# Patient Record
Sex: MALE | Race: WHITE | NOT HISPANIC OR LATINO | URBAN - METROPOLITAN AREA
[De-identification: names, ages, dates, MRNs, and addresses within clinical notes are randomized per-mention and may not be internally consistent; named-entity substitution may affect disease eponyms.]

---

## 2020-03-08 ENCOUNTER — EMERGENCY (EMERGENCY)
Facility: HOSPITAL | Age: 60
LOS: 1 days | Discharge: HOME | End: 2020-03-08
Attending: STUDENT IN AN ORGANIZED HEALTH CARE EDUCATION/TRAINING PROGRAM | Admitting: STUDENT IN AN ORGANIZED HEALTH CARE EDUCATION/TRAINING PROGRAM
Payer: COMMERCIAL

## 2020-03-08 VITALS
HEART RATE: 101 BPM | OXYGEN SATURATION: 95 % | RESPIRATION RATE: 16 BRPM | SYSTOLIC BLOOD PRESSURE: 117 MMHG | DIASTOLIC BLOOD PRESSURE: 72 MMHG | TEMPERATURE: 98 F

## 2020-03-08 PROCEDURE — 12011 RPR F/E/E/N/L/M 2.5 CM/<: CPT

## 2020-03-08 PROCEDURE — 99284 EMERGENCY DEPT VISIT MOD MDM: CPT | Mod: 25

## 2020-03-08 RX ORDER — TETANUS TOXOID, REDUCED DIPHTHERIA TOXOID AND ACELLULAR PERTUSSIS VACCINE, ADSORBED 5; 2.5; 8; 8; 2.5 [IU]/.5ML; [IU]/.5ML; UG/.5ML; UG/.5ML; UG/.5ML
0.5 SUSPENSION INTRAMUSCULAR ONCE
Refills: 0 | Status: COMPLETED | OUTPATIENT
Start: 2020-03-08 | End: 2020-03-08

## 2020-03-08 RX ADMIN — TETANUS TOXOID, REDUCED DIPHTHERIA TOXOID AND ACELLULAR PERTUSSIS VACCINE, ADSORBED 0.5 MILLILITER(S): 5; 2.5; 8; 8; 2.5 SUSPENSION INTRAMUSCULAR at 22:19

## 2020-03-08 NOTE — ED PROVIDER NOTE - NSFOLLOWUPCLINICS_GEN_ALL_ED_FT
Oral & Maxillofacial Surgery  Department of Dental Medicine  [address] [place] [address]  Phone: [phone]  Fax: [phone]  Follow Up Time:

## 2020-03-08 NOTE — ED PROVIDER NOTE - NS ED ROS FT
GENERAL: No fever or chills, EYES: no change in vision, HEENT: no trouble swallowing or speaking, CARDIAC: no chest pain, PULMONARY: no cough or SOB, GI: no abdominal pain, no nausea, no vomiting, no diarrhea or constipation, : No changes in urination, SKIN: no rashes, NEURO: no headache,  MSK: No joint pain ~Arash Lazaro M.D. Resident

## 2020-03-08 NOTE — ED ADULT NURSE NOTE - OBJECTIVE STATEMENT
Pt presents to room 18, aox2-3 with c/o intox. Pt states his last drink was 7-8pm today and "drank a lot of vodka, a whole bottle". Pt states he tripped and fell on face. Upon arrival, pt appears with small laceration on nose, was covered with band-aid,  No active bleeding at this time. Pt is calm and cooperative, no tremors noted, follows verbal commands. Pt denies any headache, dizziness, or N/V/D. Pt denies SI/HI at this time. Pt's belongings taken to room 21, valuables taken to security. Will continue to monitor. Pt presents to room 18, aox2-3 with c/o intox. Pt states his last drink was 7-8pm today and "drank a lot of vodka, a whole bottle". Pt states he tripped and fell on face. Pt states he drinks "every weekend" Upon arrival, pt appears with small laceration on nose, was covered with band-aid,  No active bleeding at this time. Pt is calm and cooperative, no tremors noted, follows verbal commands. Pt denies any headache, dizziness, or N/V/D. Pt denies SI/HI at this time. Pt's belongings taken to room 21, valuables taken to security. Will continue to monitor.

## 2020-03-08 NOTE — ED ADULT NURSE NOTE - NSIMPLEMENTINTERV_GEN_ALL_ED
Implemented All Fall Risk Interventions:  [place] to call system. Call bell, personal items and telephone within reach. Instruct patient to call for assistance. Room bathroom lighting operational. Non-slip footwear when patient is off stretcher. Physically safe environment: no spills, clutter or unnecessary equipment. Stretcher in lowest position, wheels locked, appropriate side rails in place. Provide visual cue, wrist band, yellow gown, etc. Monitor gait and stability. Monitor for mental status changes and reorient to person, place, and time. Review medications for side effects contributing to fall risk. Reinforce activity limits and safety measures with patient and family.

## 2020-03-08 NOTE — ED PROVIDER NOTE - CLINICAL SUMMARY MEDICAL DECISION MAKING FREE TEXT BOX
59yM with no significant pmh presents with facial laceration s/p intox and fall. Will evaluate with CT head/Cspine, Ct maxface, tetanus shot and reassess

## 2020-03-08 NOTE — ED ADULT TRIAGE NOTE - CHIEF COMPLAINT QUOTE
Pt. brought to [facility] ED by WALI for laceration on nose. Pt. tripped and fell in bathroom. Pt. drank a lot of vodka today. Thin vertical laceration noted on bridge of nose. Bleeding controlled. . NAD noted. Denies use of anti-coagulants. Does take asa.

## 2020-03-08 NOTE — ED ADULT NURSE NOTE - CHIEF COMPLAINT QUOTE
Pt. brought to [facility] ED by WALI for laceration on nose. Pt. tripped and fell in bathroom. Pt. drank a lot of vodka today. Thin vertical laceration noted on bridge of nose. Bleeding controlled. . NAD noted. Denies use of anti-coagulants. Does take asa.

## 2020-03-08 NOTE — ED PROVIDER NOTE - NSFOLLOWUPINSTRUCTIONS_ED_ALL_ED_FT
Please follow up with your primary care physician in 24-48 hours  A copy of your results have been provided to you  A referral to Oral and Maxillofacial surgery have been provided to you  Please come back if any of the following: Fever, chest pain, shortness of breath, headache, changes in vision, nausea, pain with eye movement or any major concern Please follow up with your primary care physician in 24-48 hours  Please come back to the Emergency Department or your PCP to remove the stitches in 7 days  A copy of your results have been provided to you  A referral to Oral and Maxillofacial surgery have been provided to you  Please come back if any of the following: Fever, chest pain, shortness of breath, headache, changes in vision, nausea, pain with eye movement or any major concern

## 2020-03-08 NOTE — ED PROVIDER NOTE - PATIENT PORTAL LINK FT
You can access the FollowMyHealth Patient Portal offered by [facility] by registering at the following website: [email or web address]. By joining [facility]’s FollowMyHealth portal, you will also be able to view your health information using other applications (apps) compatible with our system.

## 2020-03-08 NOTE — ED PROVIDER NOTE - OBJECTIVE STATEMENT
59yM with no significant pmh presents with facial laceration s/p intox and fall. States that he drank a bottle of vodka and went to the bathroom in which he tripped and fell face forwards. Admits to drinking a bottle every weekend but have never been in withdrawal. No fever, headache, changes in vision, chest pain, abd pain, n/v, urinary or bowel irregularities

## 2020-03-08 NOTE — ED PROVIDER NOTE - PHYSICAL EXAMINATION
Gen: AAOx3, non-toxic  Head: NCAT  HEENT: EOMI, oral mucosa moist, normal conjunctiva, -1.5cm laceration over nose bridge with mild edema  Lung: CTAB, no respiratory distress, no wheezes/rhonchi/rales B/L, speaking in full sentences  CV: RRR, no murmurs, rubs or gallops  Abd: soft, NTND, no guarding, no CVA tenderness  MSK: no visible deformities, Strength 5/5 UE/LE b/l  Neuro: No focal sensory or motor deficits  Skin: Warm, well perfused, no rash  Psych: normal affect.   ~Arash Lazaro M.D. Resident

## 2020-03-09 VITALS
SYSTOLIC BLOOD PRESSURE: 150 MMHG | RESPIRATION RATE: 16 BRPM | HEART RATE: 109 BPM | OXYGEN SATURATION: 100 % | DIASTOLIC BLOOD PRESSURE: 85 MMHG

## 2020-03-09 PROCEDURE — 70486 CT MAXILLOFACIAL W/O DYE: CPT | Mod: 26

## 2020-03-09 PROCEDURE — 72125 CT NECK SPINE W/O DYE: CPT | Mod: 26

## 2020-03-09 PROCEDURE — 70450 CT HEAD/BRAIN W/O DYE: CPT | Mod: 26

## 2020-03-09 NOTE — ED ADULT NURSE REASSESSMENT NOTE - NS ED NURSE REASSESS COMMENT FT1
Pt is aox4, appears to be comfortable, no tremors or sweated noted. Pt demonstrates steady gait, pt denies any symptoms at this time. Awaiting discharge. Will continue to monitor.

## 2024-11-02 ENCOUNTER — INPATIENT (INPATIENT)
Facility: HOSPITAL | Age: 64
LOS: 0 days | Discharge: LEFT BEFORE DISCHARGE | DRG: 445 | End: 2024-11-02
Attending: INTERNAL MEDICINE | Admitting: INTERNAL MEDICINE
Payer: COMMERCIAL

## 2024-11-02 VITALS
TEMPERATURE: 98 F | DIASTOLIC BLOOD PRESSURE: 76 MMHG | SYSTOLIC BLOOD PRESSURE: 157 MMHG | HEART RATE: 108 BPM | RESPIRATION RATE: 18 BRPM | OXYGEN SATURATION: 98 %

## 2024-11-02 VITALS
OXYGEN SATURATION: 98 % | TEMPERATURE: 99 F | WEIGHT: 220.02 LBS | RESPIRATION RATE: 19 BRPM | SYSTOLIC BLOOD PRESSURE: 152 MMHG | HEART RATE: 115 BPM | DIASTOLIC BLOOD PRESSURE: 85 MMHG

## 2024-11-02 DIAGNOSIS — R17 UNSPECIFIED JAUNDICE: ICD-10-CM

## 2024-11-02 LAB
ALBUMIN SERPL ELPH-MCNC: 2.3 G/DL — LOW (ref 3.5–5)
ALBUMIN SERPL ELPH-MCNC: 2.3 G/DL — LOW (ref 3.5–5)
ALP SERPL-CCNC: 216 U/L — HIGH (ref 40–120)
ALT FLD-CCNC: 40 U/L DA — SIGNIFICANT CHANGE UP (ref 10–60)
AMMONIA BLD-MCNC: 39 UMOL/L — HIGH (ref 11–32)
ANION GAP SERPL CALC-SCNC: 8 MMOL/L — SIGNIFICANT CHANGE UP (ref 5–17)
APPEARANCE UR: CLEAR — SIGNIFICANT CHANGE UP
APTT BLD: 33.6 SEC — SIGNIFICANT CHANGE UP (ref 24.5–35.6)
AST SERPL-CCNC: 175 U/L — HIGH (ref 10–40)
BACTERIA # UR AUTO: ABNORMAL /HPF
BASE EXCESS BLDV CALC-SCNC: 8.8 MMOL/L — SIGNIFICANT CHANGE UP
BASOPHILS # BLD AUTO: 0.03 K/UL — SIGNIFICANT CHANGE UP (ref 0–0.2)
BASOPHILS NFR BLD AUTO: 0.3 % — SIGNIFICANT CHANGE UP (ref 0–2)
BILIRUB DIRECT SERPL-MCNC: 12.4 MG/DL — HIGH (ref 0–0.3)
BILIRUB INDIRECT FLD-MCNC: 3.5 MG/DL — HIGH (ref 0.2–1)
BILIRUB SERPL-MCNC: 15.9 MG/DL — HIGH (ref 0.2–1.2)
BILIRUB SERPL-MCNC: 15.9 MG/DL — HIGH (ref 0.2–1.2)
BILIRUB UR-MCNC: ABNORMAL
BUN SERPL-MCNC: 19 MG/DL — HIGH (ref 7–18)
CA-I SERPL-SCNC: SIGNIFICANT CHANGE UP MMOL/L (ref 1.15–1.33)
CALCIUM SERPL-MCNC: 8.7 MG/DL — SIGNIFICANT CHANGE UP (ref 8.4–10.5)
CHLORIDE SERPL-SCNC: 90 MMOL/L — LOW (ref 96–108)
CO2 SERPL-SCNC: 28 MMOL/L — SIGNIFICANT CHANGE UP (ref 22–31)
COLOR SPEC: SIGNIFICANT CHANGE UP
COMMENT - URINE: SIGNIFICANT CHANGE UP
CREAT SERPL-MCNC: 1.7 MG/DL — HIGH (ref 0.5–1.3)
DIFF PNL FLD: NEGATIVE — SIGNIFICANT CHANGE UP
EGFR: 45 ML/MIN/1.73M2 — LOW
EGFR: 45 ML/MIN/1.73M2 — LOW
EOSINOPHIL # BLD AUTO: 0.05 K/UL — SIGNIFICANT CHANGE UP (ref 0–0.5)
EOSINOPHIL NFR BLD AUTO: 0.5 % — SIGNIFICANT CHANGE UP (ref 0–6)
EPI CELLS # UR: PRESENT
ETHANOL SERPL-MCNC: <3 MG/DL — SIGNIFICANT CHANGE UP (ref 0–10)
GAS PNL BLDV: 117 MMOL/L — CRITICAL LOW (ref 136–145)
GAS PNL BLDV: SIGNIFICANT CHANGE UP
GLUCOSE SERPL-MCNC: 166 MG/DL — HIGH (ref 70–99)
GLUCOSE UR QL: NEGATIVE MG/DL — SIGNIFICANT CHANGE UP
HCO3 BLDV-SCNC: 33 MMOL/L — HIGH (ref 22–29)
HCT VFR BLD CALC: 27.7 % — LOW (ref 39–50)
HGB BLD-MCNC: 10.1 G/DL — LOW (ref 13–17)
IMM GRANULOCYTES NFR BLD AUTO: 0.4 % — SIGNIFICANT CHANGE UP (ref 0–0.9)
INR BLD: 1.68 RATIO — HIGH (ref 0.85–1.16)
KETONES UR-MCNC: NEGATIVE MG/DL — SIGNIFICANT CHANGE UP
LACTATE BLDV-MCNC: 2.1 MMOL/L — HIGH (ref 0.5–2)
LACTATE SERPL-SCNC: 1.7 MMOL/L — SIGNIFICANT CHANGE UP (ref 0.7–2)
LDH SERPL L TO P-CCNC: 240 U/L — HIGH (ref 120–225)
LEUKOCYTE ESTERASE UR-ACNC: ABNORMAL
LIDOCAIN IGE QN: 151 U/L — HIGH (ref 13–75)
LYMPHOCYTES # BLD AUTO: 1.01 K/UL — SIGNIFICANT CHANGE UP (ref 1–3.3)
LYMPHOCYTES # BLD AUTO: 9.6 % — LOW (ref 13–44)
MAGNESIUM SERPL-MCNC: 1.8 MG/DL — SIGNIFICANT CHANGE UP (ref 1.6–2.6)
MCHC RBC-ENTMCNC: 33.9 PG — SIGNIFICANT CHANGE UP (ref 27–34)
MCHC RBC-ENTMCNC: 36.5 G/DL — HIGH (ref 32–36)
MCV RBC AUTO: 93 FL — SIGNIFICANT CHANGE UP (ref 80–100)
MONOCYTES # BLD AUTO: 0.69 K/UL — SIGNIFICANT CHANGE UP (ref 0–0.9)
MONOCYTES NFR BLD AUTO: 6.5 % — SIGNIFICANT CHANGE UP (ref 2–14)
NEUTROPHILS # BLD AUTO: 8.72 K/UL — HIGH (ref 1.8–7.4)
NEUTROPHILS NFR BLD AUTO: 82.7 % — HIGH (ref 43–77)
NITRITE UR-MCNC: POSITIVE
NRBC # BLD: 0 /100 WBCS — SIGNIFICANT CHANGE UP (ref 0–0)
NRBC BLD-RTO: 0 /100 WBCS — SIGNIFICANT CHANGE UP (ref 0–0)
PCO2 BLDV: 41 MMHG — LOW (ref 42–55)
PH BLDV: 7.51 — HIGH (ref 7.32–7.43)
PH UR: 7.5 — SIGNIFICANT CHANGE UP (ref 5–8)
PHOSPHATE SERPL-MCNC: 1.8 MG/DL — LOW (ref 2.5–4.5)
PLATELET # BLD AUTO: 215 K/UL — SIGNIFICANT CHANGE UP (ref 150–400)
PO2 BLDV: 27 MMHG — SIGNIFICANT CHANGE UP
POTASSIUM BLDV-SCNC: 4.7 MMOL/L — SIGNIFICANT CHANGE UP (ref 3.5–5.1)
POTASSIUM SERPL-MCNC: 4 MMOL/L — SIGNIFICANT CHANGE UP (ref 3.5–5.3)
POTASSIUM SERPL-SCNC: 4 MMOL/L — SIGNIFICANT CHANGE UP (ref 3.5–5.3)
POTASSIUM UR-SCNC: 51 MMOL/L — SIGNIFICANT CHANGE UP
PROT SERPL-MCNC: 7.7 G/DL — SIGNIFICANT CHANGE UP (ref 6–8.3)
PROT UR-MCNC: ABNORMAL MG/DL
PROTHROM AB SERPL-ACNC: 19.5 SEC — HIGH (ref 9.9–13.4)
RBC # BLD: 2.98 M/UL — LOW (ref 4.2–5.8)
RBC # FLD: 16 % — HIGH (ref 10.3–14.5)
RBC CASTS # UR COMP ASSIST: 2 /HPF — SIGNIFICANT CHANGE UP (ref 0–4)
SAO2 % BLDV: 44.1 % — SIGNIFICANT CHANGE UP
SODIUM SERPL-SCNC: 126 MMOL/L — LOW (ref 135–145)
SODIUM UR-SCNC: 37 MMOL/L — SIGNIFICANT CHANGE UP
SP GR SPEC: 1.01 — SIGNIFICANT CHANGE UP (ref 1–1.03)
UROBILINOGEN FLD QL: 1 MG/DL — SIGNIFICANT CHANGE UP (ref 0.2–1)
WBC # BLD: 10.54 K/UL — HIGH (ref 3.8–10.5)
WBC # FLD AUTO: 10.54 K/UL — HIGH (ref 3.8–10.5)
WBC UR QL: 5 /HPF — SIGNIFICANT CHANGE UP (ref 0–5)

## 2024-11-02 PROCEDURE — 83605 ASSAY OF LACTIC ACID: CPT

## 2024-11-02 PROCEDURE — 81001 URINALYSIS AUTO W/SCOPE: CPT

## 2024-11-02 PROCEDURE — 82803 BLOOD GASES ANY COMBINATION: CPT

## 2024-11-02 PROCEDURE — 76705 ECHO EXAM OF ABDOMEN: CPT | Mod: 26

## 2024-11-02 PROCEDURE — 99223 1ST HOSP IP/OBS HIGH 75: CPT

## 2024-11-02 PROCEDURE — 76705 ECHO EXAM OF ABDOMEN: CPT

## 2024-11-02 PROCEDURE — 82247 BILIRUBIN TOTAL: CPT

## 2024-11-02 PROCEDURE — 74177 CT ABD & PELVIS W/CONTRAST: CPT | Mod: MC

## 2024-11-02 PROCEDURE — 84300 ASSAY OF URINE SODIUM: CPT

## 2024-11-02 PROCEDURE — 84133 ASSAY OF URINE POTASSIUM: CPT

## 2024-11-02 PROCEDURE — 99285 EMERGENCY DEPT VISIT HI MDM: CPT

## 2024-11-02 PROCEDURE — 84295 ASSAY OF SERUM SODIUM: CPT

## 2024-11-02 PROCEDURE — 74177 CT ABD & PELVIS W/CONTRAST: CPT | Mod: 26,MC

## 2024-11-02 PROCEDURE — 84100 ASSAY OF PHOSPHORUS: CPT

## 2024-11-02 PROCEDURE — 96375 TX/PRO/DX INJ NEW DRUG ADDON: CPT

## 2024-11-02 PROCEDURE — 82248 BILIRUBIN DIRECT: CPT

## 2024-11-02 PROCEDURE — 83010 ASSAY OF HAPTOGLOBIN QUANT: CPT

## 2024-11-02 PROCEDURE — 82977 ASSAY OF GGT: CPT

## 2024-11-02 PROCEDURE — 82330 ASSAY OF CALCIUM: CPT

## 2024-11-02 PROCEDURE — 85025 COMPLETE CBC W/AUTO DIFF WBC: CPT

## 2024-11-02 PROCEDURE — 83615 LACTATE (LD) (LDH) ENZYME: CPT

## 2024-11-02 PROCEDURE — 96365 THER/PROPH/DIAG IV INF INIT: CPT

## 2024-11-02 PROCEDURE — 83690 ASSAY OF LIPASE: CPT

## 2024-11-02 PROCEDURE — 80053 COMPREHEN METABOLIC PANEL: CPT

## 2024-11-02 PROCEDURE — 82140 ASSAY OF AMMONIA: CPT

## 2024-11-02 PROCEDURE — 93005 ELECTROCARDIOGRAM TRACING: CPT

## 2024-11-02 PROCEDURE — 85730 THROMBOPLASTIN TIME PARTIAL: CPT

## 2024-11-02 PROCEDURE — 87040 BLOOD CULTURE FOR BACTERIA: CPT

## 2024-11-02 PROCEDURE — 36415 COLL VENOUS BLD VENIPUNCTURE: CPT

## 2024-11-02 PROCEDURE — 80074 ACUTE HEPATITIS PANEL: CPT

## 2024-11-02 PROCEDURE — 83735 ASSAY OF MAGNESIUM: CPT

## 2024-11-02 PROCEDURE — 84132 ASSAY OF SERUM POTASSIUM: CPT

## 2024-11-02 PROCEDURE — 82040 ASSAY OF SERUM ALBUMIN: CPT

## 2024-11-02 PROCEDURE — 85610 PROTHROMBIN TIME: CPT

## 2024-11-02 PROCEDURE — 80307 DRUG TEST PRSMV CHEM ANLYZR: CPT

## 2024-11-02 RX ORDER — INSULIN LISPRO 100 U/ML
INJECTION, SOLUTION INTRAVENOUS; SUBCUTANEOUS AT BEDTIME
Refills: 0 | Status: DISCONTINUED | OUTPATIENT
Start: 2024-11-02 | End: 2024-11-02

## 2024-11-02 RX ORDER — INSULIN LISPRO 100 U/ML
INJECTION, SOLUTION INTRAVENOUS; SUBCUTANEOUS
Refills: 0 | Status: DISCONTINUED | OUTPATIENT
Start: 2024-11-02 | End: 2024-11-02

## 2024-11-02 RX ORDER — ACETAMINOPHEN 500 MG/5ML
650 LIQUID (ML) ORAL EVERY 6 HOURS
Refills: 0 | Status: DISCONTINUED | OUTPATIENT
Start: 2024-11-02 | End: 2024-11-02

## 2024-11-02 RX ORDER — MAGNESIUM, ALUMINUM HYDROXIDE 200-200 MG
30 TABLET,CHEWABLE ORAL EVERY 4 HOURS
Refills: 0 | Status: DISCONTINUED | OUTPATIENT
Start: 2024-11-02 | End: 2024-11-02

## 2024-11-02 RX ORDER — PIPERACILLIN-TAZO-DEXTROSE,ISO 3.375G/5
3.38 IV SOLUTION, PIGGYBACK PREMIX FROZEN(ML) INTRAVENOUS ONCE
Refills: 0 | Status: COMPLETED | OUTPATIENT
Start: 2024-11-02 | End: 2024-11-02

## 2024-11-02 RX ORDER — ONDANSETRON HCL/PF 4 MG/2 ML
4 VIAL (ML) INJECTION EVERY 8 HOURS
Refills: 0 | Status: DISCONTINUED | OUTPATIENT
Start: 2024-11-02 | End: 2024-11-02

## 2024-11-02 RX ORDER — POTASSIUM PHOSPHATE, MONOBASIC POTASSIUM PHOSPHATE, DIBASIC INJECTION, 236; 224 MG/ML; MG/ML
30 SOLUTION, CONCENTRATE INTRAVENOUS ONCE
Refills: 0 | Status: COMPLETED | OUTPATIENT
Start: 2024-11-02 | End: 2024-11-02

## 2024-11-02 RX ORDER — MELATONIN 5 MG
3 TABLET ORAL AT BEDTIME
Refills: 0 | Status: DISCONTINUED | OUTPATIENT
Start: 2024-11-02 | End: 2024-11-02

## 2024-11-02 RX ORDER — SODIUM CHLORIDE 9 G/1000ML
1000 INJECTION, SOLUTION INTRAVENOUS ONCE
Refills: 0 | Status: COMPLETED | OUTPATIENT
Start: 2024-11-02 | End: 2024-11-02

## 2024-11-02 RX ADMIN — SODIUM CHLORIDE 1000 MILLILITER(S): 9 INJECTION, SOLUTION INTRAVENOUS at 12:46

## 2024-11-02 RX ADMIN — POTASSIUM PHOSPHATE, MONOBASIC POTASSIUM PHOSPHATE, DIBASIC INJECTION, 83.33 MILLIMOLE(S): 236; 224 SOLUTION, CONCENTRATE INTRAVENOUS at 15:11

## 2024-11-02 RX ADMIN — Medication 1000 MILLILITER(S): at 15:41

## 2024-11-02 RX ADMIN — Medication 200 GRAM(S): at 14:41

## 2024-11-02 RX ADMIN — Medication 1000 MILLILITER(S): at 14:41

## 2024-11-02 RX ADMIN — SODIUM CHLORIDE 1000 MILLILITER(S): 9 INJECTION, SOLUTION INTRAVENOUS at 13:46

## 2024-11-02 RX ADMIN — Medication 3.38 GRAM(S): at 15:11

## 2024-11-03 DIAGNOSIS — E78.5 HYPERLIPIDEMIA, UNSPECIFIED: ICD-10-CM

## 2024-11-03 DIAGNOSIS — E11.9 TYPE 2 DIABETES MELLITUS WITHOUT COMPLICATIONS: ICD-10-CM

## 2024-11-03 DIAGNOSIS — K74.60 UNSPECIFIED CIRRHOSIS OF LIVER: ICD-10-CM

## 2024-11-03 DIAGNOSIS — E87.1 HYPO-OSMOLALITY AND HYPONATREMIA: ICD-10-CM

## 2024-11-03 DIAGNOSIS — Z29.9 ENCOUNTER FOR PROPHYLACTIC MEASURES, UNSPECIFIED: ICD-10-CM

## 2024-11-03 DIAGNOSIS — I10 ESSENTIAL (PRIMARY) HYPERTENSION: ICD-10-CM

## 2024-11-03 DIAGNOSIS — R17 UNSPECIFIED JAUNDICE: ICD-10-CM

## 2024-11-03 LAB
GGT SERPL-CCNC: 757 U/L — HIGH (ref 9–50)
HAPTOGLOB SERPL-MCNC: 106 MG/DL — SIGNIFICANT CHANGE UP (ref 34–200)
HAV IGM SER-ACNC: SIGNIFICANT CHANGE UP
HBV CORE IGM SER-ACNC: SIGNIFICANT CHANGE UP
HBV SURFACE AG SER-ACNC: SIGNIFICANT CHANGE UP
HCV AB S/CO SERPL IA: 0.11 S/CO — SIGNIFICANT CHANGE UP (ref 0–0.99)
HCV AB SERPL-IMP: SIGNIFICANT CHANGE UP

## 2024-11-04 ENCOUNTER — INPATIENT (INPATIENT)
Facility: HOSPITAL | Age: 64
LOS: 13 days | Discharge: HOME | DRG: 433 | End: 2024-11-18
Attending: STUDENT IN AN ORGANIZED HEALTH CARE EDUCATION/TRAINING PROGRAM | Admitting: STUDENT IN AN ORGANIZED HEALTH CARE EDUCATION/TRAINING PROGRAM
Payer: COMMERCIAL

## 2024-11-04 VITALS
OXYGEN SATURATION: 97 % | HEART RATE: 115 BPM | RESPIRATION RATE: 18 BRPM | DIASTOLIC BLOOD PRESSURE: 69 MMHG | TEMPERATURE: 99 F | SYSTOLIC BLOOD PRESSURE: 131 MMHG

## 2024-11-04 DIAGNOSIS — Z29.9 ENCOUNTER FOR PROPHYLACTIC MEASURES, UNSPECIFIED: ICD-10-CM

## 2024-11-04 DIAGNOSIS — R17 UNSPECIFIED JAUNDICE: ICD-10-CM

## 2024-11-04 DIAGNOSIS — E87.1 HYPO-OSMOLALITY AND HYPONATREMIA: ICD-10-CM

## 2024-11-04 DIAGNOSIS — F10.90 ALCOHOL USE, UNSPECIFIED, UNCOMPLICATED: ICD-10-CM

## 2024-11-04 DIAGNOSIS — I10 ESSENTIAL (PRIMARY) HYPERTENSION: ICD-10-CM

## 2024-11-04 DIAGNOSIS — K74.60 UNSPECIFIED CIRRHOSIS OF LIVER: ICD-10-CM

## 2024-11-04 DIAGNOSIS — E78.5 HYPERLIPIDEMIA, UNSPECIFIED: ICD-10-CM

## 2024-11-04 DIAGNOSIS — N17.9 ACUTE KIDNEY FAILURE, UNSPECIFIED: ICD-10-CM

## 2024-11-04 DIAGNOSIS — K80.20 CALCULUS OF GALLBLADDER WITHOUT CHOLECYSTITIS WITHOUT OBSTRUCTION: ICD-10-CM

## 2024-11-04 DIAGNOSIS — E11.9 TYPE 2 DIABETES MELLITUS WITHOUT COMPLICATIONS: ICD-10-CM

## 2024-11-04 LAB
ALBUMIN SERPL ELPH-MCNC: 2.3 G/DL — LOW (ref 3.5–5)
ALP SERPL-CCNC: 203 U/L — HIGH (ref 40–120)
ALT FLD-CCNC: 45 U/L DA — SIGNIFICANT CHANGE UP (ref 10–60)
ANION GAP SERPL CALC-SCNC: 9 MMOL/L — SIGNIFICANT CHANGE UP (ref 5–17)
APPEARANCE UR: ABNORMAL
APPEARANCE UR: ABNORMAL
AST SERPL-CCNC: 136 U/L — HIGH (ref 10–40)
BACTERIA # UR AUTO: ABNORMAL /HPF
BACTERIA # UR AUTO: ABNORMAL /HPF
BASOPHILS # BLD AUTO: 0.05 K/UL — SIGNIFICANT CHANGE UP (ref 0–0.2)
BASOPHILS NFR BLD AUTO: 0.4 % — SIGNIFICANT CHANGE UP (ref 0–2)
BILIRUB DIRECT SERPL-MCNC: 14.1 MG/DL — HIGH (ref 0–0.3)
BILIRUB SERPL-MCNC: 18.7 MG/DL — HIGH (ref 0.2–1.2)
BILIRUB UR-MCNC: ABNORMAL
BILIRUB UR-MCNC: ABNORMAL
BUN SERPL-MCNC: 17 MG/DL — SIGNIFICANT CHANGE UP (ref 7–18)
CALCIUM SERPL-MCNC: 9 MG/DL — SIGNIFICANT CHANGE UP (ref 8.4–10.5)
CHLORIDE SERPL-SCNC: 97 MMOL/L — SIGNIFICANT CHANGE UP (ref 96–108)
CO2 SERPL-SCNC: 23 MMOL/L — SIGNIFICANT CHANGE UP (ref 22–31)
COLOR SPEC: SIGNIFICANT CHANGE UP
COLOR SPEC: SIGNIFICANT CHANGE UP
CREAT ?TM UR-MCNC: 220 MG/DL — SIGNIFICANT CHANGE UP
CREAT SERPL-MCNC: 1.39 MG/DL — HIGH (ref 0.5–1.3)
DIFF PNL FLD: NEGATIVE — SIGNIFICANT CHANGE UP
DIFF PNL FLD: NEGATIVE — SIGNIFICANT CHANGE UP
EGFR: 57 ML/MIN/1.73M2 — LOW
EOSINOPHIL # BLD AUTO: 0.12 K/UL — SIGNIFICANT CHANGE UP (ref 0–0.5)
EOSINOPHIL NFR BLD AUTO: 1 % — SIGNIFICANT CHANGE UP (ref 0–6)
EPI CELLS # UR: PRESENT
EPI CELLS # UR: PRESENT
GLUCOSE BLDC GLUCOMTR-MCNC: 128 MG/DL — HIGH (ref 70–99)
GLUCOSE SERPL-MCNC: 197 MG/DL — HIGH (ref 70–99)
GLUCOSE UR QL: NEGATIVE MG/DL — SIGNIFICANT CHANGE UP
GLUCOSE UR QL: NEGATIVE MG/DL — SIGNIFICANT CHANGE UP
HCT VFR BLD CALC: 28.4 % — LOW (ref 39–50)
HGB BLD-MCNC: 9.8 G/DL — LOW (ref 13–17)
IMM GRANULOCYTES NFR BLD AUTO: 0.7 % — SIGNIFICANT CHANGE UP (ref 0–0.9)
KETONES UR-MCNC: ABNORMAL MG/DL
KETONES UR-MCNC: NEGATIVE MG/DL — SIGNIFICANT CHANGE UP
LEUKOCYTE ESTERASE UR-ACNC: ABNORMAL
LEUKOCYTE ESTERASE UR-ACNC: ABNORMAL
LIDOCAIN IGE QN: 203 U/L — HIGH (ref 13–75)
LYMPHOCYTES # BLD AUTO: 1.22 K/UL — SIGNIFICANT CHANGE UP (ref 1–3.3)
LYMPHOCYTES # BLD AUTO: 10.2 % — LOW (ref 13–44)
MCHC RBC-ENTMCNC: 32.8 PG — SIGNIFICANT CHANGE UP (ref 27–34)
MCHC RBC-ENTMCNC: 34.5 G/DL — SIGNIFICANT CHANGE UP (ref 32–36)
MCV RBC AUTO: 95 FL — SIGNIFICANT CHANGE UP (ref 80–100)
MONOCYTES # BLD AUTO: 0.85 K/UL — SIGNIFICANT CHANGE UP (ref 0–0.9)
MONOCYTES NFR BLD AUTO: 7.1 % — SIGNIFICANT CHANGE UP (ref 2–14)
NEUTROPHILS # BLD AUTO: 9.65 K/UL — HIGH (ref 1.8–7.4)
NEUTROPHILS NFR BLD AUTO: 80.6 % — HIGH (ref 43–77)
NITRITE UR-MCNC: POSITIVE
NITRITE UR-MCNC: POSITIVE
NRBC # BLD: 0 /100 WBCS — SIGNIFICANT CHANGE UP (ref 0–0)
PH UR: 6 — SIGNIFICANT CHANGE UP (ref 5–8)
PH UR: 6 — SIGNIFICANT CHANGE UP (ref 5–8)
PLATELET # BLD AUTO: 217 K/UL — SIGNIFICANT CHANGE UP (ref 150–400)
POTASSIUM SERPL-MCNC: 3.7 MMOL/L — SIGNIFICANT CHANGE UP (ref 3.5–5.3)
POTASSIUM SERPL-SCNC: 3.7 MMOL/L — SIGNIFICANT CHANGE UP (ref 3.5–5.3)
POTASSIUM UR-SCNC: 52 MMOL/L — SIGNIFICANT CHANGE UP
PROT ?TM UR-MCNC: 52 MG/DL — HIGH (ref 0–12)
PROT SERPL-MCNC: 8.1 G/DL — SIGNIFICANT CHANGE UP (ref 6–8.3)
PROT UR-MCNC: 30 MG/DL
PROT UR-MCNC: ABNORMAL MG/DL
PROT/CREAT UR-RTO: 0.2 RATIO — SIGNIFICANT CHANGE UP (ref 0–0.2)
RBC # BLD: 2.99 M/UL — LOW (ref 4.2–5.8)
RBC # FLD: 17.1 % — HIGH (ref 10.3–14.5)
RBC CASTS # UR COMP ASSIST: 1 /HPF — SIGNIFICANT CHANGE UP (ref 0–4)
RBC CASTS # UR COMP ASSIST: 1 /HPF — SIGNIFICANT CHANGE UP (ref 0–4)
SODIUM SERPL-SCNC: 129 MMOL/L — LOW (ref 135–145)
SODIUM UR-SCNC: 40 MMOL/L — SIGNIFICANT CHANGE UP
SP GR SPEC: 1.02 — SIGNIFICANT CHANGE UP (ref 1–1.03)
SP GR SPEC: 1.02 — SIGNIFICANT CHANGE UP (ref 1–1.03)
TROPONIN I, HIGH SENSITIVITY RESULT: 41.6 NG/L — SIGNIFICANT CHANGE UP
UROBILINOGEN FLD QL: 1 MG/DL — SIGNIFICANT CHANGE UP (ref 0.2–1)
UROBILINOGEN FLD QL: 1 MG/DL — SIGNIFICANT CHANGE UP (ref 0.2–1)
WBC # BLD: 11.97 K/UL — HIGH (ref 3.8–10.5)
WBC # FLD AUTO: 11.97 K/UL — HIGH (ref 3.8–10.5)
WBC UR QL: 12 /HPF — HIGH (ref 0–5)
WBC UR QL: 13 /HPF — HIGH (ref 0–5)

## 2024-11-04 PROCEDURE — 76705 ECHO EXAM OF ABDOMEN: CPT | Mod: 26

## 2024-11-04 PROCEDURE — 93010 ELECTROCARDIOGRAM REPORT: CPT

## 2024-11-04 PROCEDURE — 99233 SBSQ HOSP IP/OBS HIGH 50: CPT | Mod: GC

## 2024-11-04 PROCEDURE — 99223 1ST HOSP IP/OBS HIGH 75: CPT | Mod: GC

## 2024-11-04 PROCEDURE — 71045 X-RAY EXAM CHEST 1 VIEW: CPT | Mod: 26

## 2024-11-04 PROCEDURE — 99285 EMERGENCY DEPT VISIT HI MDM: CPT

## 2024-11-04 RX ORDER — AMLODIPINE BESYLATE 10 MG/1
10 TABLET ORAL DAILY
Refills: 0 | Status: DISCONTINUED | OUTPATIENT
Start: 2024-11-04 | End: 2024-11-18

## 2024-11-04 RX ORDER — ENOXAPARIN SODIUM 30 MG/.3ML
40 INJECTION SUBCUTANEOUS EVERY 24 HOURS
Refills: 0 | Status: DISCONTINUED | OUTPATIENT
Start: 2024-11-04 | End: 2024-11-18

## 2024-11-04 RX ORDER — SODIUM CHLORIDE 9 MG/ML
250 INJECTION, SOLUTION INTRAMUSCULAR; INTRAVENOUS; SUBCUTANEOUS ONCE
Refills: 0 | Status: COMPLETED | OUTPATIENT
Start: 2024-11-04 | End: 2024-11-04

## 2024-11-04 RX ORDER — AMLODIPINE BESYLATE 10 MG/1
10 TABLET ORAL DAILY
Refills: 0 | Status: DISCONTINUED | OUTPATIENT
Start: 2024-11-04 | End: 2024-11-04

## 2024-11-04 RX ORDER — INFLUENZA VIRUS VACCINE 15; 15; 15; 15 UG/.5ML; UG/.5ML; UG/.5ML; UG/.5ML
0.5 SUSPENSION INTRAMUSCULAR ONCE
Refills: 0 | Status: DISCONTINUED | OUTPATIENT
Start: 2024-11-04 | End: 2024-11-18

## 2024-11-04 RX ADMIN — SODIUM CHLORIDE 250 MILLILITER(S): 9 INJECTION, SOLUTION INTRAMUSCULAR; INTRAVENOUS; SUBCUTANEOUS at 19:14

## 2024-11-04 RX ADMIN — ENOXAPARIN SODIUM 40 MILLIGRAM(S): 30 INJECTION SUBCUTANEOUS at 19:14

## 2024-11-04 NOTE — H&P ADULT - PROBLEM SELECTOR PLAN 4
Stable    on amlodipien and valsartan at home  c/w home meds - No signs of acute cholecystitis  - As above Drinks 1 pint vodka since 5/2024  Last drink on 10/29 Drinks 1 pint vodka since 5/2024  Last drink on 10/29  Social work consulted

## 2024-11-04 NOTE — H&P ADULT - PROBLEM SELECTOR PLAN 3
Likely secondary to cirrhosis  Na 129 on admission  -f/u urine studies  -monitor BMP No signs of bleeding currently  Plan as above No signs of bleeding currently  Plan as above  - Pt will need screening EGD

## 2024-11-04 NOTE — ED ADULT NURSE REASSESSMENT NOTE - NS ED NURSE REASSESS COMMENT FT1
Hand off :  received in stable condition NAD. Patient is A&OX4 speaking in full comprehensible sentences unlabored breathing. Pt denies any discomfort at this moment. Patent 20G IV access Lt hand  present, dressing is clean and dry. Continuum of care on going

## 2024-11-04 NOTE — H&P ADULT - ASSESSMENT
64 Y M H/O HTN, HLD, DM, alcohol use disorder, presents with new onset painless jaundice. CT and US showing new cirrhosis, varices, gallstones, and could not r/o cholecystitis. Admitted for further evaluation.

## 2024-11-04 NOTE — ED PROVIDER NOTE - CLINICAL SUMMARY MEDICAL DECISION MAKING FREE TEXT BOX
Patient left AMA for new jaundice due to possible Hepatocellular Carcinoma.    Patient returned with no new symptoms, persistent jaundice requesting for completion of his medical evaluation.    Patient to be admitted to medical service.

## 2024-11-04 NOTE — H&P ADULT - NSHPPHYSICALEXAM_GEN_ALL_CORE
GENERAL: NAD, well-groomed, well-developed  HEAD:  Atraumatic, Normocephalic  EYES: Scleral icterus. EOMI, PERRLA.  ENMT: No tonsillar erythema, exudates, or enlargement; Moist mucous membranes, Good dentition, No lesions  NECK: Supple, normal appearance, No JVD; Normal thyroid; Trachea midline  NERVOUS SYSTEM:  Alert & Oriented X3,  Motor Strength 5/5 B/L upper and lower extremities, sensation intact  CHEST/LUNG: Lungs clear to auscultation bilaterally, No rales, rhonchi, wheezing   HEART: Regular rate and rhythm; No murmurs, rubs, or gallops  ABDOMEN: Soft, Nontender, Bowel sounds present  EXTREMITIES:  1+ pitting edema BLE. 2+ Peripheral Pulses, No clubbing, cyanosis  LYMPH: No lymphadenopathy noted  SKIN: Jaundice over face, eyes, abdomen. No rashes or lesions;  Good capillary refill GENERAL: NAD, well-groomed, well-developed  HEAD:  Atraumatic, Normocephalic  EYES: Scleral icterus. EOMI, PERRLA.  ENMT: No tonsillar erythema, exudates, or enlargement; Moist mucous membranes, Good dentition, No lesions  NECK: Supple, normal appearance, No JVD; Normal thyroid; Trachea midline  NERVOUS SYSTEM:  Alert & Oriented X3,  Motor Strength 5/5 B/L upper and lower extremities, sensation intact  CHEST/LUNG: Lungs clear to auscultation bilaterally, No rales, rhonchi, wheezing   HEART: Regular rate and rhythm; No murmurs, rubs, or gallops  ABDOMEN: Mild ascites. Soft, Nontender, Bowel sounds present  EXTREMITIES:  1+ pitting edema BLE. 2+ Peripheral Pulses, No clubbing, cyanosis  LYMPH: No lymphadenopathy noted  SKIN: Jaundice over face, eyes, abdomen. No rashes or lesions;  Good capillary refill

## 2024-11-04 NOTE — H&P ADULT - HISTORY OF PRESENT ILLNESS
64 Y M H/O HTN, HLD, DM, alcohol use disorder, presents for painless jaundice x 3 days. Also reports 1 episode of vomiting (white, NBNB) one week ago, as well as darker urine for 1 week. Pt has history of heavy drinking for past 6 months, 1 pint vodka per day, last drink on 10/29. Denies abdominal pain, nausea, fever, chills, SOB, wheezing, chest pain, palpitations, body aches, dysuria, or any other complaints. Pt was recently admitted here on 11/2, left hospital AMA, and then returned here after recommendation from a doctor's phone call.    ED labs:  Vitals: , /69, SpO2 97% on RA, 99 F  Labs: WBC 11, Hgb 9.8. PT 19, INR 1.68. Na 129, Cr 1.3. T Bili 18, D Bili 14, , , ALT 45, lipase 203  Recent imaging:  CTAP: cirrhosis, hepatomegaly, portal HTN, varices, gall stones, cholecystic wall edema, pericholecystic fluid  Abd US: hepatomegaly, gall stones, distended gall bladder, negative sonographic Traylor, mild prominent CBD    Pt admitted for painless jaundice cirrhosis, alcohol use disorder. 64 Y M H/O HTN, HLD, DM, alcohol use disorder, presents for painless jaundice x 3 days. Also reports 1 episode of vomiting (white, NBNB) one week ago, as well as darker urine for 1 week. Pt has history of heavy drinking for past 6 months, 1 pint vodka per day, last drink on 10/29. Denies abdominal pain, nausea, fever, chills, SOB, wheezing, chest pain, palpitations, body aches, dysuria, or any other complaints. Pt was recently admitted here on 11/2, left hospital AMA, and then returned here after recommendation from a doctor's phone call.    In ED:  - Vitals: , /69, SpO2 97% on RA, 99 F  - Labs: WBC 11, Hgb 9.8. PT 19, INR 1.68. Na 129, Cr 1.3. T Bili 18, D Bili 14, , , ALT 45, lipase 203  - Recent imaging:  CTAP: cirrhosis, hepatomegaly, portal HTN, varices, gall stones, cholecystic wall edema, pericholecystic fluid  Abd US: hepatomegaly, gall stones, distended gall bladder, negative sonographic Traylor, mild prominent CBD    Pt admitted for painless jaundice cirrhosis, alcohol use disorder. 64 Y M H/O HTN, HLD, DM, alcohol use disorder, presents for painless jaundice x 3 days. Also reports 1 episode of vomiting (white, NBNB) one week ago, as well as darker urine for 1 week. Pt has history of heavy drinking for past 6 months, 1 pint vodka per day, last drink on 10/29. Denies itching, abdominal pain, nausea, fever, chills, SOB, wheezing, chest pain, palpitations, body aches, dysuria, or any other complaints. Pt was recently admitted here on 11/2, left hospital AMA, and then returned here after recommendation from a doctor's phone call.    In ED:  - Vitals: , /69, SpO2 97% on RA, 99 F  - Labs: WBC 11, Hgb 9.8. PT 19, INR 1.68. Na 129, Cr 1.3. T Bili 18, D Bili 14, , , ALT 45, lipase 203  - Recent imaging:  CTAP: cirrhosis, hepatomegaly, portal HTN, varices, gall stones, cholecystic wall edema, pericholecystic fluid  Abd US: hepatomegaly, gall stones, distended gall bladder, negative sonographic Traylor, mild prominent CBD    Pt admitted for painless jaundice cirrhosis, alcohol use disorder.

## 2024-11-04 NOTE — H&P ADULT - PROBLEM SELECTOR PLAN 9
Home meds: amlodipine 10mg QD, valsartan 320 mg QD Previously took lipitor  stopped 6m ago as per his PCP rx.

## 2024-11-04 NOTE — H&P ADULT - PROBLEM SELECTOR PLAN 7
DVT prop: lovenox Previously took lipitor  stopped 6m ago as per his PCP rx. Home med: Mounjaro  Started ISS  F/u A1c Cr 1.39 on admssion  TODD  versus CKD  Follow up urine studies

## 2024-11-04 NOTE — ED PROVIDER NOTE - OBJECTIVE STATEMENT
64-year-old male with new onset jaundice presents to the ED reporting he left AMA while admitted and returned requesting his evaluation be completed.    General: NAD. Obese.  HEENT: EOMI. +Scleral icterus.  PERRLA. NCAT. No JVD.  Heart/Lungs: +Tacyhcardic 100bpm, CTAB.  Chest/Back: No asymmetry. No CVAT.  Abd: Soft, NT,ND.  Neuro: No focal deficits.  Skin: +Diffuse jaundice  Psych: Calm. 64-year-old male PMHX Obesity, ETOH Dependence, with new onset jaundice presents to the ED reporting he left AMA while admitted and returned requesting his evaluation be completed.    General: NAD. Obese.  HEENT: EOMI. +Scleral icterus.  PERRLA. NCAT. No JVD.  Heart/Lungs: +Tacyhcardic 100bpm, CTAB.  Chest/Back: No asymmetry. No CVAT.  Abd: Soft, NT,ND.  Neuro: No focal deficits.  Skin: +Diffuse jaundice  Psych: Calm.

## 2024-11-04 NOTE — PATIENT PROFILE ADULT - TOBACCO USE
DNR/DNI  - hospice plan in place.  - monitor for symptoms of pain/dyspnea etc call x [phone] for palliative care team as needed DNR/DNI  - hospice plan in place.  - monitor for symptoms of pain/dyspnea etc call x [phone] for palliative care team as needed. DNR/DNI  - hospice plan in place Never smoker

## 2024-11-04 NOTE — H&P ADULT - PROBLEM SELECTOR PLAN 6
on Mounjaro  started ISS Home med: Mounjaro  Started ISS  F/u A1c Most likely secondary to cirrhosis  Na 129 on admission  - F/u urine studies  - F/u CMP Na 129 on admission  Pedal edema chronic, secondary to amlodipine  - F/u urine studies  - F/u CMP

## 2024-11-04 NOTE — H&P ADULT - PROBLEM SELECTOR PROBLEM 8
PT DAILY TREATMENT NOTE 10-18    Patient Name: Natalio Mars  Date:2019  : 1952  [x]  Patient  Verified  Payor: BLUE CROSS / Plan: [employer] [facility] / Product Type: PPO /    In time:8:59  Out time:9:35  Total Treatment Time (min): 36  Visit #: 2 of 8    Medicare/BCBS Only   Total Timed Codes (min):  36 1:1 Treatment Time:  36       Treatment Area: Right shoulder pain [M25.511]  S/P shoulder surgery [Z98.890]    SUBJECTIVE  Pain Level (0-10 scale): 2  Any medication changes, allergies to medications, adverse drug reactions, diagnosis change, or new procedure performed?: [x] No    [] Yes (see summary sheet for update)  Subjective functional status/changes:   [] No changes reported  Pt reports having a hard time sleeping because of shoulder    OBJECTIVE    26 min Therapeutic Exercise:  [] See flow sheet :   Rationale: increase ROM and increase strength to improve the patients ability to perform ADLs    10 min Manual Therapy:  scap mobs, PROM to right shoulder, DTM to medial border of scap   Rationale: decrease pain, increase ROM and increase tissue extensibility to improve functional mobility            With   [] TE   [] TA   [] neuro   [] other: Patient Education: [x] Review HEP    [] Progressed/Changed HEP based on:   [] positioning   [] body mechanics   [] transfers   [] heat/ice application    [] other:      Other Objective/Functional Measures:   First f/u after eval     Pain Level (0-10 scale) post treatment: 1    ASSESSMENT/Changes in Function: Initiated treatment program per flow sheet. Reviewed HEP for understanding and compliance. Pt demo's near full PROM flex and abd, limited with ER and IR.  Progressing well per protocol    Patient will continue to benefit from skilled PT services to modify and progress therapeutic interventions, address functional mobility deficits, address ROM deficits, address strength deficits, analyze and address soft tissue restrictions, analyze and cue movement patterns, analyze and modify body mechanics/ergonomics and assess and modify postural abnormalities to attain remaining goals. []  See Plan of Care  []  See progress note/recertification  []  See Discharge Summary         Progress towards goals / Updated goals:  Short Term Goals: To be accomplished in 1 weeks:               1. Patient will be ind and compliant with HEP 1-2x/day to increase ease with ADLs. 2. Patient will improve right shoulder PROM to [facility] to progress to next phase of rehabilitation. Long Term Goals: To be accomplished in 4 weeks:               1. Patient will improve FOTO to at least 64 to demonstrate functional improvement. 2. Patient will improve right shoulder AAROM flex to 120deg, Once cleared by MD to progress to [facility], to increase ease with overhead activities. 3. Patient will improve right shoulder AROM flex and scaption to 115deg, once cleared by MD to progress to AROM, to increase with self care.        PLAN  []  Upgrade activities as tolerated     [x]  Continue plan of care  []  Update interventions per flow sheet       []  Discharge due to:_  []  Other:_      Marcelo Child PTA 4/5/2019  8:50 AM    Future Appointments   Date Time Provider Cooper Dhillon   4/5/2019  9:00 AM [address] PT [facility] [address]   4/8/2019  1:30 PM Delmar Robins, PT [facility] [address]   4/10/2019  9:30 AM Jani Heman, PT [facility] [address]   4/15/2019  2:30 PM Jani Heman, PT [facility] [address]   4/17/2019  8:30 AM Jani Heman, PT [facility] [address]   4/22/2019  2:00 PM Jani Heman, PT [facility] [address]   4/24/2019  8:40 AM Maddy Weiss DO [facility] SCHED   4/24/2019  2:00 PM Jani Heman, PT [facility] [address]   4/29/2019  8:30 AM Jani Heman, PT [facility] [address]   5/1/2019  8:30 AM Jani Heman, PT [facility] [address]   5/13/2019 10:00 AM Carito Streeter MD [place]   5/31/2019  9:00 AM Betty Ferrera MD [facility] HTN (hypertension) HLD (hyperlipidemia) Diabetes mellitus

## 2024-11-04 NOTE — H&P ADULT - PROBLEM SELECTOR PLAN 10
Home meds: amlodipine 10mg QD, valsartan 320 mg QD  Start amlodipine 10 QD  Hold valsartan given TODD

## 2024-11-04 NOTE — H&P ADULT - PROBLEM SELECTOR PLAN 1
p/w painless jaundice for 3 days  TB 18;   RUQ US: No intrahepatic bile duct dilatation visible; the CBD is not visualized due to the limitations of this exam; Cholelithiasis without any convincing evidence for cholecystitis this time  CTAP: Cholelithiasis with mild gallbladder wall edema and surrounding pericholecystic fluid/ascites in the setting of cirrhosis. Cholecystitis cannot be excluded.    -GI consult in am - Painless jaundice x 3 days, with darker urine  - Drinks 1 pint vodka per day, last drink on 10/29  - RUQ US: No intrahepatic bile duct dilatation visible; the CBD is not visualized due to the limitations of this exam; Cholelithiasis without any convincing evidence for cholecystitis this time  - CTAP: Cholelithiasis with mild gallbladder wall edema and surrounding pericholecystic fluid/ascites in the setting of cirrhosis. Cholecystitis cannot be excluded  - T Bili 18, D Bili 14, , , ALT 45    -GI consult in am - Painless jaundice x 3 days, with darker urine  - Drinks 1 pint vodka per day, last drink on 10/29  - RUQ US: No intrahepatic bile duct dilatation visible; the CBD is not visualized due to the limitations of this exam; Cholelithiasis without any convincing evidence for cholecystitis this time  - CTAP: Cholelithiasis with mild gallbladder wall edema and surrounding pericholecystic fluid/ascites in the setting of cirrhosis. Cholecystitis cannot be excluded  - T Bili 18, D Bili 14, , , ALT 45    -GI consult in am  -Hep consult in am  -F/u cirrhosis w/u labs  -F/u coags Differential includes gall stone obstruction, alcohol hepatitis  - Maddrey score 57.8  - Painless jaundice x 3 days, with darker urine  - Drinks 1 pint vodka per day, last drink on 10/29  - RUQ US: No intrahepatic bile duct dilatation visible; the CBD is not visualized due to the limitations of this exam; Cholelithiasis without any convincing evidence for cholecystitis this time  - CTAP: Cholelithiasis with mild gallbladder wall edema and surrounding pericholecystic fluid/ascites in the setting of cirrhosis. Cholecystitis cannot be excluded  - T Bili 18, D Bili 14, , , ALT 45    -GI consulted  - Hep Dr. Hamm consulted   - Needs MRCP  -F/u AMA, smooth muscle Ab, hep panel, AFP  -F/u coags  -For possible alcohol hepatitis, holding off steroids as infection has not been ruled out (F/u UA, CXR, cultures)

## 2024-11-04 NOTE — H&P ADULT - NSHPREVIEWOFSYSTEMS_GEN_ALL_CORE
CONSTITUTIONAL: No fever, weight loss, or fatigue  EYES: (+) scleral icetrus. No eye pain, visual disturbances, or discharge  ENT:  No difficulty hearing, tinnitus, vertigo; No sinus or throat pain  NECK: No pain or stiffness  RESPIRATORY: No cough, wheezing, chills or hemoptysis; No Shortness of Breath  CARDIOVASCULAR: No chest pain, palpitations, passing out, dizziness, or leg swelling  GASTROINTESTINAL: (+) jaundice. No abdominal or epigastric pain. No nausea, vomiting or hematemesis; No diarrhea or constipation. No melena or hematochezia.  GENITOURINARY: (+) darker urine. No dysuria, frequency, hematuria, or incontinence  NEUROLOGICAL: No headaches, memory loss, loss of strength, numbness, or tremors  SKIN: (+) jaundice. No itching, burning, rashes, or lesions   LYMPH Nodes: No enlarged glands  ENDOCRINE: No heat or cold intolerance; No hair loss  MUSCULOSKELETAL: No joint pain or swelling; No muscle, back, No extremity pain  PSYCHIATRIC: No depression, anxiety, mood swings, or difficulty sleeping  HEME/LYMPH: No easy bruising, or bleeding gums  ALLERGY AND IMMUNOLOGIC: No hives or eczema CONSTITUTIONAL: No fever, weight loss, or fatigue  EYES: (+) scleral icterus. No eye pain, visual disturbances, or discharge  ENT:  No difficulty hearing, tinnitus, vertigo; No sinus or throat pain  NECK: No pain or stiffness  RESPIRATORY: No cough, wheezing, chills or hemoptysis; No Shortness of Breath  CARDIOVASCULAR: No chest pain, palpitations, passing out, dizziness, or leg swelling  GASTROINTESTINAL: (+) jaundice. No abdominal or epigastric pain. No nausea, vomiting or hematemesis; No diarrhea or constipation. No melena or hematochezia.  GENITOURINARY: (+) darker urine. No dysuria, frequency, hematuria, or incontinence  NEUROLOGICAL: No headaches, memory loss, loss of strength, numbness, or tremors  SKIN: (+) jaundice. No itching, burning, rashes, or lesions   LYMPH Nodes: No enlarged glands  ENDOCRINE: No heat or cold intolerance; No hair loss  MUSCULOSKELETAL: No joint pain or swelling; No muscle, back, No extremity pain  PSYCHIATRIC: No depression, anxiety, mood swings, or difficulty sleeping  HEME/LYMPH: No easy bruising, or bleeding gums  ALLERGY AND IMMUNOLOGIC: No hives or eczema

## 2024-11-04 NOTE — H&P ADULT - PROBLEM SELECTOR PLAN 8
Home meds: amlodipine 10mg QD, valsartan 320 mg QD Previously took lipitor  stopped 6m ago as per his PCP rx. Home med: Mounjaro  Started ISS  F/u A1c

## 2024-11-04 NOTE — ED ADULT NURSE NOTE - NSFALLUNIVINTERV_ED_ALL_ED
Bed/Stretcher in lowest position, wheels locked, appropriate side rails in place/Call bell, personal items and telephone in reach/Instruct patient to call for assistance before getting out of bed/chair/stretcher/Non-slip footwear applied when patient is off stretcher/[place] to call system/Physically safe environment - no spills, clutter or unnecessary equipment/Purposeful proactive rounding/Room/bathroom lighting operational, light cord in reach

## 2024-11-04 NOTE — ED ADULT TRIAGE NOTE - CHIEF COMPLAINT QUOTE
c/o jaundice to face x 3 days. Patient reports he left AMA x couple of days ago because he had an emergency but came back now to be admitted. Denies any complaints. Tachycardic in Triage.

## 2024-11-04 NOTE — H&P ADULT - PROBLEM SELECTOR PLAN 2
Likely secondary to alcohol  - CT showing cirrhotic liver; Markedly heterogeneous attenuation of the liver parenchyma.   Liver lesions cannot be excluded. Sequelae of portal hypertension including upper abdominal gastroesophageal and anterior abdominal varices. Trace to small ascites.  - Also with alcohol use disorder  - Acute hep panel negative    -Hepatology consult in am Most likely secondary to alcohol use  - As above

## 2024-11-04 NOTE — PATIENT PROFILE ADULT - FALL HARM RISK - RISK INTERVENTIONS
Assistance OOB with selected safe patient handling equipment/Assistance with ambulation/Communicate Fall Risk and Risk Factors to all staff, patient, and family/Monitor for mental status changes/Monitor gait and stability/Reinforce activity limits and safety measures with patient and family/Reorient to person, place and time as needed/Review medications for side effects contributing to fall risk/Sit up slowly, dangle for a short time, stand at bedside before walking/Use of alarms - bed, chair and/or voice tab/Visual Cue: Yellow wristband/Bed in lowest position, wheels locked, appropriate side rails in place/Call bell, personal items and telephone in reach/Instruct patient to call for assistance before getting out of bed or chair/Non-slip footwear when patient is out of bed/[place] to call system/Physically safe environment - no spills, clutter or unnecessary equipment/Purposeful Proactive Rounding/Room/bathroom lighting operational, light cord in reach

## 2024-11-04 NOTE — H&P ADULT - PROBLEM SELECTOR PLAN 5
previously lipitor  stopped 6m ago as per his PCP rx Most likely secondary to cirrhosis  Na 129 on admission  - F/u urine studies  - F/u CMP - No signs of acute cholecystitis  - As above

## 2024-11-04 NOTE — H&P ADULT - ATTENDING COMMENTS
Mr. Ramirez is a 65 y/o male from home w/ PMH of HTN and ETOH use presnts to the hospital w/ 3 days of jaundice. He denies any abdominal pain, fever, chills, weight loss,  ongoing nausea or vomiting. He had vomiting x1,  2 weeks ago but nothing ongoing. Patient reports that he retired in May'24 and since then he has been drinking 1 pint of vodka every day- his last drink was on Oct 31st. Denies any prior hx of ETOH withdrawal syndrome. Patient denies any family hx of prior hx of liver disorder. Denies any recreational drug use or any OTC herbal meds use. Pt was started on Mounjaro 6 months ago.  Of note, patient came to [facility] ED on Nov 2nd, he was admitted for hyperbilirubinemia but signed out AMA due to an Emergency.    In ED, patient's VS were stable except mild tachycardia 109.    Labs reviewed- cbc, bmp, LFTs, D bili, lipase, PT/INR    PE as above  jaundiced sclera and skin  no abd tenderness, or distention  b/l pedal edema- chronic attributes  2/2 amlodipine use    A/P:  #Hyperbilirubinemia- suspect obstructive 2/2  impacted GB neck stone vs alcoholic hepatitis  #Liver Cirrhosis  #Hyponatremia  #AAA  #HTN  #DVT ppx    Plan:  -Patient p/w painless jaundice. Initial CT imaging from Nov 2nd showed- "Cirrhotic liver morphology. Sequelae of portal hypertension including upper abdominal gastroesophageal and anterior abdominal varices. Trace to small ascites. Cholelithiasis with mild gallbladder wall edema and surrounding pericholecystic fluid/ascites in the setting of cirrhosis. Cholecystitis cannot be excluded."  US abdomen from today, " Hepatomegaly. Suspect a combination of hepatocellular disease and fatty liver. Cholelithiasis and distended gallbladder. Possibility of a stone impacted  in the neck of the gallbladder. No sonographic finding to suggest acute cholecystitis.  -T Bili elevated, Direct T bili high. High Gamma Gt. Not suggestive of hemolysis  -Would get MRCP w/ IV contrast to evaluate better. If noted w/ obstruction, would need ERCP. GI consulted- spoke w/ GI NP Mr. Justyn Mirza  -Follow hepatitis panel, rafi, anti-smooth muscle Ab, antimitochondrial ab, AFP , leptospira Ab  -Check blood cx, UA, chest x-ray to evaluate for any infectious etiology  -Given ETOH use, there is also a possibility of alcoholic hepatitis. MDF- 57.8- suggestive of poor prognosis. Will hold off steroids pending above w/u. Hepatologist Dr. Gillis consulted  -No concern of halfway given baseline mental status  -Given cirrhosis, patient would also need EGD for EV screening.  -Sodium low, no s/s of volume overload per se. Patient w/ pedal edema- chronic. s/p 2L IVF Bolus on 2nd Nov in ED- sodium improved from 126-129. Send urine lytes. Give small 250cc IVF bolus and repeat bmp.    -BP stable, resume amlodipine  for now. Hold valsartan given latrell. baseline Scr unknown  -C/w Lovenox SC

## 2024-11-05 DIAGNOSIS — Z75.8 OTHER PROBLEMS RELATED TO MEDICAL FACILITIES AND OTHER HEALTH CARE: ICD-10-CM

## 2024-11-05 DIAGNOSIS — N39.0 URINARY TRACT INFECTION, SITE NOT SPECIFIED: ICD-10-CM

## 2024-11-05 LAB
A1C WITH ESTIMATED AVERAGE GLUCOSE RESULT: 5.1 % — SIGNIFICANT CHANGE UP (ref 4–5.6)
AFP-TM SERPL-MCNC: 2.5 NG/ML — SIGNIFICANT CHANGE UP
ALBUMIN SERPL ELPH-MCNC: 2.2 G/DL — LOW (ref 3.5–5)
ALP SERPL-CCNC: 183 U/L — HIGH (ref 40–120)
ALT FLD-CCNC: 42 U/L DA — SIGNIFICANT CHANGE UP (ref 10–60)
ANION GAP SERPL CALC-SCNC: 8 MMOL/L — SIGNIFICANT CHANGE UP (ref 5–17)
APTT BLD: 36 SEC — HIGH (ref 24.5–35.6)
AST SERPL-CCNC: 131 U/L — HIGH (ref 10–40)
BILIRUB SERPL-MCNC: 17.2 MG/DL — HIGH (ref 0.2–1.2)
BUN SERPL-MCNC: 15 MG/DL — SIGNIFICANT CHANGE UP (ref 7–18)
CALCIUM SERPL-MCNC: 8.9 MG/DL — SIGNIFICANT CHANGE UP (ref 8.4–10.5)
CHLORIDE SERPL-SCNC: 96 MMOL/L — SIGNIFICANT CHANGE UP (ref 96–108)
CO2 SERPL-SCNC: 26 MMOL/L — SIGNIFICANT CHANGE UP (ref 22–31)
CREAT SERPL-MCNC: 1.28 MG/DL — SIGNIFICANT CHANGE UP (ref 0.5–1.3)
EGFR: 63 ML/MIN/1.73M2 — SIGNIFICANT CHANGE UP
ESTIMATED AVERAGE GLUCOSE: 100 MG/DL — SIGNIFICANT CHANGE UP (ref 68–114)
GLUCOSE BLDC GLUCOMTR-MCNC: 135 MG/DL — HIGH (ref 70–99)
GLUCOSE BLDC GLUCOMTR-MCNC: 150 MG/DL — HIGH (ref 70–99)
GLUCOSE BLDC GLUCOMTR-MCNC: 165 MG/DL — HIGH (ref 70–99)
GLUCOSE BLDC GLUCOMTR-MCNC: 199 MG/DL — HIGH (ref 70–99)
GLUCOSE SERPL-MCNC: 153 MG/DL — HIGH (ref 70–99)
HAV IGM SER-ACNC: SIGNIFICANT CHANGE UP
HBV CORE IGM SER-ACNC: SIGNIFICANT CHANGE UP
HBV SURFACE AG SER-ACNC: SIGNIFICANT CHANGE UP
HCT VFR BLD CALC: 27 % — LOW (ref 39–50)
HCV AB S/CO SERPL IA: 0.09 S/CO — SIGNIFICANT CHANGE UP (ref 0–0.99)
HCV AB SERPL-IMP: SIGNIFICANT CHANGE UP
HGB BLD-MCNC: 9.3 G/DL — LOW (ref 13–17)
INR BLD: 1.66 RATIO — HIGH (ref 0.85–1.16)
MAGNESIUM SERPL-MCNC: 1.8 MG/DL — SIGNIFICANT CHANGE UP (ref 1.6–2.6)
MCHC RBC-ENTMCNC: 33 PG — SIGNIFICANT CHANGE UP (ref 27–34)
MCHC RBC-ENTMCNC: 34.4 G/DL — SIGNIFICANT CHANGE UP (ref 32–36)
MCV RBC AUTO: 95.7 FL — SIGNIFICANT CHANGE UP (ref 80–100)
NRBC # BLD: 0 /100 WBCS — SIGNIFICANT CHANGE UP (ref 0–0)
OSMOLALITY UR: 501 MOSM/KG — SIGNIFICANT CHANGE UP (ref 50–1200)
PHOSPHATE SERPL-MCNC: 2.8 MG/DL — SIGNIFICANT CHANGE UP (ref 2.5–4.5)
PLATELET # BLD AUTO: 230 K/UL — SIGNIFICANT CHANGE UP (ref 150–400)
POTASSIUM SERPL-MCNC: 4.1 MMOL/L — SIGNIFICANT CHANGE UP (ref 3.5–5.3)
POTASSIUM SERPL-SCNC: 4.1 MMOL/L — SIGNIFICANT CHANGE UP (ref 3.5–5.3)
PROT SERPL-MCNC: 7.7 G/DL — SIGNIFICANT CHANGE UP (ref 6–8.3)
PROTHROM AB SERPL-ACNC: 19.2 SEC — HIGH (ref 9.9–13.4)
RBC # BLD: 2.82 M/UL — LOW (ref 4.2–5.8)
RBC # FLD: 17.1 % — HIGH (ref 10.3–14.5)
SODIUM SERPL-SCNC: 130 MMOL/L — LOW (ref 135–145)
UUN UR-MCNC: 710 MG/DL — SIGNIFICANT CHANGE UP
WBC # BLD: 9.61 K/UL — SIGNIFICANT CHANGE UP (ref 3.8–10.5)
WBC # FLD AUTO: 9.61 K/UL — SIGNIFICANT CHANGE UP (ref 3.8–10.5)

## 2024-11-05 PROCEDURE — 99233 SBSQ HOSP IP/OBS HIGH 50: CPT

## 2024-11-05 PROCEDURE — 99223 1ST HOSP IP/OBS HIGH 75: CPT

## 2024-11-05 PROCEDURE — 74183 MRI ABD W/O CNTR FLWD CNTR: CPT | Mod: 26

## 2024-11-05 RX ORDER — CEFTRIAXONE SODIUM 1 G
1000 VIAL (EA) INJECTION EVERY 24 HOURS
Refills: 0 | Status: DISCONTINUED | OUTPATIENT
Start: 2024-11-05 | End: 2024-11-06

## 2024-11-05 RX ORDER — TIRZEPATIDE 2.5 MG/.5ML
7.5 INJECTION, SOLUTION SUBCUTANEOUS
Refills: 0 | DISCHARGE

## 2024-11-05 RX ORDER — AMLODIPINE BESYLATE 10 MG/1
1 TABLET ORAL
Refills: 0 | DISCHARGE

## 2024-11-05 RX ADMIN — Medication 1: at 16:47

## 2024-11-05 RX ADMIN — Medication 100 MILLIGRAM(S): at 14:28

## 2024-11-05 RX ADMIN — Medication 1: at 07:53

## 2024-11-05 RX ADMIN — ENOXAPARIN SODIUM 40 MILLIGRAM(S): 30 INJECTION SUBCUTANEOUS at 18:18

## 2024-11-05 RX ADMIN — AMLODIPINE BESYLATE 10 MILLIGRAM(S): 10 TABLET ORAL at 05:17

## 2024-11-05 NOTE — CONSULT NOTE ADULT - SUBJECTIVE AND OBJECTIVE BOX
[  ] STAT REQUEST              [ X ] ROUTINE REQUEST    Patient is a 64 year old male with jaundice. GI consulted to evaluate.        HPI:  64 year old male with past medical history significant for HTN, DM, ETOH abuse, Hyperlipidemia, presented to the emergency room with 3 days history of jaundice associated with non bloody vomiting and dark color urinr. Patient reported drinking ETOH heavily over past 6 months (1 pint vodka daily). patient denies abdominal pain, hematemesis, hematochezia, melena, fever, chills, chest pain, SOB, cough, hematuria, dysuria or diarrhea.       PAIN MANAGEMENT:  Pain Scale:                0 /10  Pain Location:         PAST MEDICAL HISTORY    HTN (hypertension)    Hyperlipidemia     Diabetes mellitus    ETOH abuse        PAST SURGICAL HISTORY    No significant surgical history reported        Allergies    No Known Allergies    Intolerances  None         MEDICATIONS  (STANDING):  amLODIPine   Tablet 10 milliGRAM(s) Oral daily  cefTRIAXone   IVPB 1000 milliGRAM(s) IV Intermittent every 24 hours  enoxaparin Injectable 40 milliGRAM(s) SubCutaneous every 24 hours  influenza   Vaccine 0.5 milliLiter(s) IntraMuscular once  insulin lispro (ADMELOG) corrective regimen sliding scale   SubCutaneous three times a day before meals  insulin lispro (ADMELOG) corrective regimen sliding scale   SubCutaneous at bedtime        SOCIAL HISTORY  Advanced Directives:       [ X ] Full Code       [  ] DNR  Marital Status:         [  ] M      [ X ] S      [  ] D       [  ] W  Children:       [ X ] Yes      [  ] No  Occupation:        [  ] Employed       [ X ] Unemployed       [  ] Retired  Diet:       [ X ] Regular       [  ] PEG feeding          [  ] NG tube feeding  Drug Use:           [ X ] Patient denied          [  ] Yes  Alcohol:           [  ] No             [  ] Yes (socially)         [ X ] Yes (chronic)  Tobacco:           [  ] Yes           [ X ] No      FAMILY HISTORY  [ X ] Heart Disease            [ X ] Diabetes             [ X ] HTN             [  ] Colon Cancer             [  ] Stomach Cancer              [  ] Pancreatic Cancer      VITAL SIGNS  Vital Signs Last 24 Hrs  T(C): 37.1 (11-05-24 @ 05:21), Max: 37.3 (11-04-24 @ 09:48)  T(F): 98.7 (11-05-24 @ 05:21), Max: 99.1 (11-04-24 @ 09:48)  HR: 100 (11-05-24 @ 05:21) (100 - 115)  BP: 134/77 (11-05-24 @ 05:21) (123/76 - 158/88)   RR: 17 (11-05-24 @ 05:21) (17 - 18)  SpO2: 96% (11-05-24 @ 05:21) (96% - 98%)   Daily Height in cm: 182.88 (04 Nov 2024 20:38)             CBC Full  -  ( 05 Nov 2024 06:50 )  WBC Count : 9.61 K/uL  RBC Count : 2.82 M/uL  Hemoglobin : 9.3 g/dL  Hematocrit : 27.0 %  Platelet Count - Automated : 230 K/uL  Mean Cell Volume : 95.7 fl  Mean Cell Hemoglobin : 33.0 pg  Mean Cell Hemoglobin Concentration : 34.4 g/dL  Auto Neutrophil # : x  Auto Lymphocyte # : x  Auto Monocyte # : x  Auto Eosinophil # : x  Auto Basophil # : x  Auto Neutrophil % : x  Auto Lymphocyte % : x  Auto Monocyte % : x  Auto Eosinophil % : x  Auto Basophil % : x      11-05    130[L]  |  96  |  15  ----------------------------<  153[H]  4.1   |  26  |  x     Ca    8.9      05 Nov 2024 06:50  Phos  2.8     11-05  Mg     1.8     11-05    TPro  x   /  Alb  2.2[L]  /  TBili  x   /  DBili  x   /  AST  x   /  ALT  x   /  AlkPhos  x   11-05      Lipase: 203 U/L (11-04 @ 13:00)     PT/INR - ( 05 Nov 2024 06:50 )   PT: 19.2 sec;   INR: 1.66 ratio       PTT - ( 05 Nov 2024 06:50 )  PTT:36.0 sec      Urinalysis (11.04.24 @ 21:04)   pH Urine: 6.0  Glucose Qualitative, Urine: Negative mg/dL  Blood, Urine: Negative  Color: Dark Yellow  Urine Appearance: Cloudy  Bilirubin: Large  Ketone - Urine: Negative mg/dL  Specific Gravity: 1.021  Protein, Urine: Trace mg/dL  Urobilinogen: 1.0 mg/dL  Nitrite: Positive  Leukocyte Esterase Concentration: Small    ECG (11.02.24 @ 12:04)     Ventricular Rate 104 BPM    Atrial Rate 104 BPM    P-R Interval 154 ms    QRS Duration 90 ms    Q-T Interval 352 ms    QTC Calculation(Bazett) 462 ms    P Axis 31 degrees    R Axis -14 degrees    T Axis 19 degrees    Diagnosis Line Sinus tachycardia  Possible Left atrial enlargement  Left ventricular hypertrophy  Abnormal ECG         RADIOLOGY/IMAGING                ACC: [id number] EXAM:  CT ABDOMEN AND PELVIS IC   ORDERED BY: FREDDIE MOCK     PROCEDURE DATE:  11/02/2024          INTERPRETATION:  CLINICAL INFORMATION: Painless jaundice, concern for   cirrhosis    COMPARISON: None.    CONTRAST/COMPLICATIONS:  IV Contrast: Omnipaque 350  90 cc administered   10 cc discarded  Oral Contrast: NONE  Complications: None reported at time of study completion    PROCEDURE:  CT of the Abdomen and Pelvis was performed.  Sagittal and coronal reformats were performed.    FINDINGS:    LOWER CHEST: No visualized pleural effusion. Bibasilar atelectasis.   Multivessel coronary artery calcifications, aortic root/valve   calcification, and mitral annular calcification.    LIVER: Cirrhotic liver morphology. Enlarged liver, 24 cm in craniocaudal   dimension. Markedly heterogeneous attenuation of the liver parenchyma.   Liver lesions cannot be excluded. Narrowed appearance of the intrahepatic   IVC.  BILE DUCTS: No distention  GALLBLADDER: Cholelithiasis with mild gallbladder wall edema and   surrounding pericholecystic fluid/ascites in the setting of cirrhosis.   Cholecystitis cannot be excluded.  SPLEEN: Enlarged, 15 cm in anterior to posterior dimension  PANCREAS: No acute peripancreatic inflammation  ADRENALS: Unremarkable  KIDNEYS/URETERS: No hydronephrosis or obstructing ureteral calculus    BLADDER: Minimally distended.  REPRODUCTIVE ORGANS: Prostate is enlarged    BOWEL: Upper abdominal varices and paraesophageal/perigastric, and   anterior abdominal locations. Stomach is underdistended. No small bowel   distention. Appendix is not obstructed. Right-sided colonic thickening   may be due to portal gastropathy versus colitis. Colon is overall   nondistended with minimal stool burden.  PERITONEUM/RETROPERITONEUM: Trace ascites. Generalized mild mesenteric   haziness. Small volume nodes not enlarged by CT size criteria. The small  VESSELS: 3.2 x 3.0 cm abdominal aortic aneurysm. Right common iliac   artery is ectatic measuring 1.6 cm. Aortoiliac andvisceral artery   calcified plaque.  LYMPH NODES: No enlargement has by CT size criteria.  ABDOMINAL WALL: Small fat-containing umbilical hernia. Soft tissue edema.  BONES: Degenerative changes of the bones.    IMPRESSION:    Cirrhotic liver morphology. Enlarged liver, 24 cm in craniocaudal   dimension. Markedly heterogeneous attenuation of the liver parenchyma.   Liver lesions cannot be excluded. Recommend contrast enhanced abdominal   MRI for further evaluation. Correlate with AFP level. Sequelae of portal   hypertension including upper abdominal gastroesophageal and anterior   abdominal varices. Trace to small ascites.    Cholelithiasis with mild gallbladder wall edema and surrounding   pericholecystic fluid/ascites in the setting of cirrhosis. Cholecystitis   cannot be excluded.    3.2 x 3.0 cm abdominal aortic aneurysm. Right common iliac artery is   ectatic measuring 1.6 cm. Follow-up CT abdomen and pelvis is recommended   6 months for reevaluation. Multivessel coronary artery calcifications,   aortic root/valve calcification, and mitral annular calcification.          ACC: [id number] EXAM:  US ABDOMEN RT UPR QUADRANT   ORDERED BY:  MAX LAZARUS     PROCEDURE DATE:  11/04/2024          INTERPRETATION:  CLINICAL INFORMATION: Jaundice.    COMPARISON: 11/02/2024.    TECHNIQUE: Sonography of the right upper quadrant.    FINDINGS:  Liver: Increased echogenicity. Nodular contour. These findings suggest a   combination of hepatocellular disease and steatosis. The liver is   enlarged measuring approximately 21 cm. No focal hepatic mass lesion was   identified.  Bile ducts: Mildly prominent. Common bile duct measures 8 mm.  Gallbladder: Cholelithiasis. Distended gallbladder. Possibility of a 1.8   cm sized stone impacted in the neck of the gallbladder. Trace nonspecific   pericholecystic fluid. Negative Traylor's sign.  Pancreas: Poorly visualized.  Right kidney: 10.6 cm. No hydronephrosis.  Ascites: None.  IVC: Visualized portions are within normal limits.    IMPRESSION:  Hepatomegaly. Suspect a combination of hepatocellular disease and fatty   liver.  Cholelithiasis and distended gallbladder. Possibility of a stone impacted   in the neck of the gallbladder. No sonographic finding to suggest acute   cholecystitis considering negative Traylor's sign.  Mildly prominent CBD. Consider correlation with MRCP.

## 2024-11-05 NOTE — CONSULT NOTE ADULT - ASSESSMENT
1. Painless jaundice  2. Transaminitis  3. Alcoholic cirrhosis  4. Portal HTN  5. Paraesophageal/perigastric varices  6. Cholelithiasis with gallbladder wall edema and pericholecystic fluid  7. R/o cholecystitis  8. Ascites  9. ETOH abuse  10. Anemia  11. No evidence of acute GI bleeding    Suggestions:    1. Follow up LFT's  2. MRCP / MRI  3. Surgical evaluation  4. B-blocker therapy  5. Lactulose daily  6. Protonix 40mg daily  7. DT's precaution  8. Hepatology evaluation  9. Monitor H/H  10. Transfuse PRBC as needed  11. EGD  12. Check AFP  13. Diagnostic and therapeutic paracentesis  14. Thiamine / Folate / MVI  15. DVT prophylaxis

## 2024-11-05 NOTE — PROGRESS NOTE ADULT - PROBLEM SELECTOR PLAN 1
Differential includes gall stone obstruction, alcohol hepatitis  - Maddrey score 57.8  - Painless jaundice x 3 days, with darker urine  - Drinks 1 pint vodka per day, last drink on 10/29  - RUQ US: No intrahepatic bile duct dilatation visible; the CBD is not visualized due to the limitations of this exam; Cholelithiasis without any convincing evidence for cholecystitis this time  - CTAP: Cholelithiasis with mild gallbladder wall edema and surrounding pericholecystic fluid/ascites in the setting of cirrhosis. Cholecystitis cannot be excluded  - T Bili 18, D Bili 14, , , ALT 45    -GI consulted  - Hep Dr. Hamm consulted   -F/U MRCP, will need ERCP if +  -F/u AMA, smooth muscle Ab, hep panel, AFP  -F/u coags  -For possible alcohol hepatitis, holding off steroids as infection has not been ruled out (F/u Blood & UCXR, cultures)

## 2024-11-05 NOTE — PROGRESS NOTE ADULT - PROBLEM SELECTOR PLAN 5
Drinks 1 pint vodka since 5/2024  Last drink on 10/29  No s/s etoh withdrawal  Monitor for [facility]  Social work consulted

## 2024-11-05 NOTE — PROGRESS NOTE ADULT - SUBJECTIVE AND OBJECTIVE BOX
NP Note discussed with  primary attending    Patient is a 64y old  Male who presents with a chief complaint of painless jaundice (05 Nov 2024 11:07)      INTERVAL HPI/OVERNIGHT EVENTS: no new complaints    MEDICATIONS  (STANDING):  amLODIPine   Tablet 10 milliGRAM(s) Oral daily  cefTRIAXone   IVPB 1000 milliGRAM(s) IV Intermittent every 24 hours  enoxaparin Injectable 40 milliGRAM(s) SubCutaneous every 24 hours  influenza   Vaccine 0.5 milliLiter(s) IntraMuscular once  insulin lispro (ADMELOG) corrective regimen sliding scale   SubCutaneous at bedtime  insulin lispro (ADMELOG) corrective regimen sliding scale   SubCutaneous three times a day before meals    MEDICATIONS  (PRN):      __________________________________________________  REVIEW OF SYSTEMS:    CONSTITUTIONAL: No fever,   EYES: no acute visual disturbances  NECK: No pain or stiffness  RESPIRATORY: No cough; No shortness of breath  CARDIOVASCULAR: No chest pain, no palpitations  GASTROINTESTINAL: No pain. No nausea or vomiting; No diarrhea   NEUROLOGICAL: No headache or numbness, no tremors  MUSCULOSKELETAL: No joint pain, no muscle pain  GENITOURINARY: no dysuria, no frequency, no hesitancy  PSYCHIATRY: no depression , no anxiety  ALL OTHER  ROS negative        Vital Signs Last 24 Hrs  T(C): 37.1 (05 Nov 2024 05:21), Max: 37.1 (05 Nov 2024 05:21)  T(F): 98.7 (05 Nov 2024 05:21), Max: 98.7 (05 Nov 2024 05:21)  HR: 100 (05 Nov 2024 05:21) (100 - 105)  BP: 134/77 (05 Nov 2024 05:21) (123/76 - 158/88)  BP(mean): --  RR: 17 (05 Nov 2024 05:21) (17 - 18)  SpO2: 96% (05 Nov 2024 05:21) (96% - 98%)    Parameters below as of 05 Nov 2024 05:21  Patient On (Oxygen Delivery Method): room air        ________________________________________________  PHYSICAL EXAM:  GENERAL: NAD  HEENT: Normocephalic;  conjunctivae and sclerae clear; moist mucous membranes;   NECK : supple  CHEST/LUNG: Clear to ausculitation bilaterally with good air entry   HEART: S1 S2  regular; no murmurs, gallops or rubs  ABDOMEN: Soft, Nontender, Nondistended; Bowel sounds present  EXTREMITIES: no cyanosis; no edema; no calf tenderness  SKIN: + jaundice, warm and dry; no rash  NERVOUS SYSTEM:  Awake and alert; Oriented  to place, person and time ; no new deficits    _________________________________________________  LABS:                        9.3    9.61  )-----------( 230      ( 05 Nov 2024 06:50 )             27.0     11-05    130[L]  |  96  |  15  ----------------------------<  153[H]  4.1   |  26  |  1.28    Ca    8.9      05 Nov 2024 06:50  Phos  2.8     11-05  Mg     1.8     11-05    TPro  7.7  /  Alb  2.2[L]  /  TBili  17.2[H]  /  DBili  x   /  AST  131[H]  /  ALT  42  /  AlkPhos  183[H]  11-05    PT/INR - ( 05 Nov 2024 06:50 )   PT: 19.2 sec;   INR: 1.66 ratio         PTT - ( 05 Nov 2024 06:50 )  PTT:36.0 sec  Urinalysis Basic - ( 05 Nov 2024 06:50 )    Color: x / Appearance: x / SG: x / pH: x  Gluc: 153 mg/dL / Ketone: x  / Bili: x / Urobili: x   Blood: x / Protein: x / Nitrite: x   Leuk Esterase: x / RBC: x / WBC x   Sq Epi: x / Non Sq Epi: x / Bacteria: x      CAPILLARY BLOOD GLUCOSE      POCT Blood Glucose.: 135 mg/dL (05 Nov 2024 11:45)  POCT Blood Glucose.: 199 mg/dL (05 Nov 2024 07:40)  POCT Blood Glucose.: 128 mg/dL (04 Nov 2024 21:17)        RADIOLOGY & ADDITIONAL TESTS:  < from: US Abdomen Upper Quadrant Right (11.04.24 @ 11:34) >  IMPRESSION:  Hepatomegaly. Suspect a combination of hepatocellular disease and fatty   liver.  Cholelithiasis and distended gallbladder. Possibility of a stone impacted   in the neck of the gallbladder. No sonographic finding to suggest acute   cholecystitis considering negative Traylor's sign.  Mildly prominent CBD. Consider correlation with MRCP.        < end of copied text >  < from: CT Abdomen and Pelvis w/ IV Cont (11.02.24 @ 16:43) >  Cirrhotic liver morphology. Enlarged liver, 24 cm in craniocaudal   dimension. Markedly heterogeneous attenuation of the liver parenchyma.   Liver lesions cannot be excluded. Recommend contrast enhanced abdominal   MRI for further evaluation. Correlate with AFP level. Sequelae of portal   hypertension including upper abdominal gastroesophageal and anterior   abdominal varices. Trace to small ascites.    Cholelithiasis with mild gallbladder wall edema and surrounding   pericholecystic fluid/ascites in the setting of cirrhosis. Cholecystitis   cannot be excluded.    3.2 x 3.0 cm abdominal aortic aneurysm. Right common iliac artery is   ectatic measuring 1.6 cm. Follow-up CT abdomen and pelvis is recommended   6 months for reevaluation. Multivessel coronary artery calcifications,   aortic root/valve calcification, and mitral annular calcification.      < end of copied text >    Imaging Personally Reviewed:  YES    Consultant(s) Notes Reviewed:   YES    Care Discussed with Consultants :     Plan of care was discussed with patient and /or primary care giver; all questions and concerns were addressed and care was aligned with patient's wishes.

## 2024-11-05 NOTE — PROGRESS NOTE ADULT - ASSESSMENT
A/P:    Labs reviewed:     Imaging reviewed:     I independently reviewed:     History obtained from:    Discussed with (consultant), plan is...    Monitoring drug for toxicity for     Continue IV opioid?    Total Time Spent____ minutes  This includes chart review, patient assessment, discussion and collaboration with interdisciplinary team members, excluding ACP.     A/P:  Mr. Ramirez is a 63 y/o man from home w/ PMH of HTN and ETOH use presents to the hospital w/ 3 days of jaundice.  He had vomiting x1,  2 weeks ago but nothing ongoing. Patient reports that he retired in May'24 and since then he has been drinking 1 pint of vodka every day- his last drink was on Oct 31st. Pt was started on Mounjaro 6 months ago.  Of note, patient came to [facility] ED on Nov 2nd, he was admitted for hyperbilirubinemia but signed out AMA due to an Emergency.    Labs reviewed:                         9.3    9.61  )-----------( 230      ( 05 Nov 2024 06:50 )             27.0   11-05    130[L]  |  96  |  15  ----------------------------<  153[H]  4.1   |  26  |  1.28    Ca    8.9      05 Nov 2024 06:50  Phos  2.8     11-05  Mg     1.8     11-05    TPro  7.7  /  Alb  2.2[L]  /  TBili  17.2[H]  /  DBili  x   /  AST  131[H]  /  ALT  42  /  AlkPhos  183[H]  11-05    Imaging reviewed:   CT:   Cirrhotic liver morphology. Enlarged liver, 24 cm in craniocaudal   dimension. Markedly heterogeneous attenuation of the liver parenchyma.   Liver lesions cannot be excluded. Recommend contrast enhanced abdominal   MRI for further evaluation. Correlate with AFP level. Sequelae of portal   hypertension including upper abdominal gastroesophageal and anterior   abdominal varices. Trace to small ascites.    Cholelithiasis with mild gallbladder wall edema and surrounding   pericholecystic fluid/ascites in the setting of cirrhosis. Cholecystitis   cannot be excluded.    3.2 x 3.0 cm abdominal aortic aneurysm. Right common iliac artery is   ectatic measuring 1.6 cm. Follow-up CT abdomen and pelvis is recommended   6 months for reevaluation. Multivessel coronary artery calcifications,   aortic root/valve calcification, and mitral annular calcification.    Enlarged prostate.  I independently reviewed:     Hepatomegaly. Suspect a combination of hepatocellular disease and fatty   liver.  Cholelithiasis and distended gallbladder. Possibility of a stone impacted   in the neck of the gallbladder. No sonographic finding to suggest acute   cholecystitis considering negative Traylor's sign.  Mildly prominent CBD. Consider correlation with MRCP.  History obtained from:    #Hyperbilirubinemia- suspect obstructive 2/2  impacted GB neck stone vs alcoholic hepatitis  #Liver Cirrhosis  #Hyponatremia  #AAA  #HTN  #DVT ppx    Plan:  - Suspect a combination of hepatocellular disease and fatty liver. Cholelithiasis and distended gallbladder. Possibility of a stone impacted  in the neck of the gallbladder. No sonographic finding to suggest acute cholecystitis.  -T Bili elevated, Direct T bili high. High Gamma Gt. Not suggestive of hemolysis  -Would get MRCP w/ IV contrast to evaluate better. If noted w/ obstruction, would need ERCP. Discussed with GI consulted- spoke w/ GI NP Mr. Justyn Mirza, pending MRCP  -Follow hepatitis panel, rafi, anti-smooth muscle Ab, antimitochondrial ab, AFP , leptospira Ab  -f/u blood cx, UA  -continue IV ceftriaxone  -Given ETOH use, there is also a possibility of alcoholic hepatitis. MDF- 57.8- suggestive of poor prognosis. Will hold off steroids pending above w/u. Hepatologist Dr. Gillis consulted  -No concern of KEREN given baseline mental status  -Given cirrhosis, patient would also need EGD for EV screening.  -Sodium low, no s/s of volume overload per se. Patient w/ pedal edema- chronic. s/p 2L IVF Bolus on 2nd Nov in ED- sodium improved from 126-129. Monitor Na closely  -BP stable, resume amlodipine  for now. Hold valsartan given latrell. baseline Scr unknown  -C/w Lovenox SC .       Total Time Spent 50 minutes  This includes chart review, patient assessment, discussion and collaboration with interdisciplinary team members, excluding ACP.

## 2024-11-05 NOTE — CONSULT NOTE ADULT - ASSESSMENT
64 Y M H/O HTN, HLD, DM, alcohol use disorder, presents with new onset painless jaundice. CT and US showing new cirrhosis, varices, gallstones, and could not r/o cholecystitis. 65 yo overweight (BMI 29) Male originally from 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 He noted the jaundice for short time, but has been noticing darkening of his urine since 8/2024 gradually.   CT a/p w/ iv 11/2/24 showed enlarged liver (24 cm) w/ cirrhotic morphology, markedly heterogenous liver parenchyma, signs of portal hypertension (varices) cholelithiasis w/ mild GB wall thickening (reported that cholecystitis could not be excluded), and aortic aneurysm.   Subsequent MR/MRCP w/wo 11/5/24 showed enlarged, cirrhotic liver w/ portal hypertension (splenomegaly, varices, trace ascites), cholelithiasis and adenomyomatosis of GB, but no biliary obstruction.       Jaundice  Hepatomegaly  Cirrhosis   Portal hypertension  Suspected alcoholic hepatitis (AH) superimposed to cirrhosis (MetALD)  Trace ascites  TODD - resolved    - MRCP did not show obstruction  - No portal  or hepatic vein thrombosis reported  - Acute viral hepatitis panel neg  - CXR neg, Ascites is trace, unlikely to be sufficient for Dx paracentesis  - Noted pos UA, on ceftriaxone, to complete treatment  - F/u rest of viral and AI workup, including HBcAb, HBsAb, HBcAb IgM, HCV RNA, Hep E IgM/PCR, EBV/CMV/HSV/VZV PCRs, CYNTHIA, SMA, LKM, AMA, Ig panel, ceruloplasmin, iron studies/ferritin, A1AT, leptospirosis serology  - Monitor MELD labs  - Once UTI adequately treated, prednisolone +/- NAC to be considered    AUD  - Folic, thiamine  - CIWA per primary team  - SW involvement for rehab      Thank you for consult  Hepatology will follow  Was d/w Dr. Boyce

## 2024-11-05 NOTE — CONSULT NOTE ADULT - SUBJECTIVE AND OBJECTIVE BOX
PGY-1 Progress Note discussed with attending    PAGER #: [phone]] TILL 5:00 PM  PLEASE CONTACT ON CALL TEAM:  - On Call Team (Please refer to Vickie) FROM 5:00 PM - 8:30PM  - Nightfloat Team FROM 8:30 -7:30 AM    HISTORY OF PRESENT ILLNESS:  64 Y M H/O HTN, HLD, DM, alcohol use disorder, presents for painless jaundice x 3 days. Also reports 1 episode of vomiting (white, NBNB) one week ago, as well as darker urine for 1 week. Pt has history of heavy drinking for past 6 months, 1 pint vodka per day, last drink on 10/29. Denies itching, abdominal pain, nausea, fever, chills.    In the ED, PT 19, INR 1.68. Na 129, Cr 1.3. T Bili 18, D Bili 14, , , ALT 45, lipase 203.    CTAP: cirrhosis, hepatomegaly, portal HTN, varices, gall stones, cholecystic wall edema, pericholecystic fluid  Abd US: hepatomegaly, gall stones, distended gall bladder, negative sonographic Traylor, mild prominent CBD      REVIEW OF SYSTEMS:  CONSTITUTIONAL: No fever, chills,  or fatigue  RESPIRATORY: No cough, wheezing, No shortness of breath  CARDIOVASCULAR: No chest pain, palpitations,  leg swelling  GASTROINTESTINAL: No abdominal pain. No nausea, vomiting, or hematemesis; No diarrhea or constipation. No bloody or black stool  GENITOURINARY: No dysuria or hematuria, urinary frequency  NEUROLOGICAL: No headaches, dizziness  SKIN: No itching, burning, rashes, or lesions     Vital Signs Last 24 Hrs  T(C): 37.1 (05 Nov 2024 05:21), Max: 37.1 (05 Nov 2024 05:21)  T(F): 98.7 (05 Nov 2024 05:21), Max: 98.7 (05 Nov 2024 05:21)  HR: 100 (05 Nov 2024 05:21) (100 - 105)  BP: 134/77 (05 Nov 2024 05:21) (123/76 - 158/88)  BP(mean): --  RR: 17 (05 Nov 2024 05:21) (17 - 18)  SpO2: 96% (05 Nov 2024 05:21) (96% - 98%)    Parameters below as of 05 Nov 2024 05:21  Patient On (Oxygen Delivery Method): room air        PHYSICAL EXAMINATION:  GENERAL: NAD,   HEAD:  Atraumatic, Normocephalic  EYES:  PERRLA,  conjunctiva and sclera clear  NECK: Supple,    CHEST WALL: Nontender  LUNG: Clear to auscultation. No rales, rhonchi, wheezing, or rubs  HEART: Regular rate and rhythm; No murmurs,  ABDOMEN: Soft, Nontender, Nondistended; Bowel sounds present, No Rovsing's sign   NERVOUS SYSTEM:  Alert & Oriented X3,    EXTREMITIES:  2+ Peripheral Pulses, No clubbing, cyanosis, or edema  CALF: No Calf tenderness   SKIN: warm dry                          9.3    9.61  )-----------( 230      ( 05 Nov 2024 06:50 )             27.0     11-05    130[L]  |  96  |  15  ----------------------------<  153[H]  4.1   |  26  |  1.28    Ca    8.9      05 Nov 2024 06:50  Phos  2.8     11-05  Mg     1.8     11-05    TPro  7.7  /  Alb  2.2[L]  /  TBili  17.2[H]  /  DBili  x   /  AST  131[H]  /  ALT  42  /  AlkPhos  183[H]  11-05    LIVER FUNCTIONS - ( 05 Nov 2024 06:50 )  Alb: 2.2 g/dL / Pro: 7.7 g/dL / ALK PHOS: 183 U/L / ALT: 42 U/L DA / AST: 131 U/L / GGT: x               PT/INR - ( 05 Nov 2024 06:50 )   PT: 19.2 sec;   INR: 1.66 ratio         PTT - ( 05 Nov 2024 06:50 )  PTT:36.0 sec    CAPILLARY BLOOD GLUCOSE      RADIOLOGY & ADDITIONAL TESTS:    On admission:    US Abdomen:  Hepatomegaly (hepatocellular disease vs fatty liver)  Cholelithiasis and distended gallbladder. Possibility of a stone impacted   in the neck of the gallbladder.  Mildly prominent CBD. Consider correlation with MRCP.    CTAP:  Cirrhotic liver morphology. Enlarged liver, 24 cm in craniocaudal   dimension. Markedly heterogeneous attenuation of the liver parenchyma.   Liver lesions cannot be excluded. Recommend contrast enhanced abdominal   MRI for further evaluation. Correlate with AFP level. Sequelae of portal   hypertension including upper abdominal gastroesophageal and anterior   abdominal varices. Trace to small ascites.    Cholelithiasis with mild gallbladder wall edema and surrounding   pericholecystic fluid/ascites in the setting of cirrhosis. Cholecystitis   cannot be excluded.    3.2 x 3.0 cm abdominal aortic aneurysm. Right common iliac artery is   ectatic measuring 1.6 cm. Follow-up CT abdomen and pelvis is recommended   6 months for reevaluation. Multivessel coronary artery calcifications,   aortic root/valve calcification, and mitral annular calcification.

## 2024-11-05 NOTE — PROGRESS NOTE ADULT - SUBJECTIVE AND OBJECTIVE BOX
CC: jaundice  S:  denies any pain  O:  Vital Signs Last 24 Hrs  T(C): 37.1 (11-05-24 @ 05:21), Max: 37.1 (11-05-24 @ 05:21)  T(F): 98.7 (11-05-24 @ 05:21), Max: 98.7 (11-05-24 @ 05:21)  HR: 100 (11-05-24 @ 05:21) (100 - 105)  BP: 134/77 (11-05-24 @ 05:21) (123/76 - 158/88)  RR: 17 (11-05-24 @ 05:21) (17 - 18)  SpO2: 96% (11-05-24 @ 05:21) (96% - 98%)  PE:  Gen: Oriented, alert, No acute distress Eyes: conjunctivae and lid normal, scleral icterus ENT: nose and throat exam normal CVS: S1, S2, RRR;  no murmur, no rubs, no gallops Pulm: Good air exchange, Breath sounds equal bilaterally, no rales rhonchi,  no wheezes Chest: nontender, no chest deformity, chest movement symmetrical Gl: abdomen soft, nontender, nondistended, bowel sounds normoactive, no masses palpated Musk: no msk pain, no joint swelling Skin: no skin lesions, skin turgor normal, warm and well perfused Neuro: Awake, alert,Psych: normal affect, insight       CC: jaundice  S:  denies any pain  O:  Vital Signs Last 24 Hrs  T(C): 37.1 (11-05-24 @ 05:21), Max: 37.1 (11-05-24 @ 05:21)  T(F): 98.7 (11-05-24 @ 05:21), Max: 98.7 (11-05-24 @ 05:21)  HR: 100 (11-05-24 @ 05:21) (100 - 105)  BP: 134/77 (11-05-24 @ 05:21) (123/76 - 158/88)  RR: 17 (11-05-24 @ 05:21) (17 - 18)  SpO2: 96% (11-05-24 @ 05:21) (96% - 98%)  PE:  Gen: Oriented, alert, No acute distress Eyes: conjunctivae and lid normal, scleral icterus ENT: nose and throat exam normal CVS: S1, S2, RRR;  no murmur, no rubs, no gallops Pulm: Good air exchange, Breath sounds equal bilaterally, no rales rhonchi,  no wheezes Chest: nontender, no chest deformity, chest movement symmetrical Gl: abdomen soft, nontender, nondistended, bowel sounds normoactive, no masses palpated Musk: no msk pain, no joint swelling Skin: jaundice,  no skin lesions, skin turgor normal, warm and well perfused Neuro: Awake, alert,Psych: normal affect, insight

## 2024-11-05 NOTE — CONSULT NOTE ADULT - NEGATIVE GASTROINTESTINAL SYMPTOMS
no nausea/no diarrhea/no constipation/no abdominal pain/no melena/no hematochezia/no steatorrhea/no hiccoughs

## 2024-11-06 PROBLEM — Z78.9 OTHER SPECIFIED HEALTH STATUS: Chronic | Status: ACTIVE | Noted: 2020-03-08

## 2024-11-06 LAB
ALBUMIN SERPL ELPH-MCNC: 2.1 G/DL — LOW (ref 3.5–5)
ALP SERPL-CCNC: 163 U/L — HIGH (ref 40–120)
ALT FLD-CCNC: 45 U/L DA — SIGNIFICANT CHANGE UP (ref 10–60)
ANION GAP SERPL CALC-SCNC: 8 MMOL/L — SIGNIFICANT CHANGE UP (ref 5–17)
AST SERPL-CCNC: 134 U/L — HIGH (ref 10–40)
BILIRUB SERPL-MCNC: 17 MG/DL — HIGH (ref 0.2–1.2)
BUN SERPL-MCNC: 15 MG/DL — SIGNIFICANT CHANGE UP (ref 7–18)
CALCIUM SERPL-MCNC: 8.8 MG/DL — SIGNIFICANT CHANGE UP (ref 8.4–10.5)
CHLORIDE SERPL-SCNC: 101 MMOL/L — SIGNIFICANT CHANGE UP (ref 96–108)
CO2 SERPL-SCNC: 23 MMOL/L — SIGNIFICANT CHANGE UP (ref 22–31)
CREAT SERPL-MCNC: 1.22 MG/DL — SIGNIFICANT CHANGE UP (ref 0.5–1.3)
CULTURE RESULTS: SIGNIFICANT CHANGE UP
EGFR: 67 ML/MIN/1.73M2 — SIGNIFICANT CHANGE UP
GLUCOSE BLDC GLUCOMTR-MCNC: 137 MG/DL — HIGH (ref 70–99)
GLUCOSE BLDC GLUCOMTR-MCNC: 176 MG/DL — HIGH (ref 70–99)
GLUCOSE BLDC GLUCOMTR-MCNC: 198 MG/DL — HIGH (ref 70–99)
GLUCOSE BLDC GLUCOMTR-MCNC: 218 MG/DL — HIGH (ref 70–99)
GLUCOSE SERPL-MCNC: 148 MG/DL — HIGH (ref 70–99)
HCT VFR BLD CALC: 26.6 % — LOW (ref 39–50)
HGB BLD-MCNC: 9.5 G/DL — LOW (ref 13–17)
INR BLD: 1.87 RATIO — HIGH (ref 0.85–1.16)
LIDOCAIN IGE QN: 231 U/L — HIGH (ref 13–75)
MCHC RBC-ENTMCNC: 33.8 PG — SIGNIFICANT CHANGE UP (ref 27–34)
MCHC RBC-ENTMCNC: 35.7 G/DL — SIGNIFICANT CHANGE UP (ref 32–36)
MCV RBC AUTO: 94.7 FL — SIGNIFICANT CHANGE UP (ref 80–100)
MELD SCORE WITH DIALYSIS: 37 POINTS — SIGNIFICANT CHANGE UP
MELD SCORE WITHOUT DIALYSIS: 28 POINTS — SIGNIFICANT CHANGE UP
MITOCHONDRIA AB SER-ACNC: SIGNIFICANT CHANGE UP
NRBC # BLD: 0 /100 WBCS — SIGNIFICANT CHANGE UP (ref 0–0)
PLATELET # BLD AUTO: 251 K/UL — SIGNIFICANT CHANGE UP (ref 150–400)
POTASSIUM SERPL-MCNC: 4.3 MMOL/L — SIGNIFICANT CHANGE UP (ref 3.5–5.3)
POTASSIUM SERPL-SCNC: 4.3 MMOL/L — SIGNIFICANT CHANGE UP (ref 3.5–5.3)
PROT SERPL-MCNC: 7.6 G/DL — SIGNIFICANT CHANGE UP (ref 6–8.3)
PROTHROM AB SERPL-ACNC: 21.8 SEC — HIGH (ref 9.9–13.4)
RBC # BLD: 2.81 M/UL — LOW (ref 4.2–5.8)
RBC # FLD: 17.2 % — HIGH (ref 10.3–14.5)
SMOOTH MUSCLE AB SER-ACNC: SIGNIFICANT CHANGE UP
SODIUM SERPL-SCNC: 132 MMOL/L — LOW (ref 135–145)
SPECIMEN SOURCE: SIGNIFICANT CHANGE UP
WBC # BLD: 9.99 K/UL — SIGNIFICANT CHANGE UP (ref 3.8–10.5)
WBC # FLD AUTO: 9.99 K/UL — SIGNIFICANT CHANGE UP (ref 3.8–10.5)

## 2024-11-06 PROCEDURE — 99233 SBSQ HOSP IP/OBS HIGH 50: CPT

## 2024-11-06 RX ORDER — GLUCOSAMINE SULFATE DIPOT CHLR 500 MG
1 CAPSULE ORAL DAILY
Refills: 0 | Status: DISCONTINUED | OUTPATIENT
Start: 2024-11-06 | End: 2024-11-18

## 2024-11-06 RX ORDER — PANTOPRAZOLE SODIUM 40 MG/1
40 TABLET, DELAYED RELEASE ORAL
Refills: 0 | Status: DISCONTINUED | OUTPATIENT
Start: 2024-11-06 | End: 2024-11-18

## 2024-11-06 RX ORDER — MAGNESIUM, ALUMINUM HYDROXIDE 200-225/5
30 SUSPENSION, ORAL (FINAL DOSE FORM) ORAL EVERY 6 HOURS
Refills: 0 | Status: DISCONTINUED | OUTPATIENT
Start: 2024-11-06 | End: 2024-11-18

## 2024-11-06 RX ORDER — CYANOCOBALAMIN/FOLIC AC/VIT B6 1-2.2-25MG
1 TABLET ORAL DAILY
Refills: 0 | Status: DISCONTINUED | OUTPATIENT
Start: 2024-11-06 | End: 2024-11-18

## 2024-11-06 RX ORDER — LANOLIN ALCOHOL/MO/W.PET/CERES
100 CREAM (GRAM) TOPICAL DAILY
Refills: 0 | Status: DISCONTINUED | OUTPATIENT
Start: 2024-11-06 | End: 2024-11-18

## 2024-11-06 RX ADMIN — Medication 100 MILLIGRAM(S): at 13:44

## 2024-11-06 RX ADMIN — AMLODIPINE BESYLATE 10 MILLIGRAM(S): 10 TABLET ORAL at 05:26

## 2024-11-06 RX ADMIN — Medication 1 MILLIGRAM(S): at 13:15

## 2024-11-06 RX ADMIN — Medication 1 TABLET(S): at 13:44

## 2024-11-06 RX ADMIN — Medication 1: at 08:20

## 2024-11-06 RX ADMIN — Medication 30 MILLILITER(S): at 22:25

## 2024-11-06 RX ADMIN — Medication 2: at 11:40

## 2024-11-06 RX ADMIN — ENOXAPARIN SODIUM 40 MILLIGRAM(S): 30 INJECTION SUBCUTANEOUS at 18:26

## 2024-11-06 NOTE — PROGRESS NOTE ADULT - SUBJECTIVE AND OBJECTIVE BOX
CC: jaundice  S:  denies N/V, abd pain  O:  Vital Signs Last 24 Hrs  T(C): 37.4 (11-06-24 @ 05:15), Max: 37.4 (11-05-24 @ 13:35)  T(F): 99.4 (11-06-24 @ 05:15), Max: 99.4 (11-06-24 @ 05:15)  HR: 101 (11-06-24 @ 05:15) (98 - 101)  BP: 128/75 (11-06-24 @ 05:15) (117/77 - 128/75)  BP(mean): 88 (11-05-24 @ 20:28) (88 - 88)  RR: 17 (11-06-24 @ 05:15) (17 - 18)  SpO2: 97% (11-06-24 @ 05:15) (96% - 97%)      PE:  Gen: Oriented, alert, No acute distress Eyes: conjunctivae and lid normal, scleral icterus ENT: nose and throat exam normal CVS: S1, S2, RRR;  no murmur, no rubs, no gallops Pulm: Good air exchange, Breath sounds equal bilaterally, no rales rhonchi,  no wheezes Chest: nontender, no chest deformity, chest movement symmetrical Gl: abdomen soft, nontender, nondistended, bowel sounds normoactive, no masses palpated Musk: no msk pain, no joint swelling Skin: jaundice,  no skin lesions, skin turgor normal, warm and well perfused Neuro: Awake, alert,Psych: normal affect, insight

## 2024-11-06 NOTE — DISCHARGE NOTE PROVIDER - HOSPITAL COURSE
64 Y M H/O HTN, HLD, DM, alcohol use disorder, presents with new onset painless jaundice. CT and US showing new cirrhosis, varices, gallstones, and could not r/o cholecystitis. Admitted for further evaluation.  RUQ US: No intrahepatic bile duct dilatation visible; the CBD is not visualized due to the limitations of this exam; Cholelithiasis without any convincing evidence for cholecystitis this time. CTAP: Cholelithiasis with mild gallbladder wall edema and surrounding pericholecystic fluid/ascites in the setting of cirrhosis. Cholecystitis cannot be excluded  S/P MRCP showing no obstruction. Hepatology & GI consulted. GI recommending EGD ????    Patient was also noted with asymptomatic UTI. Started on Rocephin. Ucx neg, hence abx d/antonio.     ?????????????????????? INCOMPLETE    Please note that this a brief summary of hospital course please refer to daily progress notes and consult notes for full course and events. Patient seen and examined at bedside, discussed with medical attending. Patient medically cleared for discharge to.   64 Y M H/O HTN, HLD, DM, alcohol use disorder, presents with new onset painless jaundice. CT and US showing new cirrhosis, varices, gallstones, and could not r/o cholecystitis. Admitted for further evaluation.  RUQ US: No intrahepatic bile duct dilatation visible; the CBD is not visualized due to the limitations of this exam; Cholelithiasis without any convincing evidence for cholecystitis this time.   Hepatology & GI following.  CTAP: Cholelithiasis with mild gallbladder wall edema and surrounding pericholecystic fluid/ascites in the setting of cirrhosis. Cholecystitis cannot be excluded  MRCP: Enlarged, cirrhotic liver w/ portal hypertension (splenomegaly, varices, trace ascites), cholelithiasis and adenomyomatosis of GB, but no biliary obstruction.   He was started on prednisolone and NAC on 11/7 for suspected severe AH after infection was r/o.       Patient was also noted with asymptomatic UTI. Started on Rocephin. Ucx neg, hence abx d/antonio.     ?????????????????????? INCOMPLETE    Please note that this a brief summary of hospital course please refer to daily progress notes and consult notes for full course and events. Patient seen and examined at bedside, discussed with medical attending. Patient medically cleared for discharge to.   64 Y M H/O HTN, HLD, DM, alcohol use disorder, presents with new onset painless jaundice. CT and US showing new cirrhosis, varices, gallstones, and could not r/o cholecystitis. Admitted for further evaluation.  RUQ US: No intrahepatic bile duct dilatation visible; the CBD is not visualized due to the limitations of this exam; Cholelithiasis without any convincing evidence for cholecystitis this time.   Hepatology & GI following.  CTAP: Cholelithiasis with mild gallbladder wall edema and surrounding pericholecystic fluid/ascites in the setting of cirrhosis. Cholecystitis cannot be excluded  MRCP: Enlarged, cirrhotic liver w/ portal hypertension (splenomegaly, varices, trace ascites), cholelithiasis and adenomyomatosis of GB, but no biliary obstruction.   He was started on prednisolone and NAC on 11/7 for suspected severe AH after infection was r/o.     As per hepatology given high MELD, especially if will be non-responder to steroid, was discussed w/ [facility] transplant hepatology.  Pt. for transfer to [facility] for liver transplant evaluation/craig.    Patient was also noted with asymptomatic UTI. Treated with and completed a course of Rocephin.       Please note that this a brief summary of hospital course please refer to daily progress notes and consult notes for full course and events. Patient seen and examined at bedside, discussed with medical attending. Patient medically cleared for discharge to.    INCOMPLETE 64 Y M H/O HTN, HLD, DM, alcohol use disorder, presents with new onset painless jaundice. CT and US showing new cirrhosis, varices, gallstones, and could not r/o cholecystitis. Admitted for further evaluation.  RUQ US: No intrahepatic bile duct dilatation visible; the CBD is not visualized due to the limitations of this exam; Cholelithiasis without any convincing evidence for cholecystitis this time.   Hepatology & GI following.  CTAP: Cholelithiasis with mild gallbladder wall edema and surrounding pericholecystic fluid/ascites in the setting of cirrhosis. Cholecystitis cannot be excluded  MRCP: Enlarged, cirrhotic liver w/ portal hypertension (splenomegaly, varices, trace ascites), cholelithiasis and adenomyomatosis of GB, but no biliary obstruction.   He was started on prednisolone and NAC on 11/7 for suspected severe AH after infection was r/o.     As per hepatology given high MELD, especially if will be non-responder to steroid, was discussed w/ [facility] transplant hepatology.  Pt. for transfer to [facility] for liver transplant evaluation/craig.    Patient was also noted with asymptomatic UTI. Treated with and completed a course of Rocephin.       Please note that this a brief summary of hospital course please refer to daily progress notes and consult notes for full course and events. Patient seen and examined at bedside, discussed with medical attending. Patient medically cleared for discharge to.    INCOMPLETE/////11/13/24-  PLEASE GIVE PT COPIES OF LAB RESULT ON D/C. 64 Y M H/O HTN, HLD, DM, alcohol use disorder, presents with new onset painless jaundice. CT and US showing new cirrhosis, varices, gallstones, and could not r/o cholecystitis. Admitted for further evaluation.  RUQ US: No intrahepatic bile duct dilatation visible; the CBD is not visualized due to the limitations of this exam; Cholelithiasis without any convincing evidence for cholecystitis this time.   Hepatology & GI following.  CTAP: Cholelithiasis with mild gallbladder wall edema and surrounding pericholecystic fluid/ascites in the setting of cirrhosis. Cholecystitis cannot be excluded  MRCP: Enlarged, cirrhotic liver w/ portal hypertension (splenomegaly, varices, trace ascites), cholelithiasis and adenomyomatosis of GB, but no biliary obstruction.   He was started on prednisolone and NAC on 11/7 for suspected severe AH after infection was r/o.     As per hepatology given high MELD, especially if will be non-responder to steroid, was discussed w/ [facility] transplant hepatology.  Pt. was offered to be transfered to [facility] for liver transplant evaluation/craig, however declined    Patient was also noted with asymptomatic UTI. Treated with and completed a course of Rocephin.       Please note that this a brief summary of hospital course please refer to daily progress notes and consult notes for full course and events. Patient seen and examined at bedside, discussed with medical attending. Patient medically cleared for discharge to.     64 Y M H/O HTN, HLD, DM, alcohol use disorder, presents with new onset painless jaundice. CT and US showing new cirrhosis, varices, gallstones, and could not r/o cholecystitis. Admitted for further evaluation.  RUQ US: No intrahepatic bile duct dilatation visible; the CBD is not visualized due to the limitations of this exam; Cholelithiasis without any convincing evidence for cholecystitis this time.   Hepatology & GI following.  CTAP: Cholelithiasis with mild gallbladder wall edema and surrounding pericholecystic fluid/ascites in the setting of cirrhosis. Cholecystitis cannot be excluded  MRCP: Enlarged, cirrhotic liver w/ portal hypertension (splenomegaly, varices, trace ascites), cholelithiasis and adenomyomatosis of GB, but no biliary obstruction.   He was started on prednisolone and NAC on 11/7 for suspected severe AH after infection was r/o.     As per hepatology given high MELD, especially if will be non-responder to steroid, was discussed w/ [facility] transplant hepatology.  Pt. was offered to be transferred to [facility] for liver transplant evaluation/craig, however declined    Patient was also noted with asymptomatic UTI. Treated with and completed a course of Rocephin.       Please note that this a brief summary of hospital course please refer to daily progress notes and consult notes for full course and events. Patient seen and examined at bedside, discussed with medical attending. Patient medically cleared for discharge to.

## 2024-11-06 NOTE — PROGRESS NOTE ADULT - PROBLEM SELECTOR PLAN 1
Differential includes gall stone obstruction, alcohol hepatitis  - Maddrey score 57.8  - Painless jaundice x 3 days, with darker urine  - Drinks 1 pint vodka per day, last drink on 10/29  - RUQ US: No intrahepatic bile duct dilatation visible; the CBD is not visualized due to the limitations of this exam; Cholelithiasis without any convincing evidence for cholecystitis this time  - CTAP: Cholelithiasis with mild gallbladder wall edema and surrounding pericholecystic fluid/ascites in the setting of cirrhosis. Cholecystitis cannot be excluded  - T Bili 18, D Bili 14, , , ALT 45    -GI consulted  - Hep Dr. Hamm consulted   - MRCP neg for obstruction  -F/u AMA, smooth muscle Ab, hep panel, AFP  -F/u coags  -For possible alcohol hepatitis, holding off steroids as infection has not been ruled out (F/u Blood & UCXR, cultures)

## 2024-11-06 NOTE — DISCHARGE NOTE PROVIDER - CARE PROVIDER_API CALL
MORGAN LUJAN  [address]  [place] [address]  Phone: [phone]  Fax: [phone]  Established Patient  Follow Up Time: 1 week    Radha Gillis  Internal Medicine  [address] [place] [address]  Phone: [phone]  Fax: [phone]  Follow Up Time: 2 weeks    Marty Keene  Gastroenterology  [facility], [address]  [place] [address]  Phone: [phone]  Fax: [phone]  Follow Up Time: 2 weeks   MORGAN LUJAN  [address]  [place] [address]  Phone: [phone]  Fax: [phone]  Established Patient  Follow Up Time: 1 week    Radha Gillis  Internal Medicine  [address] [place] [address]  Phone: [phone]  Fax: [phone]  Follow Up Time: 2 weeks    Marty Keene  Gastroenterology  [address] [place] [address], [address]  [place] [address]  Phone: [phone]  Fax: [phone]  Follow Up Time: 2 weeks    Yvonne Cobb  Endocrinology/Metab/Diabetes  [address] [facility], [address]  [place] [address]  Phone: [phone]  Fax: [phone]  Follow Up Time: 1 week

## 2024-11-06 NOTE — DISCHARGE NOTE PROVIDER - PROVIDER TOKENS
PROVIDER:[TOKEN:[[id number]],FOLLOWUP:[1 week],ESTABLISHEDPATIENT:[T]],PROVIDER:[TOKEN:[[id number]],FOLLOWUP:[2 weeks]],PROVIDER:[TOKEN:[[id number]],FOLLOWUP:[2 weeks]] PROVIDER:[TOKEN:[[id number]],FOLLOWUP:[1 week],ESTABLISHEDPATIENT:[T]],PROVIDER:[TOKEN:[[id number]],FOLLOWUP:[2 weeks]],PROVIDER:[TOKEN:[[id number]],FOLLOWUP:[2 weeks]],PROVIDER:[TOKEN:[[id number]],FOLLOWUP:[1 week]]

## 2024-11-06 NOTE — PROGRESS NOTE ADULT - SUBJECTIVE AND OBJECTIVE BOX
[   ] ICU                                          [   ] CCU                                      [ X  ] Medical Floor    Patient is a 64 year old male with jaundice. GI consulted to evaluate.         64 year old male with past medical history significant for HTN, DM, ETOH abuse, Hyperlipidemia, presented to the emergency room with 3 days history of jaundice associated with non bloody vomiting and dark color urine. Patient reported drinking ETOH heavily over past 6 months (1 pint vodka daily). Patient denies abdominal pain, hematemesis, hematochezia, melena, fever, chills, chest pain, SOB, cough, hematuria, dysuria or diarrhea.     Patient appears comfortable. No new complaints reported, No abdominal pain, N/V, hematemesis, hematochezia, melena, fever, chills, chest pain, SOB, cough or diarrhea reported.      PAIN MANAGEMENT:  Pain Scale:                0 /10  Pain Location:         PAST MEDICAL HISTORY    HTN (hypertension)    Hyperlipidemia     Diabetes mellitus    ETOH abuse        PAST SURGICAL HISTORY    No significant surgical history reported        Allergies    No Known Allergies    Intolerances  None         SOCIAL HISTORY  Advanced Directives:       [ X ] Full Code       [  ] DNR  Marital Status:         [  ] M      [ X ] S      [  ] D       [  ] W  Children:       [ X ] Yes      [  ] No  Occupation:        [  ] Employed       [ X ] Unemployed       [  ] Retired  Diet:       [ X ] Regular       [  ] PEG feeding          [  ] NG tube feeding  Drug Use:           [ X ] Patient denied          [  ] Yes  Alcohol:           [  ] No             [  ] Yes (socially)         [ X ] Yes (chronic)  Tobacco:           [  ] Yes           [ X ] No      FAMILY HISTORY  [ X ] Heart Disease            [ X ] Diabetes             [ X ] HTN             [  ] Colon Cancer             [  ] Stomach Cancer              [  ] Pancreatic Cancer        VITALS   Vital Signs Last 24 Hrs  T(C): 37.4 (11-06-24 @ 05:15), Max: 37.4 (11-05-24 @ 13:35)  T(F): 99.4 (11-06-24 @ 05:15), Max: 99.4 (11-06-24 @ 05:15)  HR: 101 (11-06-24 @ 05:15) (98 - 101)  BP: 128/75 (11-06-24 @ 05:15) (117/77 - 128/75)  BP(mean): 88 (11-05-24 @ 20:28) (88 - 88)  RR: 17 (11-06-24 @ 05:15) (17 - 18)  SpO2: 97% (11-06-24 @ 05:15) (96% - 97%)    MEDICATIONS  (STANDING):  amLODIPine   Tablet 10 milliGRAM(s) Oral daily  cefTRIAXone   IVPB 1000 milliGRAM(s) IV Intermittent every 24 hours  enoxaparin Injectable 40 milliGRAM(s) SubCutaneous every 24 hours  influenza   Vaccine 0.5 milliLiter(s) IntraMuscular once  insulin lispro (ADMELOG) corrective regimen sliding scale   SubCutaneous three times a day before meals  insulin lispro (ADMELOG) corrective regimen sliding scale   SubCutaneous at bedtime    MEDICATIONS  (PRN):                            9.5    9.99  )-----------( 251      ( 06 Nov 2024 07:03 )             26.6       11-06    132[L]  |  101  |  15  ----------------------------<  148[H]  4.3   |  23  |  1.22    Ca    8.8      06 Nov 2024 07:03  Phos  2.8     11-05  Mg     1.8     11-05    TPro  7.6  /  Alb  2.1[L]  /  TBili  17.0[H]  /  DBili  x   /  AST  134[H]  /  ALT  45  /  AlkPhos  163[H]  11-06    PT/INR - ( 06 Nov 2024 07:03 )   PT: 21.8 sec;   INR: 1.87 ratio       PTT - ( 05 Nov 2024 06:50 )  PTT:36.0 sec    ACC: [id number] EXAM:  MR MRCP [facility] IC   ORDERED BY: MUSA POOL     PROCEDURE DATE:  11/05/2024          INTERPRETATION:  CLINICAL INFORMATION: Rule out biliary obstruction.   Jaundice.    COMPARISON: CT dated 11/02/2024.    CONTRAST/COMPLICATIONS:  IV Contrast: Gadavist  7.5 cc administered   0 cc discarded  Oral Contrast: NONE  Complications: None reported at time of study completion    PROCEDURE:  MRI of the abdomen was performed.  MRCP was performed.    FINDINGS:  LOWER CHEST: Trace atelectasis right lung base.    LIVER: Enlarged measuring approximately 24 cm. Nodular contour. Fatty   change in the periportal distribution. No evidence of any arterial phase   hyperenhancing mass to suggest HCC.  BILE DUCTS: Normal caliber.  GALLBLADDER: Combination of cholelithiasis and adenomyomatosis.  SPLEEN: Enlarged measuring approximately 14.5 cm.  PANCREAS: Within normal limits.  ADRENALS: Within normal limits.  KIDNEYS/URETERS: Within normal limits.    VISUALIZED PORTIONS:  BOWEL: Within normallimits.  PERITONEUM: Trace ascites and mesenteric edema.  VESSELS: Portal venous collaterals including lower esophageal varices.  RETROPERITONEUM/LYMPH NODES: No lymphadenopathy.  ABDOMINAL WALL: Within normal limits.  BONES: Within normal limits.    IMPRESSION:  Cirrhosis with splenomegaly and portal venous collaterals including the   lower esophageal varices. Trace ascites.  Periportal distribution of hepatic steatosis. No focal hepatic mass   lesion.  Cholelithiasis and adenomyomatosis.  No biliary obstruction.

## 2024-11-06 NOTE — PROGRESS NOTE ADULT - ASSESSMENT
64 Y M H/O HTN, HLD, DM, alcohol use disorder, presents with new onset painless jaundice. CT and US showing new cirrhosis, varices, gallstones, and could not r/o cholecystitis. Admitted for further evaluation.  S/P MRCP showing no obstruction. Hepatology & GI consulted.

## 2024-11-06 NOTE — DISCHARGE NOTE PROVIDER - NSDCMRMEDTOKEN_GEN_ALL_CORE_FT
amLODIPine 10 mg oral tablet: 1 tab(s) orally once a day  Mounjaro 7.5 mg/0.5 mL subcutaneous solution: 7.5 milligram(s) subcutaneously every 7 days  valsartan 320 mg oral tablet: 1 tab(s) orally once a day   amLODIPine 10 mg oral tablet: 1 tab(s) orally once a day  folic acid 1 mg oral tablet: 1 tab(s) orally once a day  Mounjaro 7.5 mg/0.5 mL subcutaneous solution: 7.5 milligram(s) subcutaneously every 7 days  Multiple Vitamins oral tablet: 1 tab(s) orally once a day  pantoprazole 40 mg oral delayed release tablet: 1 tab(s) orally once a day (before a meal)  thiamine 100 mg oral tablet: 1 tab(s) orally once a day  valsartan 320 mg oral tablet: 1 tab(s) orally once a day   amLODIPine 10 mg oral tablet: 1 tab(s) orally once a day  folic acid 1 mg oral tablet: 1 tab(s) orally once a day  Mounjaro 7.5 mg/0.5 mL subcutaneous solution: 7.5 milligram(s) subcutaneously every 7 days  Multiple Vitamins oral tablet: 1 tab(s) orally once a day  pantoprazole 40 mg oral delayed release tablet: 1 tab(s) orally once a day (before a meal)  prednisoLONE (as sodium phosphate) 15 mg/5 mL oral liquid: 10 milliliter(s) orally once a day  thiamine 100 mg oral tablet: 1 tab(s) orally once a day   amLODIPine 10 mg oral tablet: 1 tab(s) orally once a day  Basaglar KwikPen 100 units/mL subcutaneous solution: 15 unit(s) subcutaneous once a day (at bedtime)  folic acid 1 mg oral tablet: 1 tab(s) orally once a day  HumaLOG KwikPen 100 units/mL injectable solution: 10 subcutaneously 3 times a day (before meals) Give 12 Units if blood sugars are greater than 200  Give 14 Units if blood sugars are greater than 300  Multiple Vitamins oral tablet: 1 tab(s) orally once a day  pantoprazole 40 mg oral delayed release tablet: 1 tab(s) orally once a day (before a meal)  prednisoLONE (as sodium phosphate) 15 mg/5 mL oral liquid: 10 milliliter(s) orally once a day  thiamine 100 mg oral tablet: 1 tab(s) orally once a day   amLODIPine 10 mg oral tablet: 1 tab(s) orally once a day  folic acid 1 mg oral tablet: 1 tab(s) orally once a day  HumaLOG KwikPen 100 units/mL injectable solution: 10 subcutaneously 3 times a day (before meals) Give 12 Units if blood sugars are greater than 200  Give 14 Units if blood sugars are greater than 300  Multiple Vitamins oral tablet: 1 tab(s) orally once a day  pantoprazole 40 mg oral delayed release tablet: 1 tab(s) orally once a day (before a meal)  prednisoLONE (as sodium phosphate) 15 mg/5 mL oral liquid: 10 milliliter(s) orally once a day  Semglee (Prefilled Pen) 100 units/mL subcutaneous solution: 15 unit(s) subcutaneous once a day (at bedtime)  thiamine 100 mg oral tablet: 1 tab(s) orally once a day   amLODIPine 10 mg oral tablet: 1 tab(s) orally once a day  folic acid 1 mg oral tablet: 1 tab(s) orally once a day  HumaLOG KwikPen 100 units/mL injectable solution: 10 subcutaneously 3 times a day (before meals) Give 12 Units if blood sugars are greater than 200  Give 14 Units if blood sugars are greater than 300  Insulin Pen Needles, 4mm: 1 application subcutaneously 4 times a day. ** Use with insulin pen **  Multiple Vitamins oral tablet: 1 tab(s) orally once a day  pantoprazole 40 mg oral delayed release tablet: 1 tab(s) orally once a day (before a meal)  prednisoLONE (as sodium phosphate) 15 mg/5 mL oral liquid: 10 milliliter(s) orally once a day  Semglee (Prefilled Pen) 100 units/mL subcutaneous solution: 15 unit(s) subcutaneous once a day (at bedtime)  thiamine 100 mg oral tablet: 1 tab(s) orally once a day

## 2024-11-06 NOTE — DISCHARGE NOTE PROVIDER - TIME SPENT: (MINUTES SPENT ON THE DISCHARGE SERVICE)
S/P CABG : Doing great
NPO after midnight  Abx on call to OR  carotid doppler/ PFT/ echo pending/   ABG/ CT chest noncontrast  C/W BBlocker, statin, aspirin  Hold ace  Initiate PPI, DVT prophylaxis  Anticipate CABG with Dr Campbell in am
35

## 2024-11-06 NOTE — PROGRESS NOTE ADULT - ASSESSMENT
1. Painless jaundice  2. Transaminitis  3. Alcoholic cirrhosis  4. Portal HTN  5. Paraesophageal/perigastric varices  6. Cholelithiasis with gallbladder wall edema and pericholecystic fluid  7. Cholecystitis less likely  8. Ascites  9. ETOH abuse  10. Anemia  11. No evidence of acute GI bleeding  12. S/p MRCP  13. No extrahepatic biliary obstruction    Suggestions:    1. Follow up LFT's  2. Continue antibiotics   3. Consider Steroid therapy as per hepatology  4. B-blocker therapy  5. Lactulose daily  6. Protonix 40mg daily  7. DT's precaution  8. Hepatology follow up  9. Monitor H/H  10. Transfuse PRBC as needed  11. EGD  12. Check AFP  13. Diagnostic and therapeutic paracentesis  14. Thiamine / Folate / MVI  15. DVT prophylaxis

## 2024-11-06 NOTE — DISCHARGE NOTE PROVIDER - ATTENDING DISCHARGE PHYSICAL EXAMINATION:
Vital Signs Last 24 Hrs  T(C): 36.8 (18 Nov 2024 13:35), Max: 37.1 (18 Nov 2024 05:08)  T(F): 98.2 (18 Nov 2024 13:35), Max: 98.7 (18 Nov 2024 05:08)  HR: 98 (18 Nov 2024 13:35) (93 - 101)  BP: 118/73 (18 Nov 2024 13:35) (118/73 - 144/78)  BP(mean): 87 (18 Nov 2024 05:08) (87 - 100)  RR: 18 (18 Nov 2024 13:35) (18 - 18)  SpO2: 98% (18 Nov 2024 13:35) (97% - 98%)    Parameters below as of 18 Nov 2024 13:35  Patient On (Oxygen Delivery Method): room air    PHYSICAL EXAM:  GENERAL: NAD  HEENT: Normocephalic;  conjunctivae and sclerae icteric ; moist mucous membranes;   NECK : supple  CHEST/LUNG: Clear to auscultation bilaterally with good air entry   HEART: S1 S2  regular; no murmurs, gallops or rubs  ABDOMEN: Soft, Nontender, Nondistended; Bowel sounds present  EXTREMITIES: no cyanosis; no edema; no calf tenderness  SKIN: warm and dry; no rash  NERVOUS SYSTEM:  Awake and alert; Oriented  to place, person and time ; no new deficits

## 2024-11-06 NOTE — PROGRESS NOTE ADULT - SUBJECTIVE AND OBJECTIVE BOX
NP Note discussed with  primary attending    Patient is a 64y old  Male who presents with a chief complaint of painless jaundice (06 Nov 2024 11:58)      INTERVAL HPI/OVERNIGHT EVENTS: no new complaints    MEDICATIONS  (STANDING):  amLODIPine   Tablet 10 milliGRAM(s) Oral daily  enoxaparin Injectable 40 milliGRAM(s) SubCutaneous every 24 hours  folic acid 1 milliGRAM(s) Oral daily  influenza   Vaccine 0.5 milliLiter(s) IntraMuscular once  insulin lispro (ADMELOG) corrective regimen sliding scale   SubCutaneous three times a day before meals  insulin lispro (ADMELOG) corrective regimen sliding scale   SubCutaneous at bedtime  multivitamin 1 Tablet(s) Oral daily  pantoprazole    Tablet 40 milliGRAM(s) Oral before breakfast  thiamine 100 milliGRAM(s) Oral daily    MEDICATIONS  (PRN):      __________________________________________________  REVIEW OF SYSTEMS:    CONSTITUTIONAL: No fever,   EYES: no acute visual disturbances  NECK: No pain or stiffness  RESPIRATORY: No cough; No shortness of breath  CARDIOVASCULAR: No chest pain, no palpitations  GASTROINTESTINAL: No pain. No nausea or vomiting; No diarrhea   NEUROLOGICAL: No headache or numbness, no tremors  MUSCULOSKELETAL: No joint pain, no muscle pain  GENITOURINARY: no dysuria, no frequency, no hesitancy  PSYCHIATRY: no depression , no anxiety  ALL OTHER  ROS negative        Vital Signs Last 24 Hrs  T(C): 37.4 (06 Nov 2024 05:15), Max: 37.4 (05 Nov 2024 13:35)  T(F): 99.4 (06 Nov 2024 05:15), Max: 99.4 (06 Nov 2024 05:15)  HR: 101 (06 Nov 2024 05:15) (98 - 101)  BP: 128/75 (06 Nov 2024 05:15) (117/77 - 128/75)  BP(mean): 88 (05 Nov 2024 20:28) (88 - 88)  RR: 17 (06 Nov 2024 05:15) (17 - 18)  SpO2: 97% (06 Nov 2024 05:15) (96% - 97%)    Parameters below as of 06 Nov 2024 05:15  Patient On (Oxygen Delivery Method): room air        ________________________________________________  PHYSICAL EXAM:  GENERAL: NAD  HEENT: Normocephalic;  conjunctivae and sclerae clear; moist mucous membranes;   NECK : supple  CHEST/LUNG: Clear to ausculitation bilaterally with good air entry   HEART: S1 S2  regular; no murmurs, gallops or rubs  ABDOMEN: Soft, Nontender, Nondistended; Bowel sounds present  EXTREMITIES: no cyanosis; no edema; no calf tenderness  SKIN: warm and dry; no rash  NERVOUS SYSTEM:  Awake and alert; Oriented  to place, person and time ; no new deficits    _________________________________________________  LABS:                        9.5    9.99  )-----------( 251      ( 06 Nov 2024 07:03 )             26.6     11-06    132[L]  |  101  |  15  ----------------------------<  148[H]  4.3   |  23  |  1.22    Ca    8.8      06 Nov 2024 07:03  Phos  2.8     11-05  Mg     1.8     11-05    TPro  7.6  /  Alb  2.1[L]  /  TBili  17.0[H]  /  DBili  x   /  AST  134[H]  /  ALT  45  /  AlkPhos  163[H]  11-06    PT/INR - ( 06 Nov 2024 07:03 )   PT: 21.8 sec;   INR: 1.87 ratio         PTT - ( 05 Nov 2024 06:50 )  PTT:36.0 sec  Urinalysis Basic - ( 06 Nov 2024 07:03 )    Color: x / Appearance: x / SG: x / pH: x  Gluc: 148 mg/dL / Ketone: x  / Bili: x / Urobili: x   Blood: x / Protein: x / Nitrite: x   Leuk Esterase: x / RBC: x / WBC x   Sq Epi: x / Non Sq Epi: x / Bacteria: x      CAPILLARY BLOOD GLUCOSE      POCT Blood Glucose.: 218 mg/dL (06 Nov 2024 11:36)  POCT Blood Glucose.: 198 mg/dL (06 Nov 2024 08:15)  POCT Blood Glucose.: 150 mg/dL (05 Nov 2024 21:14)  POCT Blood Glucose.: 165 mg/dL (05 Nov 2024 16:42)        RADIOLOGY & ADDITIONAL TESTS:  < from: MR MRCP w/wo IV Cont (11.05.24 @ 14:28) >  IMPRESSION:  Cirrhosis with splenomegaly and portal venous collaterals including the   lower esophageal varices. Trace ascites.  Periportal distribution of hepatic steatosis. No focal hepatic mass   lesion.  Cholelithiasis and adenomyomatosis.  No biliary obstruction.      < end of copied text >    Imaging Personally Reviewed:  YES    Consultant(s) Notes Reviewed:   YES    Care Discussed with Consultants :     Plan of care was discussed with patient and /or primary care giver; all questions and concerns were addressed and care was aligned with patient's wishes.

## 2024-11-06 NOTE — PROGRESS NOTE ADULT - PROBLEM SELECTOR PLAN 5
Drinks 1 pint vodka since 5/2024  Last drink on 10/29  No s/s etoh withdrawal  Monitor for [facility]  Social work consulted

## 2024-11-06 NOTE — DISCHARGE NOTE PROVIDER - NSDCFUADDAPPT_GEN_ALL_CORE_FT
APPTS ARE READY TO BE MADE: [x ] YES    Best Family or Patient Contact (if needed):    Additional Information about above appointments (if needed):    1: Dr. Gillis- within one week- post hospital f/u   2: PCP Dr. Sibley - one week   3:     Other comments or requests:    APPTS ARE READY TO BE MADE: [x ] YES    Best Family or Patient Contact (if needed):    Additional Information about above appointments (if needed):    1: Dr. Gillis- within one week- post hospital f/u   2: PCP Dr. Sibley - one week   3:     Other comments or requests:     Patient informed us they already have secured a follow up appointment for [facility] with Dr. Sibley on 11/20/2024, [address] [place] [address], which is not visible on [facility].     Appointment was scheduled by our team on the patient's behalf through the provider's office for Gastroenterology with Dr. Keene on 11/29/2024 at 9:30am, [address] [facility], [address] [place] [address].     A [facility] office was contacted to secure an appointment for Hepatology with Dr. Hamm, however the office will follow up with the patient/caregiver directly.     Appointment was scheduled in [facility] for Endocrinology with Dr. Cobb for 4/10/2025 at 10:20am, [address] [facility]  [id number].

## 2024-11-06 NOTE — PROGRESS NOTE ADULT - PROBLEM SELECTOR PLAN 7
Na 129 on admission likely iso dehydration due to etoh abuse  Pedal edema chronic, secondary to amlodipine  - urine studies noted  - Monitor CMP

## 2024-11-06 NOTE — DISCHARGE NOTE PROVIDER - CARE PROVIDERS DIRECT ADDRESSES
,[email or web address]

## 2024-11-06 NOTE — DISCHARGE NOTE PROVIDER - NSDCCPCAREPLAN_GEN_ALL_CORE_FT
PRINCIPAL DISCHARGE DIAGNOSIS  Diagnosis: Painless jaundice  Assessment and Plan of Treatment: You presented with yellowish discolration of your skin (Jaundice) raising suspicion for liver/gall bladder disease. Cat scan of your abdomen & pelvis shows Cholelithiasis  (Gallstones) with mild gallbladder wall edema and surrounding pericholecystic fluid/ascites in the setting of cirrhosis. Cholecystitis cannot be excluded. You were evaluated by  stomach & liver team. You underwent a special text called MRCP showing no obstruction.  You are recommended the following by the hepatology: ??????????  You are recommended by the GI team with the following: ???????????        SECONDARY DISCHARGE DIAGNOSES  Diagnosis: Gall stones  Assessment and Plan of Treatment: Your cat scan of abdomen & pelvis shows gallstones with mild gallbladder edema.   Same plan as above    Diagnosis: Esophageal varices in cirrhosis  Assessment and Plan of Treatment: Your CT and US showing new cirrhosis, varices, gallstones, and could not r/o cholecystitis. You were evaluated by GI team who recommends   EGD ???????????    Diagnosis: HTN (hypertension)  Assessment and Plan of Treatment: You havea history of high blood pressure. Continue to take your medications as prescribed. Follow up with your primary doctor after discharge.  Mainatian a Low salt diet  Engage in activity as tolerated.  Take all medication as prescribed.  Follow up with your medical doctor for routine blood pressure monitoring at your next visit.  Notify your doctor if you have any of the following symptoms:   Dizziness, Lightheadedness, Blurry vision, Headache, Chest pain, Shortness of breath      Diagnosis: Alcohol use disorder  Assessment and Plan of Treatment: Follow up with your doctor within 1 week. It is important that you drink alcohol only in moderation. Results of excessive drinking include, butare not limited to, intestinal bleeding, liver failure, and death. Stay hydrated and do not take NSAIDs or drive when drinking. "[address]" ([phone]  is a number you can call to find alcohol treatment options.       PRINCIPAL DISCHARGE DIAGNOSIS  Diagnosis: Painless jaundice  Assessment and Plan of Treatment: You presented with yellowish discolration of your skin (Jaundice) raising suspicion for liver/gall bladder disease. Cat scan of your abdomen & pelvis shows Cholelithiasis  (Gallstones) with mild gallbladder wall edema and surrounding pericholecystic fluid/ascites in the setting of cirrhosis. Cholecystitis cannot be excluded. You were evaluated by  stomach & liver team. You underwent a special text called MRCP showing no obstruction.  You are recommended to be transferred to [facility] for evaluation/work up for possible liver transplant as your improvement on current treatment is inadequate.  You are recommended by the GI team with the following: ???????????        SECONDARY DISCHARGE DIAGNOSES  Diagnosis: Gall stones  Assessment and Plan of Treatment: Your cat scan of abdomen & pelvis shows gallstones with mild gallbladder edema.   Same plan as above    Diagnosis: Esophageal varices in cirrhosis  Assessment and Plan of Treatment: Your CT and US showing new cirrhosis, varices, gallstones, and could not r/o cholecystitis. You were evaluated by GI team who recommends   EGD ???????????    Diagnosis: HTN (hypertension)  Assessment and Plan of Treatment: You havea history of high blood pressure. Continue to take your medications as prescribed. Follow up with your primary doctor after discharge.  Mainatian a Low salt diet  Engage in activity as tolerated.  Take all medication as prescribed.  Follow up with your medical doctor for routine blood pressure monitoring at your next visit.  Notify your doctor if you have any of the following symptoms:   Dizziness, Lightheadedness, Blurry vision, Headache, Chest pain, Shortness of breath      Diagnosis: Alcohol use disorder  Assessment and Plan of Treatment: Follow up with your doctor within 1 week. It is important that you drink alcohol only in moderation. Results of excessive drinking include, butare not limited to, intestinal bleeding, liver failure, and death. Stay hydrated and do not take NSAIDs or drive when drinking. "[address]" ([phone]  is a number you can call to find alcohol treatment options.       PRINCIPAL DISCHARGE DIAGNOSIS  Diagnosis: Liver cirrhosis  Assessment and Plan of Treatment: You presented with yellowish discoloration of your skin known as jaundice, likely in setting of alcoholic hepatised superimposed cirrhosis. Your cat scan showed new cirrhosis, gall stones, varices. You underwent further testing with MRCP which was negative for obstruction. You were evaluated by the hepatology & GI team.  As per hepatology, given high MELD, especially if will be non-responder to steroid, was discussed w/ [facility] transplant hepatology.  You were offered to be transfered to [facility] for liver transplant evaluation/craig, however you declined.  Follow up with your primary doctor within 1 week of discharge.      SECONDARY DISCHARGE DIAGNOSES  Diagnosis: Painless jaundice  Assessment and Plan of Treatment: Same plan as above      Diagnosis: Gall stones  Assessment and Plan of Treatment: Your cat scan of abdomen & pelvis shows gallstones with mild gallbladder edema.   Same plan as above    Diagnosis: Esophageal varices in cirrhosis  Assessment and Plan of Treatment: Your CT and US showing new cirrhosis, varices, gallstones, and could not r/o cholecystitis. You were evaluated by GI team who recommends   EGD in out/patient setting    Diagnosis: HTN (hypertension)  Assessment and Plan of Treatment: You havea history of high blood pressure. Continue to take your medications as prescribed. Follow up with your primary doctor after discharge.  Mainatian a Low salt diet  Engage in activity as tolerated.  Take all medication as prescribed.  Follow up with your medical doctor for routine blood pressure monitoring at your next visit.  Notify your doctor if you have any of the following symptoms:   Dizziness, Lightheadedness, Blurry vision, Headache, Chest pain, Shortness of breath      Diagnosis: Alcohol use disorder  Assessment and Plan of Treatment: Follow up with your doctor within 1 week. It is important that you drink alcohol only in moderation. Results of excessive drinking include, butare not limited to, intestinal bleeding, liver failure, and death. Stay hydrated and do not take NSAIDs or drive when drinking. "[address]" ([phone]  is a number you can call to find alcohol treatment options.       PRINCIPAL DISCHARGE DIAGNOSIS  Diagnosis: Liver cirrhosis  Assessment and Plan of Treatment: You presented with yellowish discoloration of your skin known as jaundice, likely in setting of alcoholic hepatised superimposed cirrhosis. Your cat scan showed new cirrhosis, gall stones, varices. You underwent further testing with MRCP which was negative for obstruction. You were evaluated by the hepatology & GI team.  As per hepatology, given high MELD, especially if will be non-responder to steroid, was discussed w/ [facility] transplant hepatology.  You were offered to be transfered to [facility] for liver transplant evaluation/craig, however you declined.  Follow up with your primary doctor within 1 week of discharge.      SECONDARY DISCHARGE DIAGNOSES  Diagnosis: Painless jaundice  Assessment and Plan of Treatment: Same plan as above      Diagnosis: Diabetes mellitus  Assessment and Plan of Treatment: You were evaluated by an endocrine doctor for the management of diabetes.  Endocrine recommendations:  - You are advised to stop taking Mounjaro due to your liver disease.   - Start Lantus 15 units daily at night  - Start Lispro 10 units before each meal.   - Take Lispro 12 units before each meal if your blood sugars are greater than 200  - Take Lispro 14 units before each meal if your blood sugars are greater than 300  - follow up with endocrine doctor after discharge in out/patient clinic    Diagnosis: Gall stones  Assessment and Plan of Treatment: Your cat scan of abdomen & pelvis shows gallstones with mild gallbladder edema.   Same plan as above    Diagnosis: Esophageal varices in cirrhosis  Assessment and Plan of Treatment: Your CT and US showing new cirrhosis, varices, gallstones, and could not r/o cholecystitis. You were evaluated by GI team who recommends   EGD in out/patient setting    Diagnosis: HTN (hypertension)  Assessment and Plan of Treatment: You havea history of high blood pressure. Continue to take your medications as prescribed. Follow up with your primary doctor after discharge.  Mainatian a Low salt diet  Engage in activity as tolerated.  Take all medication as prescribed.  Follow up with your medical doctor for routine blood pressure monitoring at your next visit.  Notify your doctor if you have any of the following symptoms:   Dizziness, Lightheadedness, Blurry vision, Headache, Chest pain, Shortness of breath      Diagnosis: Alcohol use disorder  Assessment and Plan of Treatment: Follow up with your doctor within 1 week. It is important that you drink alcohol only in moderation. Results of excessive drinking include, butare not limited to, intestinal bleeding, liver failure, and death. Stay hydrated and do not take NSAIDs or drive when drinking. "[address]" ([phone]  is a number you can call to find alcohol treatment options.       PRINCIPAL DISCHARGE DIAGNOSIS  Diagnosis: Alcoholic hepatitis  Assessment and Plan of Treatment: You presented with yellowish discoloration of your skin known as jaundice, likely in setting of alcoholic hepatised superimposed cirrhosis. Your cat scan showed new cirrhosis, gall stones, varices. You underwent further testing with MRCP which was negative for obstruction. You were evaluated by the hepatology & GI team.  As per hepatology, given high MELD, you were offered to be transfered to [facility] for liver transplant evaluation/craig, however you declined.  You prefer to c/w steroids for now, please c/w prednsiosole as described.   -Quit alcohol. Counsellimg provided.   Follow up with your primary doctor within 1 week of discharge.      SECONDARY DISCHARGE DIAGNOSES  Diagnosis: Liver cirrhosis  Assessment and Plan of Treatment: You were found to have liver cirrhosis, please follow up with hepatologist as out-patient  You will need EGD to screen for varices.    Diagnosis: Diabetes mellitus  Assessment and Plan of Treatment: You were evaluated by an endocrine doctor for the management of diabetes.  Given the fact that you are on steroids, blood sugars get uncontrolled.   Endocrine recommendations:  - You are advised to stop taking Mounjaro due to your liver disease.   - Start Lantus 15 units daily at night  - Start Lispro 10 units before each meal.   - Take Lispro 12 units before each meal if your blood sugars are greater than 200  - Take Lispro 14 units before each meal if your blood sugars are greater than 300  - follow up with endocrine doctor after discharge in out/patient clinic once steroids are held for further managemnet.    Diagnosis: Gall stones  Assessment and Plan of Treatment: Your cat scan of abdomen & pelvis shows gallstones with mild gallbladder edema.   Same plan as above    Diagnosis: Esophageal varices in cirrhosis  Assessment and Plan of Treatment: Your CT and US showing new cirrhosis, varices, gallstones, and could not r/o cholecystitis. You were evaluated by GI team who recommends   EGD in out/patient setting    Diagnosis: HTN (hypertension)  Assessment and Plan of Treatment: You havea history of high blood pressure. Continue to take your medications as prescribed. Follow up with your primary doctor after discharge.  Mainatian a Low salt diet  Engage in activity as tolerated.  Take all medication as prescribed.  Follow up with your medical doctor for routine blood pressure monitoring at your next visit.  Notify your doctor if you have any of the following symptoms:   Dizziness, Lightheadedness, Blurry vision, Headache, Chest pain, Shortness of breath      Diagnosis: Alcohol use disorder  Assessment and Plan of Treatment: Follow up with your doctor within 1 week. It is important that you drink alcohol only in moderation. Results of excessive drinking include, butare not limited to, intestinal bleeding, liver failure, and death. Stay hydrated and do not take NSAIDs or drive when drinking. "[address]" ([phone]  is a number you can call to find alcohol treatment options.      Diagnosis: Painless jaundice  Assessment and Plan of Treatment: Same plan as above       PRINCIPAL DISCHARGE DIAGNOSIS  Diagnosis: Alcoholic hepatitis  Assessment and Plan of Treatment: You presented with yellowish discoloration of your skin known as jaundice, likely in setting of alcoholic hepatised superimposed cirrhosis. Your cat scan showed new cirrhosis, gall stones, varices. You underwent further testing with MRCP which was negative for obstruction. You were evaluated by the hepatology & GI team.  As per hepatology, given high MELD, you were offered to be transfered to [facility] for liver transplant evaluation/craig, however you declined.  You prefer to c/w steroids for now, please c/w prednsiosole as described.   -Quit alcohol. Counsellimg provided.   -F/u w/ Dr. Gillis in one week time. Need repeat labs- LFTS to monitor bilirubin.   Follow up with your primary doctor within 1 week of discharge.      SECONDARY DISCHARGE DIAGNOSES  Diagnosis: Liver cirrhosis  Assessment and Plan of Treatment: You were found to have liver cirrhosis, please follow up with hepatologist as out-patient  You will need EGD to screen for varices.    Diagnosis: Diabetes mellitus  Assessment and Plan of Treatment: You were evaluated by an endocrine doctor for the management of diabetes.  Given the fact that you are on steroids, blood sugars get uncontrolled.   Endocrine recommendations:  - You are advised to stop taking Mounjaro due to your liver disease.   - Start Lantus 15 units daily at night  - Start Lispro 10 units before each meal.   - Take Lispro 12 units before each meal if your blood sugars are greater than 200  - Take Lispro 14 units before each meal if your blood sugars are greater than 300  - follow up with endocrine doctor after discharge in out/patient clinic once steroids are held for further managemnet.    Diagnosis: Gall stones  Assessment and Plan of Treatment: Your cat scan of abdomen & pelvis shows gallstones with mild gallbladder edema.   Same plan as above    Diagnosis: Esophageal varices in cirrhosis  Assessment and Plan of Treatment: Your CT and US showing new cirrhosis, varices, gallstones, and could not r/o cholecystitis. You were evaluated by GI team who recommends   EGD in out/patient setting    Diagnosis: HTN (hypertension)  Assessment and Plan of Treatment: You havea history of high blood pressure. Continue to take your medications as prescribed. Follow up with your primary doctor after discharge.  Mainatian a Low salt diet  Engage in activity as tolerated.  Take all medication as prescribed.  Follow up with your medical doctor for routine blood pressure monitoring at your next visit.  Notify your doctor if you have any of the following symptoms:   Dizziness, Lightheadedness, Blurry vision, Headache, Chest pain, Shortness of breath      Diagnosis: Alcohol use disorder  Assessment and Plan of Treatment: Follow up with your doctor within 1 week. It is important that you drink alcohol only in moderation. Results of excessive drinking include, butare not limited to, intestinal bleeding, liver failure, and death. Stay hydrated and do not take NSAIDs or drive when drinking. "[address]" ([phone]  is a number you can call to find alcohol treatment options.      Diagnosis: Painless jaundice  Assessment and Plan of Treatment: Same plan as above

## 2024-11-07 DIAGNOSIS — K74.60 UNSPECIFIED CIRRHOSIS OF LIVER: ICD-10-CM

## 2024-11-07 LAB
ALBUMIN SERPL ELPH-MCNC: 2.1 G/DL — LOW (ref 3.5–5)
ALP SERPL-CCNC: 150 U/L — HIGH (ref 40–120)
ALT FLD-CCNC: 41 U/L DA — SIGNIFICANT CHANGE UP (ref 10–60)
AMYLASE P1 CFR SERPL: 157 U/L — HIGH (ref 25–115)
ANION GAP SERPL CALC-SCNC: 8 MMOL/L — SIGNIFICANT CHANGE UP (ref 5–17)
AST SERPL-CCNC: 116 U/L — HIGH (ref 10–40)
BILIRUB SERPL-MCNC: 17.2 MG/DL — HIGH (ref 0.2–1.2)
BUN SERPL-MCNC: 16 MG/DL — SIGNIFICANT CHANGE UP (ref 7–18)
CALCIUM SERPL-MCNC: 8.6 MG/DL — SIGNIFICANT CHANGE UP (ref 8.4–10.5)
CHLORIDE SERPL-SCNC: 102 MMOL/L — SIGNIFICANT CHANGE UP (ref 96–108)
CO2 SERPL-SCNC: 23 MMOL/L — SIGNIFICANT CHANGE UP (ref 22–31)
CREAT SERPL-MCNC: 1.04 MG/DL — SIGNIFICANT CHANGE UP (ref 0.5–1.3)
CULTURE RESULTS: SIGNIFICANT CHANGE UP
CULTURE RESULTS: SIGNIFICANT CHANGE UP
EGFR: 81 ML/MIN/1.73M2 — SIGNIFICANT CHANGE UP
GLUCOSE BLDC GLUCOMTR-MCNC: 154 MG/DL — HIGH (ref 70–99)
GLUCOSE BLDC GLUCOMTR-MCNC: 161 MG/DL — HIGH (ref 70–99)
GLUCOSE BLDC GLUCOMTR-MCNC: 206 MG/DL — HIGH (ref 70–99)
GLUCOSE BLDC GLUCOMTR-MCNC: 258 MG/DL — HIGH (ref 70–99)
GLUCOSE SERPL-MCNC: 130 MG/DL — HIGH (ref 70–99)
HCT VFR BLD CALC: 25 % — LOW (ref 39–50)
HGB BLD-MCNC: 8.7 G/DL — LOW (ref 13–17)
LEPTOSPIRA AB TITR SER: NEGATIVE — SIGNIFICANT CHANGE UP
LIDOCAIN IGE QN: 201 U/L — HIGH (ref 13–75)
MCHC RBC-ENTMCNC: 32.6 PG — SIGNIFICANT CHANGE UP (ref 27–34)
MCHC RBC-ENTMCNC: 34.8 G/DL — SIGNIFICANT CHANGE UP (ref 32–36)
MCV RBC AUTO: 93.6 FL — SIGNIFICANT CHANGE UP (ref 80–100)
NRBC # BLD: 0 /100 WBCS — SIGNIFICANT CHANGE UP (ref 0–0)
PLATELET # BLD AUTO: 245 K/UL — SIGNIFICANT CHANGE UP (ref 150–400)
POTASSIUM SERPL-MCNC: 3.7 MMOL/L — SIGNIFICANT CHANGE UP (ref 3.5–5.3)
POTASSIUM SERPL-SCNC: 3.7 MMOL/L — SIGNIFICANT CHANGE UP (ref 3.5–5.3)
PROT SERPL-MCNC: 7.1 G/DL — SIGNIFICANT CHANGE UP (ref 6–8.3)
RBC # BLD: 2.67 M/UL — LOW (ref 4.2–5.8)
RBC # FLD: 17.1 % — HIGH (ref 10.3–14.5)
SODIUM SERPL-SCNC: 133 MMOL/L — LOW (ref 135–145)
SPECIMEN SOURCE: SIGNIFICANT CHANGE UP
SPECIMEN SOURCE: SIGNIFICANT CHANGE UP
WBC # BLD: 9.64 K/UL — SIGNIFICANT CHANGE UP (ref 3.8–10.5)
WBC # FLD AUTO: 9.64 K/UL — SIGNIFICANT CHANGE UP (ref 3.8–10.5)

## 2024-11-07 PROCEDURE — 99233 SBSQ HOSP IP/OBS HIGH 50: CPT

## 2024-11-07 RX ORDER — ACETYLCYSTEINE
15 POWDER (GRAM) MISCELLANEOUS EVERY 24 HOURS
Refills: 0 | Status: COMPLETED | OUTPATIENT
Start: 2024-11-07 | End: 2024-11-11

## 2024-11-07 RX ORDER — ACETYLCYSTEINE
15 POWDER (GRAM) MISCELLANEOUS ONCE
Refills: 0 | Status: COMPLETED | OUTPATIENT
Start: 2024-11-07 | End: 2024-11-07

## 2024-11-07 RX ORDER — ACETYLCYSTEINE
5 POWDER (GRAM) MISCELLANEOUS ONCE
Refills: 0 | Status: COMPLETED | OUTPATIENT
Start: 2024-11-07 | End: 2024-11-07

## 2024-11-07 RX ORDER — PREDNISONE 20 MG/1
40 TABLET ORAL DAILY
Refills: 0 | Status: DISCONTINUED | OUTPATIENT
Start: 2024-11-07 | End: 2024-11-08

## 2024-11-07 RX ADMIN — Medication 131.25 GRAM(S): at 18:33

## 2024-11-07 RX ADMIN — Medication 1: at 07:55

## 2024-11-07 RX ADMIN — Medication 100 MILLIGRAM(S): at 11:49

## 2024-11-07 RX ADMIN — PANTOPRAZOLE SODIUM 40 MILLIGRAM(S): 40 TABLET, DELAYED RELEASE ORAL at 05:22

## 2024-11-07 RX ADMIN — Medication 2: at 11:50

## 2024-11-07 RX ADMIN — Medication 1 TABLET(S): at 11:49

## 2024-11-07 RX ADMIN — ENOXAPARIN SODIUM 40 MILLIGRAM(S): 30 INJECTION SUBCUTANEOUS at 18:33

## 2024-11-07 RX ADMIN — Medication 44.79 GRAM(S): at 22:54

## 2024-11-07 RX ADMIN — Medication 325 GRAM(S): at 17:06

## 2024-11-07 RX ADMIN — AMLODIPINE BESYLATE 10 MILLIGRAM(S): 10 TABLET ORAL at 05:22

## 2024-11-07 RX ADMIN — Medication 1: at 17:06

## 2024-11-07 RX ADMIN — PREDNISONE 40 MILLIGRAM(S): 20 TABLET ORAL at 17:07

## 2024-11-07 RX ADMIN — Medication 1 MILLIGRAM(S): at 11:50

## 2024-11-07 RX ADMIN — Medication 1: at 21:51

## 2024-11-07 NOTE — PROGRESS NOTE ADULT - ASSESSMENT
65 yo overweight (BMI 29) Male originally from [facility] w/ Hx of  HTN, HLD, DM, alcohol use disorder (socially for years, heavily since 5/2024 since his [facility], 1 pint of Vodka per day, w/o Hx of withdrawal or alcohol related hospitalization), presented to [facility] with new onset painless jaundice initially on 11/2/24, subsequently signed out AMA, and returned to [facility] on 11/4/24. He noted the jaundice for short time, but has been noticing darkening of his urine since 8/2024 gradually.   CT a/p w/ iv 11/2/24 showed enlarged liver (24 cm) w/ cirrhotic morphology, markedly heterogenous liver parenchyma, signs of portal hypertension (varices) cholelithiasis w/ mild GB wall thickening (reported that cholecystitis could not be excluded), and aortic aneurysm.   Subsequent MR/MRCP w/wo 11/5/24 showed enlarged, cirrhotic liver w/ portal hypertension (splenomegaly, varices, trace ascites), cholelithiasis and adenomyomatosis of GB, but no biliary obstruction.       Jaundice  Hepatomegaly  Cirrhosis   Portal hypertension  Suspected alcoholic hepatitis (AH) superimposed to cirrhosis (MetALD)  Trace ascites  TODD - resolved    - MRCP did not show obstruction  - No portal  or hepatic vein thrombosis reported  - Acute viral hepatitis panel neg  - CXR neg, Ascites is trace, unlikely to be sufficient for Dx paracentesis  - Noted pos UA, on ceftriaxone till 5/8, UCx neg.  - F/u rest of viral and AI workup, including HBcAb, HBsAb, HBcAb IgM, HCV RNA, Hep E IgM/PCR, EBV/CMV/HSV/VZV PCRs, CYNTHIA, SMA, LKM, AMA, Ig panel, ceruloplasmin, iron studies/ferritin, A1AT, leptospirosis serology  - Monitor MELD labs, MELD 3.0 28  - Once UTI adequately treated, prednisolone 40 mg /day +/- NAC  (liver failure protocol) to be considered.   --- TODD resolved (relative contraindication).   --- Noted elevated lipase, but no abdominal pain and no definite pancreatitis on CT (was reviewed with radiology), although mild peripancreatic fluid that could be due to portal hypertension as well.  Steroid would not be recommended if evolving pancreatitis, but if stable, can still consider. Please, obtain another lipase and amylase level. If (once) steroid started, [place] would need to be checked at day #4. If no responder, would need transfer to center with transplant availability ([facility]).     --- If MELD (original MELD) is 25 or less, can be also considered for  clinical trial, and in that case transfer to [facility] would ne needed. Today MELD (original) is 24. Will d/w patient steroid vs. clinical trial.   - Please, obtain TTE  - Can consider HIDA, although might be altered by liver dysfunction.  Kevin Traylor.        AUD  - Folic, thiamine  - CIWA per primary team  - SW involvement for rehab      Thank you for consult  Hepatology will follow 63 yo overweight (BMI 29) Male originally from 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 He noticed the jaundice for short time, but has been noticing gradual darkening of his urine since 8/2024.   CT a/p w/ iv 11/2/24 showed enlarged liver (24 cm) w/ cirrhotic morphology, markedly heterogenous liver parenchyma, signs of portal hypertension (varices) cholelithiasis w/ mild GB wall thickening (reported that cholecystitis could not be excluded), and aortic aneurysm.   Subsequent MR/MRCP w/wo 11/5/24 showed enlarged, cirrhotic liver w/ portal hypertension (splenomegaly, varices, trace ascites), cholelithiasis and adenomyomatosis of GB, but no biliary obstruction.       Jaundice  Hepatomegaly  Cirrhosis   Portal hypertension  Suspected alcoholic hepatitis (AH) superimposed to cirrhosis (MetALD)  Trace ascites  TODD - resolved    - MRCP did not show obstruction  - No portal  or hepatic vein thrombosis reported  - Acute viral hepatitis panel neg  - CXR neg, Ascites is trace, unlikely to be sufficient for Dx paracentesis  - Noted pos UA, but UCx neg (per chart it was obtained before antibiotics given), got 1 dose ceftriaxone, was d/c per primary team  - Please, still obtain / follow up rest of viral and AI workup, including HBcAb, HBsAb, HBcAb IgM, HCV RNA, Hep E IgM/PCR, EBV/CMV/HSV/VZV PCRs, CYNTHIA, SMA, LKM, AMA, Ig panel, ceruloplasmin, iron studies/ferritin, A1AT, leptospirosis serology  - Monitor MELD labs, MELD 3.0 28  - Once UTI adequately treated, prednisolone 40 mg /day +/- NAC  (liver failure protocol) to be considered.   --- TODD resolved (relative contraindication).   --- Noted elevated lipase, but no abdominal pain and no definite pancreatitis on CT (was reviewed with radiology), although mild peripancreatic fluid that could be due to portal hypertension as well.  Steroid would not be recommended if evolving pancreatitis / pancreatic necrosis, but if stable, can still consider. Please, obtain another lipase and amylase level. If (once) steroid started, [place] would need to be checked at day #4. If no responder, would need transfer to center with transplant availability ([facility]).    --- If MELD (original MELD) is 25 or less, can be also considered for  clinical trial, and in that case transfer to [facility] would ne needed. Today MELD (original) is 24. Will d/w patient steroid vs. clinical trial.   - Please, obtain TTE  - Can consider HIDA, although might be altered by liver dysfunction.  Kevin Traylor.   - Reports that his wife lives in , but visiting till 11/15 and he will discuss with her if she can stay longer. They have a12 yr old daughter. He also has a roommate and a friend who can reportedly help him.   - Addendum: Discussed steroid vs AH clinical trial w/ patient and he opted for steroid trial and not for the clinical trial. Starting tonight.      AUD  - Folic, thiamine  - CIWA per primary team  - SW involvement for rehab      Thank you for consult  Hepatology will follow  D/w primary team

## 2024-11-07 NOTE — PROGRESS NOTE ADULT - SUBJECTIVE AND OBJECTIVE BOX
[   ] ICU                                          [   ] CCU                                      [ X  ] Medical Floor    Patient is a 64 year old male with jaundice. GI consulted to evaluate.         64 year old male with past medical history significant for HTN, DM, ETOH abuse, Hyperlipidemia, presented to the emergency room with 3 days history of jaundice associated with non bloody vomiting and dark color urine. Patient reported drinking ETOH heavily over past 6 months (1 pint vodka daily). Patient denies abdominal pain, hematemesis, hematochezia, melena, fever, chills, chest pain, SOB, cough, hematuria, dysuria or diarrhea.     Patient appears comfortable. No new complaints reported, No abdominal pain, N/V, hematemesis, hematochezia, melena, fever, chills, chest pain, SOB, cough or diarrhea reported.      PAIN MANAGEMENT:  Pain Scale:                0 /10  Pain Location:         PAST MEDICAL HISTORY    HTN (hypertension)    Hyperlipidemia     Diabetes mellitus    ETOH abuse        PAST SURGICAL HISTORY    No significant surgical history reported        Allergies    No Known Allergies    Intolerances  None         SOCIAL HISTORY  Advanced Directives:       [ X ] Full Code       [  ] DNR  Marital Status:         [  ] M      [ X ] S      [  ] D       [  ] W  Children:       [ X ] Yes      [  ] No  Occupation:        [  ] Employed       [ X ] Unemployed       [  ] Retired  Diet:       [ X ] Regular       [  ] PEG feeding          [  ] NG tube feeding  Drug Use:           [ X ] Patient denied          [  ] Yes  Alcohol:           [  ] No             [  ] Yes (socially)         [ X ] Yes (chronic)  Tobacco:           [  ] Yes           [ X ] No      FAMILY HISTORY  [ X ] Heart Disease            [ X ] Diabetes             [ X ] HTN             [  ] Colon Cancer             [  ] Stomach Cancer              [  ] Pancreatic Cancer        VITALS   Vital Signs Last 24 Hrs  T(C): 37.3 (11-07-24 @ 05:20), Max: 37.7 (11-06-24 @ 20:20)  T(F): 99.2 (11-07-24 @ 05:20), Max: 99.9 (11-06-24 @ 20:20)  HR: 102 (11-07-24 @ 05:20) (100 - 103)  BP: 128/74 (11-07-24 @ 05:20) (128/74 - 134/77)  BP(mean): 90 (11-06-24 @ 20:20) (90 - 90)  RR: 18 (11-07-24 @ 05:20) (16 - 18)  SpO2: 95% (11-07-24 @ 05:20) (95% - 98%)        MEDICATIONS  (STANDING):  amLODIPine   Tablet 10 milliGRAM(s) Oral daily  enoxaparin Injectable 40 milliGRAM(s) SubCutaneous every 24 hours  folic acid 1 milliGRAM(s) Oral daily  influenza   Vaccine 0.5 milliLiter(s) IntraMuscular once  insulin lispro (ADMELOG) corrective regimen sliding scale   SubCutaneous three times a day before meals  insulin lispro (ADMELOG) corrective regimen sliding scale   SubCutaneous at bedtime  multivitamin 1 Tablet(s) Oral daily  pantoprazole    Tablet 40 milliGRAM(s) Oral before breakfast  thiamine 100 milliGRAM(s) Oral daily    MEDICATIONS  (PRN):  aluminum hydroxide/magnesium hydroxide/simethicone Suspension 30 milliLiter(s) Oral every 6 hours PRN Dyspepsia                            8.7    9.64  )-----------( 245      ( 07 Nov 2024 06:20 )             25.0       11-07    133[L]  |  102  |  16  ----------------------------<  130[H]  3.7   |  23  |  1.04    Ca    8.6      07 Nov 2024 06:20    TPro  7.1  /  Alb  2.1[L]  /  TBili  17.2[H]  /  DBili  x   /  AST  116[H]  /  ALT  41  /  AlkPhos  150[H]  11-07      PT/INR - ( 06 Nov 2024 07:03 )   PT: 21.8 sec;   INR: 1.87 ratio

## 2024-11-07 NOTE — PROGRESS NOTE ADULT - PROBLEM SELECTOR PLAN 4
Drinks 1 pint vodka since 5/2024  Last drink on 10/29  No s/s etoh withdrawal  Monitor for [facility]  Social work consulted

## 2024-11-07 NOTE — PROGRESS NOTE ADULT - ASSESSMENT
64 Y M H/O HTN, HLD, DM, alcohol use disorder, presents with new onset painless jaundice. CT and US showing new cirrhosis, varices, gallstones, and could not r/o cholecystitis. Admitted for further evaluation.  S/P MRCP showing no biliary obstruction. Hepatology & GI consulted.   Hepatology is considering whether to start steroids and NAC vs clinical trial and schedule of EGD.

## 2024-11-07 NOTE — PROGRESS NOTE ADULT - ASSESSMENT
Mr. Ramirez is a 65 y/o man from home w/ PMH of HTN and ETOH use presents to the hospital w/ 3 days of jaundice.  He had vomiting x1,  2 weeks ago but nothing ongoing. Patient reports that he retired in May'24 and since then he has been drinking 1 pint of vodka every day- his last drink was on Oct 31st. Pt was started on Mounjaro 6 months ago.  Of note, patient came to [facility] ED on Nov 2nd, he was admitted for hyperbilirubinemia but signed out AMA due to an Emergency.    Labs reviewed:                           8.7    9.64  )-----------( 245      ( 07 Nov 2024 06:20 )             25.0   11-07    133[L]  |  102  |  16  ----------------------------<  130[H]  3.7   |  23  |  1.04    Ca    8.6      07 Nov 2024 06:20    TPro  7.1  /  Alb  2.1[L]  /  TBili  17.2[H]  /  DBili  x   /  AST  116[H]  /  ALT  41  /  AlkPhos  150[H]  11-07    Imaging reviewed:   CT:   Cirrhotic liver morphology. Enlarged liver, 24 cm in craniocaudal   dimension. Markedly heterogeneous attenuation of the liver parenchyma.   Liver lesions cannot be excluded. Recommend contrast enhanced abdominal   MRI for further evaluation. Correlate with AFP level. Sequelae of portal   hypertension including upper abdominal gastroesophageal and anterior   abdominal varices. Trace to small ascites.    Cholelithiasis with mild gallbladder wall edema and surrounding   pericholecystic fluid/ascites in the setting of cirrhosis. Cholecystitis   cannot be excluded.    3.2 x 3.0 cm abdominal aortic aneurysm. Right common iliac artery is   ectatic measuring 1.6 cm. Follow-up CT abdomen and pelvis is recommended   6 months for reevaluation. Multivessel coronary artery calcifications,   aortic root/valve calcification, and mitral annular calcification.    Enlarged prostate.  I independently reviewed:     Hepatomegaly. Suspect a combination of hepatocellular disease and fatty   liver.  Cholelithiasis and distended gallbladder. Possibility of a stone impacted   in the neck of the gallbladder. No sonographic finding to suggest acute   cholecystitis considering negative Traylor's sign.  Mildly prominent CBD. Consider correlation with MRCP.    Cirrhosis with splenomegaly and portal venous collaterals including the   lower esophageal varices. Trace ascites.  Periportal distribution of hepatic steatosis. No focal hepatic mass   lesion.  Cholelithiasis and adenomyomatosis.  No biliary obstruction.    #Hyperbilirubinemia- suspect obstructive 2/2  impacted GB neck stone vs alcoholic hepatitis  #Liver Cirrhosis  #Hyponatremia  #AAA  #HTN  #DVT ppx    Plan:  - Suspect a combination of hepatocellular disease and fatty liver. Cholelithiasis and distended gallbladder. Possibility of a stone impacted  in the neck of the gallbladder. No sonographic finding to suggest acute cholecystitis. No obstruction on MRCP  -Discussed with GI and Hepatology , will discuss whether to start steroids and NAC vs clinical trial and schedule of EGD  -Follow hepatitis panel, rafi, anti-smooth muscle Ab, antimitochondrial ab, AFP , leptospira Ab  -f/u blood cx, UA unremarkable, dc abx  -Given ETOH use, there is also a possibility of alcoholic hepatitis. MDF- 57.8- suggestive of poor prognosis.  Hepatologist Dr. Gillis consulted  -No concern of long term given baseline mental status  -Given cirrhosis, patient would also need EGD for EV screening.  -Sodium low, no s/s of volume overload per se. Patient w/ pedal edema- chronic. s/p 2L IVF Bolus on 2nd Nov in ED- sodium improved . Monitor Na closely  -BP stable, cont amlodipine  for now. Hold valsartan given latrell. baseline Scr unknown  -C/w Lovenox SC .       Total Time Spent 50 minutes  This includes chart review, patient assessment, discussion and collaboration with interdisciplinary team members, excluding ACP.

## 2024-11-07 NOTE — PROGRESS NOTE ADULT - SUBJECTIVE AND OBJECTIVE BOX
NP Note discussed with  primary attending    Patient is a 64y old  Male who presents with a chief complaint of painless jaundice (07 Nov 2024 11:03)      INTERVAL HPI/OVERNIGHT EVENTS: no new complaints    MEDICATIONS  (STANDING):  amLODIPine   Tablet 10 milliGRAM(s) Oral daily  enoxaparin Injectable 40 milliGRAM(s) SubCutaneous every 24 hours  folic acid 1 milliGRAM(s) Oral daily  influenza   Vaccine 0.5 milliLiter(s) IntraMuscular once  insulin lispro (ADMELOG) corrective regimen sliding scale   SubCutaneous three times a day before meals  insulin lispro (ADMELOG) corrective regimen sliding scale   SubCutaneous at bedtime  multivitamin 1 Tablet(s) Oral daily  pantoprazole    Tablet 40 milliGRAM(s) Oral before breakfast  thiamine 100 milliGRAM(s) Oral daily    MEDICATIONS  (PRN):  aluminum hydroxide/magnesium hydroxide/simethicone Suspension 30 milliLiter(s) Oral every 6 hours PRN Dyspepsia      __________________________________________________  REVIEW OF SYSTEMS:    CONSTITUTIONAL: No fever,   EYES: no acute visual disturbances  NECK: No pain or stiffness  RESPIRATORY: No cough; No shortness of breath  CARDIOVASCULAR: No chest pain, no palpitations  GASTROINTESTINAL: No pain. No nausea or vomiting; No diarrhea   NEUROLOGICAL: No headache or numbness, no tremors  MUSCULOSKELETAL: No joint pain, no muscle pain  GENITOURINARY: no dysuria, no frequency, no hesitancy  PSYCHIATRY: no depression , no anxiety  ALL OTHER  ROS negative        Vital Signs Last 24 Hrs  T(C): 37.3 (07 Nov 2024 05:20), Max: 37.7 (06 Nov 2024 20:20)  T(F): 99.2 (07 Nov 2024 05:20), Max: 99.9 (06 Nov 2024 20:20)  HR: 102 (07 Nov 2024 05:20) (100 - 103)  BP: 128/74 (07 Nov 2024 05:20) (128/74 - 134/77)  BP(mean): 90 (06 Nov 2024 20:20) (90 - 90)  RR: 18 (07 Nov 2024 05:20) (16 - 18)  SpO2: 95% (07 Nov 2024 05:20) (95% - 98%)    Parameters below as of 07 Nov 2024 05:20  Patient On (Oxygen Delivery Method): room air        ________________________________________________  PHYSICAL EXAM:  GENERAL: NAD  HEENT: Normocephalic;  conjunctivae and sclerae> jaundiced; moist mucous membranes;   NECK : supple  CHEST/LUNG: Clear to ausculitation bilaterally with good air entry   HEART: S1 S2  regular; no murmurs, gallops or rubs  ABDOMEN: Soft, Nontender, Nondistended; Bowel sounds present  EXTREMITIES: no cyanosis; no edema; no calf tenderness  SKIN: + painless jaundice all over body, warm and dry; no rash  NERVOUS SYSTEM:  Awake and alert; Oriented  to place, person and time ; no new deficits    _________________________________________________  LABS:                        8.7    9.64  )-----------( 245      ( 07 Nov 2024 06:20 )             25.0     11-07    133[L]  |  102  |  16  ----------------------------<  130[H]  3.7   |  23  |  1.04    Ca    8.6      07 Nov 2024 06:20    TPro  7.1  /  Alb  2.1[L]  /  TBili  17.2[H]  /  DBili  x   /  AST  116[H]  /  ALT  41  /  AlkPhos  150[H]  11-07    PT/INR - ( 06 Nov 2024 07:03 )   PT: 21.8 sec;   INR: 1.87 ratio           Urinalysis Basic - ( 07 Nov 2024 06:20 )    Color: x / Appearance: x / SG: x / pH: x  Gluc: 130 mg/dL / Ketone: x  / Bili: x / Urobili: x   Blood: x / Protein: x / Nitrite: x   Leuk Esterase: x / RBC: x / WBC x   Sq Epi: x / Non Sq Epi: x / Bacteria: x      CAPILLARY BLOOD GLUCOSE      POCT Blood Glucose.: 206 mg/dL (07 Nov 2024 11:34)  POCT Blood Glucose.: 154 mg/dL (07 Nov 2024 07:38)  POCT Blood Glucose.: 176 mg/dL (06 Nov 2024 21:08)  POCT Blood Glucose.: 137 mg/dL (06 Nov 2024 16:23)        RADIOLOGY & ADDITIONAL TESTS:  < from: MR MRCP w/wo IV Cont (11.05.24 @ 14:28) >  IMPRESSION:  Cirrhosis with splenomegaly and portal venous collaterals including the   lower esophageal varices. Trace ascites.  Periportal distribution of hepatic steatosis. No focal hepatic mass   lesion.  Cholelithiasis and adenomyomatosis.  No biliary obstruction.        < end of copied text >    Imaging Personally Reviewed:  YES    Consultant(s) Notes Reviewed:   YES    Care Discussed with Consultants :     Plan of care was discussed with patient and /or primary care giver; all questions and concerns were addressed and care was aligned with patient's wishes.

## 2024-11-07 NOTE — PROGRESS NOTE ADULT - PROBLEM SELECTOR PLAN 1
B/W painless jaundice x 3 days, with darker urine  - Differential includes gall stone obstruction, vs alcohol hepatitis vs cholelithiasis and adenomyomatosis of GB  - Maddrey score 57.8  - Drinks 1 pint vodka per day, last drink on 10/29  - RUQ US: No intrahepatic bile duct dilatation visible; the CBD is not visualized due to the limitations of this exam; Cholelithiasis without any convincing evidence for cholecystitis this time  - CTAP: Cholelithiasis with mild gallbladder wall edema and surrounding pericholecystic fluid/ascites in the setting of cirrhosis. Cholecystitis cannot be excluded  - S/P MRCP did not show biliary obstruction  - Acute viral hepatitis panel neg  - CXR neg, Ascites is trace, unlikely to be sufficient for Dx paracentesis  - T Bili 18, D Bili 14, , , ALT 45  - Hepatology Dr Gillis following

## 2024-11-07 NOTE — PROGRESS NOTE ADULT - SUBJECTIVE AND OBJECTIVE BOX
Chief Complaint:  Patient is a 64y old  Male who presents with a chief complaint of painless jaundice (06 Nov 2024 14:04)      Reason for consult:    Interval Events:     Hospital Medications:  aluminum hydroxide/magnesium hydroxide/simethicone Suspension 30 milliLiter(s) Oral every 6 hours PRN  amLODIPine   Tablet 10 milliGRAM(s) Oral daily  enoxaparin Injectable 40 milliGRAM(s) SubCutaneous every 24 hours  folic acid 1 milliGRAM(s) Oral daily  influenza   Vaccine 0.5 milliLiter(s) IntraMuscular once  insulin lispro (ADMELOG) corrective regimen sliding scale   SubCutaneous three times a day before meals  insulin lispro (ADMELOG) corrective regimen sliding scale   SubCutaneous at bedtime  multivitamin 1 Tablet(s) Oral daily  pantoprazole    Tablet 40 milliGRAM(s) Oral before breakfast  thiamine 100 milliGRAM(s) Oral daily      ROS:   General:  No  fevers, chills, night sweats, fatigue  Eyes:  Good vision, no reported pain  ENT:  No sore throat, pain, runny nose  CV:  No pain, palpitations  Pulm:  No dyspnea, cough  GI:  See HPI, otherwise negative  :  No  incontinence, nocturia  Muscle:  No pain, weakness  Neuro:  No memory problems  Psych:  No insomnia, mood problems, depression  Endocrine:  No polyuria, polydipsia, cold/heat intolerance  Heme:  No petechiae, ecchymosis, easy bruisability  Skin:  No rash    PHYSICAL EXAM:   Vital Signs:  Vital Signs Last 24 Hrs  T(C): 37.3 (07 Nov 2024 05:20), Max: 37.7 (06 Nov 2024 20:20)  T(F): 99.2 (07 Nov 2024 05:20), Max: 99.9 (06 Nov 2024 20:20)  HR: 102 (07 Nov 2024 05:20) (100 - 103)  BP: 128/74 (07 Nov 2024 05:20) (128/74 - 134/77)  BP(mean): 90 (06 Nov 2024 20:20) (90 - 90)  RR: 18 (07 Nov 2024 05:20) (16 - 18)  SpO2: 95% (07 Nov 2024 05:20) (95% - 98%)    Parameters below as of 07 Nov 2024 05:20  Patient On (Oxygen Delivery Method): room air      Daily     Daily     GENERAL: no acute distress  NEURO: alert, no asterixis  HEENT: anicteric sclera, no conjunctival pallor appreciated  CHEST: no respiratory distress, no accessory muscle use  CARDIAC: regular rate, rhythm  ABDOMEN: soft, non-tender, non-distended, no rebound or guarding  EXTREMITIES: warm, well perfused, no edema  SKIN: no lesions noted    LABS: reviewed                        8.7    9.64  )-----------( 245      ( 07 Nov 2024 06:20 )             25.0     11-07    133[L]  |  102  |  16  ----------------------------<  130[H]  3.7   |  23  |  1.04    Ca    8.6      07 Nov 2024 06:20    TPro  7.1  /  Alb  2.1[L]  /  TBili  17.2[H]  /  DBili  x   /  AST  116[H]  /  ALT  41  /  AlkPhos  150[H]  11-07    LIVER FUNCTIONS - ( 07 Nov 2024 06:20 )  Alb: 2.1 g/dL / Pro: 7.1 g/dL / ALK PHOS: 150 U/L / ALT: 41 U/L DA / AST: 116 U/L / GGT: x             Interval Diagnostic Studies: see sunrise for full report   Chief Complaint:  Patient is a 64y old  Male who presents with a chief complaint of painless jaundice (06 Nov 2024 14:04)      Reason for consult: Jaundice    Interval Events: Patient was seen and examined at bedside. He reports feeling subjectively better, denies abdominal pain, N/V, bleeding, no encephalopathy.     Hospital Medications:  aluminum hydroxide/magnesium hydroxide/simethicone Suspension 30 milliLiter(s) Oral every 6 hours PRN  amLODIPine   Tablet 10 milliGRAM(s) Oral daily  enoxaparin Injectable 40 milliGRAM(s) SubCutaneous every 24 hours  folic acid 1 milliGRAM(s) Oral daily  influenza   Vaccine 0.5 milliLiter(s) IntraMuscular once  insulin lispro (ADMELOG) corrective regimen sliding scale   SubCutaneous three times a day before meals  insulin lispro (ADMELOG) corrective regimen sliding scale   SubCutaneous at bedtime  multivitamin 1 Tablet(s) Oral daily  pantoprazole    Tablet 40 milliGRAM(s) Oral before breakfast  thiamine 100 milliGRAM(s) Oral daily      ROS:   General:  No  fevers, chills, night sweats, fatigue  Eyes:  Good vision, no reported pain  ENT:  No sore throat, pain, runny nose  CV:  No pain, palpitations  Pulm:  No dyspnea, cough  GI:  See interval events, otherwise negative  :  No  dysuria  Muscle:  No pain, weakness  Neuro:  No memory problems  Skin:  No rash    PHYSICAL EXAM:   Vital Signs:  Vital Signs Last 24 Hrs  T(C): 37.3 (07 Nov 2024 05:20), Max: 37.7 (06 Nov 2024 20:20)  T(F): 99.2 (07 Nov 2024 05:20), Max: 99.9 (06 Nov 2024 20:20)  HR: 102 (07 Nov 2024 05:20) (100 - 103)  BP: 128/74 (07 Nov 2024 05:20) (128/74 - 134/77)  BP(mean): 90 (06 Nov 2024 20:20) (90 - 90)  RR: 18 (07 Nov 2024 05:20) (16 - 18)  SpO2: 95% (07 Nov 2024 05:20) (95% - 98%)    Parameters below as of 07 Nov 2024 05:20  Patient On (Oxygen Delivery Method): room air      Daily     Daily     GENERAL: no acute distress  NEURO: AAOx3, no asterixis  HEENT: icteric sclera, no conjunctival pallor appreciated  CHEST: no respiratory distress, no accessory muscle use  CARDIAC: regular rate, rhythm  ABDOMEN: soft, non-tender, mildly distended, no rebound or guarding, BS+  EXTREMITIES: warm, well perfused, no edema  SKIN: jaundice    LABS: reviewed                        8.7    9.64  )-----------( 245      ( 07 Nov 2024 06:20 )             25.0     11-07    133[L]  |  102  |  16  ----------------------------<  130[H]  3.7   |  23  |  1.04    Ca    8.6      07 Nov 2024 06:20    TPro  7.1  /  Alb  2.1[L]  /  TBili  17.2[H]  /  DBili  x   /  AST  116[H]  /  ALT  41  /  AlkPhos  150[H]  11-07    LIVER FUNCTIONS - ( 07 Nov 2024 06:20 )  Alb: 2.1 g/dL / Pro: 7.1 g/dL / ALK PHOS: 150 U/L / ALT: 41 U/L DA / AST: 116 U/L / GGT: x             Interval Diagnostic Studies: see sunrise for full report

## 2024-11-07 NOTE — PROGRESS NOTE ADULT - SUBJECTIVE AND OBJECTIVE BOX
CC: jaundice  S:  has some weakness when walking  O:  Vital Signs Last 24 Hrs  T(C): 37.3 (11-07-24 @ 05:20), Max: 37.7 (11-06-24 @ 20:20)  T(F): 99.2 (11-07-24 @ 05:20), Max: 99.9 (11-06-24 @ 20:20)  HR: 102 (11-07-24 @ 05:20) (100 - 103)  BP: 128/74 (11-07-24 @ 05:20) (128/74 - 134/77)  BP(mean): 90 (11-06-24 @ 20:20) (90 - 90)  RR: 18 (11-07-24 @ 05:20) (16 - 18)  SpO2: 95% (11-07-24 @ 05:20) (95% - 98%)      PE:  Gen: Oriented, alert, No acute distress Eyes: conjunctivae and lid normal, scleral icterus ENT: nose and throat exam normal CVS: S1, S2, RRR;  no murmur, no rubs, no gallops Pulm: Good air exchange, Breath sounds equal bilaterally, no rales rhonchi,  no wheezes Chest: nontender, no chest deformity, chest movement symmetrical Gl: abdomen soft, nontender, nondistended, bowel sounds normoactive, no masses palpated Musk: no msk pain, no joint swelling Skin: jaundice,  no skin lesions, skin turgor normal, warm and well perfused Neuro: Awake, alert,Psych: normal affect, insight

## 2024-11-08 LAB
ALBUMIN SERPL ELPH-MCNC: 2 G/DL — LOW (ref 3.5–5)
ALP SERPL-CCNC: 140 U/L — HIGH (ref 40–120)
ALT FLD-CCNC: 39 U/L DA — SIGNIFICANT CHANGE UP (ref 10–60)
ANION GAP SERPL CALC-SCNC: 10 MMOL/L — SIGNIFICANT CHANGE UP (ref 5–17)
AST SERPL-CCNC: 100 U/L — HIGH (ref 10–40)
BILIRUB SERPL-MCNC: 16.3 MG/DL — HIGH (ref 0.2–1.2)
BUN SERPL-MCNC: 17 MG/DL — SIGNIFICANT CHANGE UP (ref 7–18)
CALCIUM SERPL-MCNC: 8.6 MG/DL — SIGNIFICANT CHANGE UP (ref 8.4–10.5)
CHLORIDE SERPL-SCNC: 99 MMOL/L — SIGNIFICANT CHANGE UP (ref 96–108)
CO2 SERPL-SCNC: 22 MMOL/L — SIGNIFICANT CHANGE UP (ref 22–31)
CREAT SERPL-MCNC: 1.05 MG/DL — SIGNIFICANT CHANGE UP (ref 0.5–1.3)
EGFR: 80 ML/MIN/1.73M2 — SIGNIFICANT CHANGE UP
GLUCOSE BLDC GLUCOMTR-MCNC: 233 MG/DL — HIGH (ref 70–99)
GLUCOSE BLDC GLUCOMTR-MCNC: 254 MG/DL — HIGH (ref 70–99)
GLUCOSE BLDC GLUCOMTR-MCNC: 292 MG/DL — HIGH (ref 70–99)
GLUCOSE BLDC GLUCOMTR-MCNC: 326 MG/DL — HIGH (ref 70–99)
GLUCOSE SERPL-MCNC: 243 MG/DL — HIGH (ref 70–99)
HCT VFR BLD CALC: 24.5 % — LOW (ref 39–50)
HGB BLD-MCNC: 8.9 G/DL — LOW (ref 13–17)
MCHC RBC-ENTMCNC: 34 PG — SIGNIFICANT CHANGE UP (ref 27–34)
MCHC RBC-ENTMCNC: 36.3 G/DL — HIGH (ref 32–36)
MCV RBC AUTO: 93.5 FL — SIGNIFICANT CHANGE UP (ref 80–100)
NRBC # BLD: 0 /100 WBCS — SIGNIFICANT CHANGE UP (ref 0–0)
PLATELET # BLD AUTO: 263 K/UL — SIGNIFICANT CHANGE UP (ref 150–400)
POTASSIUM SERPL-MCNC: 4.3 MMOL/L — SIGNIFICANT CHANGE UP (ref 3.5–5.3)
POTASSIUM SERPL-SCNC: 4.3 MMOL/L — SIGNIFICANT CHANGE UP (ref 3.5–5.3)
PROT SERPL-MCNC: 7.1 G/DL — SIGNIFICANT CHANGE UP (ref 6–8.3)
RBC # BLD: 2.62 M/UL — LOW (ref 4.2–5.8)
RBC # FLD: 17 % — HIGH (ref 10.3–14.5)
SODIUM SERPL-SCNC: 131 MMOL/L — LOW (ref 135–145)
WBC # BLD: 8.01 K/UL — SIGNIFICANT CHANGE UP (ref 3.8–10.5)
WBC # FLD AUTO: 8.01 K/UL — SIGNIFICANT CHANGE UP (ref 3.8–10.5)

## 2024-11-08 PROCEDURE — 99233 SBSQ HOSP IP/OBS HIGH 50: CPT

## 2024-11-08 RX ADMIN — Medication 1 TABLET(S): at 11:48

## 2024-11-08 RX ADMIN — AMLODIPINE BESYLATE 10 MILLIGRAM(S): 10 TABLET ORAL at 05:30

## 2024-11-08 RX ADMIN — Medication 2: at 07:55

## 2024-11-08 RX ADMIN — Medication 1: at 22:24

## 2024-11-08 RX ADMIN — ENOXAPARIN SODIUM 40 MILLIGRAM(S): 30 INJECTION SUBCUTANEOUS at 18:23

## 2024-11-08 RX ADMIN — Medication 100 MILLIGRAM(S): at 11:48

## 2024-11-08 RX ADMIN — Medication 44.79 GRAM(S): at 22:23

## 2024-11-08 RX ADMIN — PREDNISONE 40 MILLIGRAM(S): 20 TABLET ORAL at 05:30

## 2024-11-08 RX ADMIN — Medication 3: at 11:55

## 2024-11-08 RX ADMIN — PANTOPRAZOLE SODIUM 40 MILLIGRAM(S): 40 TABLET, DELAYED RELEASE ORAL at 05:30

## 2024-11-08 RX ADMIN — Medication 1 MILLIGRAM(S): at 11:56

## 2024-11-08 RX ADMIN — Medication 4: at 17:11

## 2024-11-08 NOTE — PROGRESS NOTE ADULT - ASSESSMENT
65 yo overweight (BMI 29) Male originally from [facility] w/ Hx of  HTN, HLD, DM, alcohol use disorder (socially for years, heavily since 5/2024 since his long term, 1 pint of Vodka per day, w/o Hx of withdrawal or alcohol related hospitalization), presented to [facility]  with new onset painless jaundice initially on 11/2/24, subsequently signed out AMA, and returned to [facility] on 11/4/24. He noticed the jaundice for short time, but has been noticing gradual darkening of his urine since 8/2024.   CT a/p w/ iv 11/2/24 showed enlarged liver (24 cm) w/ cirrhotic morphology, markedly heterogenous liver parenchyma, signs of portal hypertension (varices) cholelithiasis w/ mild GB wall thickening (reported that cholecystitis could not be excluded), and aortic aneurysm.   Subsequent MR/MRCP w/wo 11/5/24 showed enlarged, cirrhotic liver w/ portal hypertension (splenomegaly, varices, trace ascites), cholelithiasis and adenomyomatosis of GB, but no biliary obstruction.   He was started on prednisolone and NAC on 11/7 for suspected severe AH after infection was r/o.      Jaundice  Hepatomegaly  Cirrhosis   Portal hypertension  Suspected alcoholic hepatitis (AH) superimposed to cirrhosis (MetALD)  Trace ascites  TODD - resolved  Cholelithiasis  Elevated lipase (no abdominal pain, no imaging finding of pancreatitis)    - MRCP did not show obstruction  - No portal  or hepatic vein thrombosis reported  - W/u so far: Acute viral hepatitis panel neg, SMA, AMA neg, leptospirosis serology neg  - BCx neg x2, CXR neg, Ascites is trace, unlikely to be sufficient for Dx paracentesis (no abdominal pain)  - Noted pos UA w/o symptoms, and subsequent UCx was neg (per chart it was obtained before antibiotics given), got 1 dose ceftriaxone, was d/c per primary team  - Please, still obtain / follow up rest of viral and AI workup, including HBcAb, HBsAb, HBcAb IgM, HCV RNA, Hep E IgM/PCR, EBV/CMV/HSV/VZV PCRs, CYNTHIA,  LKM, Ig panel, ceruloplasmin, iron studies/ferritin, A1AT, as well as Quantiferon, Stronyloides  - Monitor MELD labs, MELD 3.0 28  - Was started on prednisolone 40 mg /day and NAC  (liver failure protocol) on 11/7.  Lille score would need to be checked at day #4 (and day#7).  --- after infectious w/u as above  --- after TODD resolved  --- Noted elevated lipase, but no abdominal pain and no definite pancreatitis on CT (was reviewed with radiology), although mild peripancreatic fluid that could be due to portal hypertension as well.  There was no sign of pancreatic necrosis, evolving pancreatitis.   ---Was discussed AH clinical trial with patient vs. the steroid trial. Patient opted for the steroid trial.   - Given high MELD, especially if will be non-responder to steroid, was discussed w/ [facility] transplant hepatology. As per discussion, to recontact after repeat labs tomorrow.  - Please, obtain TTE  - Could consider HIDA, although might be altered by liver dysfunction.  Neg Traylor, no abdominal pain.   - Reports that his wife lives in , but visiting till 11/15 and he will discuss with her if she can stay longer. They have a12 yr old daughter. He also has a roommate and a friend who can reportedly help him.      AUD  - C/w folic, thiamine  - CIWA per primary team  - SW involvement for rehab      Thank you for consult  Hepatology will follow. Hepatology returns Monday, 11/11. Please, contact GI on call if change in status, questions, concerns.   D/w primary team and [facility] transplant hepatology service attending

## 2024-11-08 NOTE — PROGRESS NOTE ADULT - PROBLEM SELECTOR PLAN 1
B/W painless jaundice x 3 days, with darker urine  - Differential includes gall stone obstruction, vs alcohol hepatitis vs cholelithiasis and adenomyomatosis of GB  - Maddrey score 57.8  - Drinks 1 pint vodka per day, last drink on 10/29  - RUQ US: No intrahepatic bile duct dilatation visible; the CBD is not visualized due to the limitations of this exam; Cholelithiasis without any convincing evidence for cholecystitis this time  - CTAP: Cholelithiasis with mild gallbladder wall edema and surrounding pericholecystic fluid/ascites in the setting of cirrhosis. Cholecystitis cannot be excluded  - S/P MRCP did not show biliary obstruction  - Acute viral hepatitis panel neg  - CXR neg, Ascites is trace, unlikely to be sufficient for Dx paracentesis  - T Bili 18, D Bili 14, , , ALT 45  - started on steroids and NAC for 4 days,   - will need Lille score by day 4  -monitor steroids for toxicity, monitor blood glucose level closely  - Hepatology Dr Gillis following

## 2024-11-08 NOTE — PROGRESS NOTE ADULT - ASSESSMENT
Mr. Ramirez is a 65 y/o man from home w/ PMH of HTN and ETOH use presents to the hospital w/ 3 days of jaundice.  He had vomiting x1,  2 weeks ago but nothing ongoing. Patient reports that he retired in May'24 and since then he has been drinking 1 pint of vodka every day- his last drink was on Oct 31st. Pt was started on Mounjaro 6 months ago.  Of note, patient came to [facility] ED on Nov 2nd, he was admitted for hyperbilirubinemia but signed out AMA due to an Emergency.    Labs reviewed:                              8.9    8.01  )-----------( 263      ( 08 Nov 2024 06:05 )             24.5   11-08    131[L]  |  99  |  17  ----------------------------<  243[H]  4.3   |  22  |  1.05    Ca    8.6      08 Nov 2024 06:05    TPro  7.1  /  Alb  2.0[L]  /  TBili  16.3[H]  /  DBili  x   /  AST  100[H]  /  ALT  39  /  AlkPhos  140[H]  11-08    Imaging reviewed:   CT:   Cirrhotic liver morphology. Enlarged liver, 24 cm in craniocaudal   dimension. Markedly heterogeneous attenuation of the liver parenchyma.   Liver lesions cannot be excluded. Recommend contrast enhanced abdominal   MRI for further evaluation. Correlate with AFP level. Sequelae of portal   hypertension including upper abdominal gastroesophageal and anterior   abdominal varices. Trace to small ascites.    Cholelithiasis with mild gallbladder wall edema and surrounding   pericholecystic fluid/ascites in the setting of cirrhosis. Cholecystitis   cannot be excluded.    3.2 x 3.0 cm abdominal aortic aneurysm. Right common iliac artery is   ectatic measuring 1.6 cm. Follow-up CT abdomen and pelvis is recommended   6 months for reevaluation. Multivessel coronary artery calcifications,   aortic root/valve calcification, and mitral annular calcification.    Enlarged prostate.  I independently reviewed:     Hepatomegaly. Suspect a combination of hepatocellular disease and fatty   liver.  Cholelithiasis and distended gallbladder. Possibility of a stone impacted   in the neck of the gallbladder. No sonographic finding to suggest acute   cholecystitis considering negative Traylor's sign.  Mildly prominent CBD. Consider correlation with MRCP.    Cirrhosis with splenomegaly and portal venous collaterals including the   lower esophageal varices. Trace ascites.  Periportal distribution of hepatic steatosis. No focal hepatic mass   lesion.  Cholelithiasis and adenomyomatosis.  No biliary obstruction.    #Hyperbilirubinemia- suspect obstructive 2/2  impacted GB neck stone vs alcoholic hepatitis  #Liver Cirrhosis  #Hyponatremia  #AAA  #HTN  #DVT ppx    Plan:  - Suspect a combination of hepatocellular disease and fatty liver. Cholelithiasis and distended gallbladder. Possibility of a stone impacted  in the neck of the gallbladder. No sonographic finding to suggest acute cholecystitis. No obstruction on MRCP  -Discussed with GI and Hepatology , discussed with Dr. Gillis, patient is started on steroids and NAC for 4 days, will need Lille score by day 4  -monitor steroids for toxicity, monitor blood glucose level closely  -Follow hepatitis panel, rafi, anti-smooth muscle Ab, antimitochondrial ab, AFP , leptospira Ab  -f/u blood cx, UA unremarkable, dc abx  -Given ETOH use, there is also a possibility of alcoholic hepatitis. MDF- 57.8- suggestive of poor prognosis.  Hepatologist Dr. Gillis consulted  -No concern of detention given baseline mental status  -Given cirrhosis, patient would also need EGD for EV screening.  -Sodium low, no s/s of volume overload per se. Patient w/ pedal edema- chronic. s/p 2L IVF Bolus on 2nd Nov in ED- sodium improved . Monitor Na closely  -BP stable, cont amlodipine  for now. Hold valsartan given latrell. baseline Scr unknown  -C/w Lovenox SC .

## 2024-11-08 NOTE — PROGRESS NOTE ADULT - PROBLEM SELECTOR PLAN 4
Drinks 1 pint vodka since 5/2024  Last drink on 10/29  No s/s etoh withdrawal  Monitor for [facility]  Social work consulted

## 2024-11-08 NOTE — PROGRESS NOTE ADULT - SUBJECTIVE AND OBJECTIVE BOX
NP Note discussed with  primary attending    Patient is a 64y old  Male who presents with a chief complaint of painless jaundice (08 Nov 2024 10:53)      INTERVAL HPI/OVERNIGHT EVENTS: no new complaints    MEDICATIONS  (STANDING):  acetylcysteine IVPB 15 Gram(s) IV Intermittent every 24 hours  amLODIPine   Tablet 10 milliGRAM(s) Oral daily  enoxaparin Injectable 40 milliGRAM(s) SubCutaneous every 24 hours  folic acid 1 milliGRAM(s) Oral daily  influenza   Vaccine 0.5 milliLiter(s) IntraMuscular once  insulin lispro (ADMELOG) corrective regimen sliding scale   SubCutaneous three times a day before meals  insulin lispro (ADMELOG) corrective regimen sliding scale   SubCutaneous at bedtime  multivitamin 1 Tablet(s) Oral daily  pantoprazole    Tablet 40 milliGRAM(s) Oral before breakfast  prednisoLONE    3 mG/mL Solution (ORAPRED) 40 milliGRAM(s) Oral daily  thiamine 100 milliGRAM(s) Oral daily    MEDICATIONS  (PRN):  aluminum hydroxide/magnesium hydroxide/simethicone Suspension 30 milliLiter(s) Oral every 6 hours PRN Dyspepsia      __________________________________________________  REVIEW OF SYSTEMS:    CONSTITUTIONAL: No fever,   EYES: no acute visual disturbances  NECK: No pain or stiffness  RESPIRATORY: No cough; No shortness of breath  CARDIOVASCULAR: No chest pain, no palpitations  GASTROINTESTINAL: No pain. No nausea or vomiting; No diarrhea   NEUROLOGICAL: No headache or numbness, no tremors  MUSCULOSKELETAL: No joint pain, no muscle pain  GENITOURINARY: no dysuria, no frequency, no hesitancy  PSYCHIATRY: no depression , no anxiety  ALL OTHER  ROS negative        Vital Signs Last 24 Hrs  T(C): 37.2 (08 Nov 2024 05:20), Max: 37.4 (07 Nov 2024 13:50)  T(F): 99 (08 Nov 2024 05:20), Max: 99.4 (07 Nov 2024 13:50)  HR: 93 (08 Nov 2024 05:20) (93 - 106)  BP: 149/80 (08 Nov 2024 05:20) (115/70 - 149/80)  BP(mean): 90 (07 Nov 2024 20:45) (90 - 90)  RR: 18 (08 Nov 2024 05:20) (17 - 18)  SpO2: 96% (08 Nov 2024 05:20) (96% - 96%)    Parameters below as of 08 Nov 2024 05:20  Patient On (Oxygen Delivery Method): room air        ________________________________________________  PHYSICAL EXAM:  GENERAL: NAD  HEENT: Normocephalic; + jaundice, conjunctivae and sclerae clear; moist mucous membranes;   NECK : supple  CHEST/LUNG: Clear to ausculitation bilaterally with good air entry   HEART: S1 S2  regular; no murmurs, gallops or rubs  ABDOMEN: Soft, Nontender, Nondistended; Bowel sounds present  EXTREMITIES: no cyanosis; no edema; no calf tenderness  SKIN: + Jaundice, warm and dry; no rash  NERVOUS SYSTEM:  Awake and alert; Oriented  to place, person and time ; no new deficits    _________________________________________________  LABS:                        8.9    8.01  )-----------( 263      ( 08 Nov 2024 06:05 )             24.5     11-08    131[L]  |  99  |  17  ----------------------------<  243[H]  4.3   |  22  |  1.05    Ca    8.6      08 Nov 2024 06:05    TPro  7.1  /  Alb  2.0[L]  /  TBili  16.3[H]  /  DBili  x   /  AST  100[H]  /  ALT  39  /  AlkPhos  140[H]  11-08      Urinalysis Basic - ( 08 Nov 2024 06:05 )    Color: x / Appearance: x / SG: x / pH: x  Gluc: 243 mg/dL / Ketone: x  / Bili: x / Urobili: x   Blood: x / Protein: x / Nitrite: x   Leuk Esterase: x / RBC: x / WBC x   Sq Epi: x / Non Sq Epi: x / Bacteria: x      CAPILLARY BLOOD GLUCOSE      POCT Blood Glucose.: 292 mg/dL (08 Nov 2024 11:27)  POCT Blood Glucose.: 233 mg/dL (08 Nov 2024 07:50)  POCT Blood Glucose.: 258 mg/dL (07 Nov 2024 21:34)  POCT Blood Glucose.: 161 mg/dL (07 Nov 2024 16:49)        RADIOLOGY & ADDITIONAL TESTS:  < from: MR MRCP w/wo IV Cont (11.05.24 @ 14:28) >  IMPRESSION:  Cirrhosis with splenomegaly and portal venous collaterals including the   lower esophageal varices. Trace ascites.  Periportal distribution of hepatic steatosis. No focal hepatic mass   lesion.  Cholelithiasis and adenomyomatosis.  No biliary obstruction.    < end of copied text >    Imaging Personally Reviewed:  YES  Consultant(s) Notes Reviewed:   YES    Care Discussed with Consultants :     Plan of care was discussed with patient and /or primary care giver; all questions and concerns were addressed and care was aligned with patient's wishes.

## 2024-11-08 NOTE — PROGRESS NOTE ADULT - ASSESSMENT
64 Y M H/O HTN, HLD, DM, alcohol use disorder, presents with new onset painless jaundice. CT and US showing new cirrhosis, varices, gallstones, and could not r/o cholecystitis. Admitted for further evaluation.  S/P MRCP showing no biliary obstruction. Hepatology & GI consulted.   Hepatology started on steroids and NAC for 4 days, will need Lille score by day 4. monitor steroids for toxicity, monitor blood glucose level closely

## 2024-11-08 NOTE — PROGRESS NOTE ADULT - SUBJECTIVE AND OBJECTIVE BOX
Chief Complaint:  Patient is a 64y old  Male who presents with a chief complaint of painless jaundice (07 Nov 2024 13:17)      Reason for consult:    Interval Events:     Hospital Medications:  acetylcysteine IVPB 15 Gram(s) IV Intermittent every 24 hours  aluminum hydroxide/magnesium hydroxide/simethicone Suspension 30 milliLiter(s) Oral every 6 hours PRN  amLODIPine   Tablet 10 milliGRAM(s) Oral daily  enoxaparin Injectable 40 milliGRAM(s) SubCutaneous every 24 hours  folic acid 1 milliGRAM(s) Oral daily  influenza   Vaccine 0.5 milliLiter(s) IntraMuscular once  insulin lispro (ADMELOG) corrective regimen sliding scale   SubCutaneous three times a day before meals  insulin lispro (ADMELOG) corrective regimen sliding scale   SubCutaneous at bedtime  multivitamin 1 Tablet(s) Oral daily  pantoprazole    Tablet 40 milliGRAM(s) Oral before breakfast  predniSONE   Tablet 40 milliGRAM(s) Oral daily  thiamine 100 milliGRAM(s) Oral daily      ROS:   General:  No  fevers, chills, night sweats, fatigue  Eyes:  Good vision, no reported pain  ENT:  No sore throat, pain, runny nose  CV:  No pain, palpitations  Pulm:  No dyspnea, cough  GI:  See HPI, otherwise negative  :  No  incontinence, nocturia  Muscle:  No pain, weakness  Neuro:  No memory problems  Psych:  No insomnia, mood problems, depression  Endocrine:  No polyuria, polydipsia, cold/heat intolerance  Heme:  No petechiae, ecchymosis, easy bruisability  Skin:  No rash    PHYSICAL EXAM:   Vital Signs:  Vital Signs Last 24 Hrs  T(C): 37.2 (08 Nov 2024 05:20), Max: 37.4 (07 Nov 2024 13:50)  T(F): 99 (08 Nov 2024 05:20), Max: 99.4 (07 Nov 2024 13:50)  HR: 93 (08 Nov 2024 05:20) (93 - 106)  BP: 149/80 (08 Nov 2024 05:20) (115/70 - 149/80)  BP(mean): 90 (07 Nov 2024 20:45) (90 - 90)  RR: 18 (08 Nov 2024 05:20) (17 - 18)  SpO2: 96% (08 Nov 2024 05:20) (96% - 96%)    Parameters below as of 08 Nov 2024 05:20  Patient On (Oxygen Delivery Method): room air      Daily     Daily     GENERAL: no acute distress  NEURO: alert, no asterixis  HEENT: anicteric sclera, no conjunctival pallor appreciated  CHEST: no respiratory distress, no accessory muscle use  CARDIAC: regular rate, rhythm  ABDOMEN: soft, non-tender, non-distended, no rebound or guarding  EXTREMITIES: warm, well perfused, no edema  SKIN: no lesions noted    LABS: reviewed                        8.9    8.01  )-----------( 263      ( 08 Nov 2024 06:05 )             24.5     11-08    131[L]  |  99  |  17  ----------------------------<  243[H]  4.3   |  22  |  1.05    Ca    8.6      08 Nov 2024 06:05    TPro  7.1  /  Alb  2.0[L]  /  TBili  16.3[H]  /  DBili  x   /  AST  100[H]  /  ALT  39  /  AlkPhos  140[H]  11-08    LIVER FUNCTIONS - ( 08 Nov 2024 06:05 )  Alb: 2.0 g/dL / Pro: 7.1 g/dL / ALK PHOS: 140 U/L / ALT: 39 U/L DA / AST: 100 U/L / GGT: x             Interval Diagnostic Studies: see sunrise for full report   Chief Complaint:  Patient is a 64y old  Male who presents with a chief complaint of painless jaundice (07 Nov 2024 13:17)      Reason for consult: Jaundice    Interval Events: Patient was seen and examined at bedside. Met his wife at bedside. Subjectively he reported feeling better, no new complaints, denied abdominal pain. Bilirubin slightly improved.    Hospital Medications:  acetylcysteine IVPB 15 Gram(s) IV Intermittent every 24 hours  aluminum hydroxide/magnesium hydroxide/simethicone Suspension 30 milliLiter(s) Oral every 6 hours PRN  amLODIPine   Tablet 10 milliGRAM(s) Oral daily  enoxaparin Injectable 40 milliGRAM(s) SubCutaneous every 24 hours  folic acid 1 milliGRAM(s) Oral daily  influenza   Vaccine 0.5 milliLiter(s) IntraMuscular once  insulin lispro (ADMELOG) corrective regimen sliding scale   SubCutaneous three times a day before meals  insulin lispro (ADMELOG) corrective regimen sliding scale   SubCutaneous at bedtime  multivitamin 1 Tablet(s) Oral daily  pantoprazole    Tablet 40 milliGRAM(s) Oral before breakfast  predniSONE   Tablet 40 milliGRAM(s) Oral daily  thiamine 100 milliGRAM(s) Oral daily      ROS:   General:  No  fevers, chills, night sweats, fatigue  Eyes:  Good vision, no reported pain  ENT:  No sore throat, pain, runny nose  CV:  No pain, palpitations  Pulm:  No dyspnea, cough  GI:  See interval events, otherwise negative  :  No  dysuria  Muscle:  No pain, weakness  Neuro:  No memory problems  Skin:  No rash        PHYSICAL EXAM:   Vital Signs:  Vital Signs Last 24 Hrs  T(C): 37.2 (08 Nov 2024 05:20), Max: 37.4 (07 Nov 2024 13:50)  T(F): 99 (08 Nov 2024 05:20), Max: 99.4 (07 Nov 2024 13:50)  HR: 93 (08 Nov 2024 05:20) (93 - 106)  BP: 149/80 (08 Nov 2024 05:20) (115/70 - 149/80)  BP(mean): 90 (07 Nov 2024 20:45) (90 - 90)  RR: 18 (08 Nov 2024 05:20) (17 - 18)  SpO2: 96% (08 Nov 2024 05:20) (96% - 96%)    Parameters below as of 08 Nov 2024 05:20  Patient On (Oxygen Delivery Method): room air      Daily     Daily       GENERAL: no acute distress  NEURO: AAOx3, no asterixis  HEENT: icteric sclera, no conjunctival pallor appreciated  CHEST: no respiratory distress, no accessory muscle use  CARDIAC: regular rate, rhythm  ABDOMEN: soft, non-tender, mildly distended, no rebound or guarding, BS+  EXTREMITIES: warm, well perfused, no edema  SKIN: jaundice    LABS: reviewed                        8.9    8.01  )-----------( 263      ( 08 Nov 2024 06:05 )             24.5     11-08    131[L]  |  99  |  17  ----------------------------<  243[H]  4.3   |  22  |  1.05    Ca    8.6      08 Nov 2024 06:05    TPro  7.1  /  Alb  2.0[L]  /  TBili  16.3[H]  /  DBili  x   /  AST  100[H]  /  ALT  39  /  AlkPhos  140[H]  11-08    LIVER FUNCTIONS - ( 08 Nov 2024 06:05 )  Alb: 2.0 g/dL / Pro: 7.1 g/dL / ALK PHOS: 140 U/L / ALT: 39 U/L DA / AST: 100 U/L / GGT: x             Interval Diagnostic Studies: see sunrise for full report

## 2024-11-08 NOTE — PROGRESS NOTE ADULT - SUBJECTIVE AND OBJECTIVE BOX
CC: jaundice  S:  no new complaints  O:  Vital Signs Last 24 Hrs  T(C): 37.2 (11-08-24 @ 05:20), Max: 37.4 (11-07-24 @ 13:50)  T(F): 99 (11-08-24 @ 05:20), Max: 99.4 (11-07-24 @ 13:50)  HR: 93 (11-08-24 @ 05:20) (93 - 106)  BP: 149/80 (11-08-24 @ 05:20) (115/70 - 149/80)  BP(mean): 90 (11-07-24 @ 20:45) (90 - 90)  RR: 18 (11-08-24 @ 05:20) (17 - 18)  SpO2: 96% (11-08-24 @ 05:20) (96% - 96%)      PE:  Gen: Oriented, alert, No acute distress Eyes: conjunctivae and lid normal, scleral icterus ENT: nose and throat exam normal CVS: S1, S2, RRR;no murmur, no rubs, no gallops Pulm: Good air exchange, Breath sounds equal bilaterally, no rales rhonchi,  no wheezes Chest: nontender, no chest deformity, chest movement symmetrical Gl: abdomen soft, nontender, nondistended, bowel sounds normoactive, no masses palpated Musk: no msk pain, no joint swelling Skin: jaundice,  no skin lesions, skin turgor normal, warm and well perfused Neuro: Awake, alert,Psych: normal affect, insight

## 2024-11-08 NOTE — PROGRESS NOTE ADULT - SUBJECTIVE AND OBJECTIVE BOX
[   ] ICU                                          [   ] CCU                                      [ X  ] Medical Floor    Patient is a 64 year old male with jaundice. GI consulted to evaluate.         64 year old male with past medical history significant for HTN, DM, ETOH abuse, Hyperlipidemia, presented to the emergency room with 3 days history of jaundice associated with non bloody vomiting and dark color urine. Patient reported drinking ETOH heavily over past 6 months (1 pint vodka daily). Patient denies abdominal pain, hematemesis, hematochezia, melena, fever, chills, chest pain, SOB, cough, hematuria, dysuria or diarrhea.     Patient appears comfortable. No new complaints reported, No abdominal pain, N/V, hematemesis, hematochezia, melena, fever, chills, chest pain, SOB, cough or diarrhea reported.      PAIN MANAGEMENT:  Pain Scale:                0 /10  Pain Location:         PAST MEDICAL HISTORY    HTN (hypertension)    Hyperlipidemia     Diabetes mellitus    ETOH abuse        PAST SURGICAL HISTORY    No significant surgical history reported        Allergies    No Known Allergies    Intolerances  None         SOCIAL HISTORY  Advanced Directives:       [ X ] Full Code       [  ] DNR  Marital Status:         [  ] M      [ X ] S      [  ] D       [  ] W  Children:       [ X ] Yes      [  ] No  Occupation:        [  ] Employed       [ X ] Unemployed       [  ] Retired  Diet:       [ X ] Regular       [  ] PEG feeding          [  ] NG tube feeding  Drug Use:           [ X ] Patient denied          [  ] Yes  Alcohol:           [  ] No             [  ] Yes (socially)         [ X ] Yes (chronic)  Tobacco:           [  ] Yes           [ X ] No      FAMILY HISTORY  [ X ] Heart Disease            [ X ] Diabetes             [ X ] HTN             [  ] Colon Cancer             [  ] Stomach Cancer              [  ] Pancreatic Cancer        VITALS   Vital Signs Last 24 Hrs  T(C): 37.2 (11-08-24 @ 05:20), Max: 37.4 (11-07-24 @ 13:50)  T(F): 99 (11-08-24 @ 05:20), Max: 99.4 (11-07-24 @ 13:50)  HR: 93 (11-08-24 @ 05:20) (93 - 106)  BP: 149/80 (11-08-24 @ 05:20) (115/70 - 149/80)  BP(mean): 90 (11-07-24 @ 20:45) (90 - 90)  RR: 18 (11-08-24 @ 05:20) (17 - 18)  SpO2: 96% (11-08-24 @ 05:20) (96% - 96%)        MEDICATIONS  (STANDING):  acetylcysteine IVPB 15 Gram(s) IV Intermittent every 24 hours  amLODIPine   Tablet 10 milliGRAM(s) Oral daily  enoxaparin Injectable 40 milliGRAM(s) SubCutaneous every 24 hours  folic acid 1 milliGRAM(s) Oral daily  influenza   Vaccine 0.5 milliLiter(s) IntraMuscular once  insulin lispro (ADMELOG) corrective regimen sliding scale   SubCutaneous three times a day before meals  insulin lispro (ADMELOG) corrective regimen sliding scale   SubCutaneous at bedtime  multivitamin 1 Tablet(s) Oral daily  pantoprazole    Tablet 40 milliGRAM(s) Oral before breakfast  predniSONE   Tablet 40 milliGRAM(s) Oral daily  thiamine 100 milliGRAM(s) Oral daily    MEDICATIONS  (PRN):  aluminum hydroxide/magnesium hydroxide/simethicone Suspension 30 milliLiter(s) Oral every 6 hours PRN Dyspepsia                            8.9    8.01  )-----------( 263      ( 08 Nov 2024 06:05 )             24.5       11-08    131[L]  |  99  |  17  ----------------------------<  243[H]  4.3   |  22  |  1.05    Ca    8.6      08 Nov 2024 06:05    TPro  7.1  /  Alb  2.0[L]  /  TBili  16.3[H]  /  DBili  x   /  AST  100[H]  /  ALT  39  /  AlkPhos  140[H]  11-08

## 2024-11-09 LAB
A1AT SERPL-MCNC: 242 MG/DL — HIGH (ref 90–200)
ALBUMIN SERPL ELPH-MCNC: 2.2 G/DL — LOW (ref 3.5–5)
ALP SERPL-CCNC: 148 U/L — HIGH (ref 40–120)
ALT FLD-CCNC: 49 U/L DA — SIGNIFICANT CHANGE UP (ref 10–60)
ANION GAP SERPL CALC-SCNC: 9 MMOL/L — SIGNIFICANT CHANGE UP (ref 5–17)
AST SERPL-CCNC: 97 U/L — HIGH (ref 10–40)
BILIRUB SERPL-MCNC: 15.6 MG/DL — HIGH (ref 0.2–1.2)
BUN SERPL-MCNC: 22 MG/DL — HIGH (ref 7–18)
CALCIUM SERPL-MCNC: 9.2 MG/DL — SIGNIFICANT CHANGE UP (ref 8.4–10.5)
CERULOPLASMIN SERPL-MCNC: 28 MG/DL — SIGNIFICANT CHANGE UP (ref 15–30)
CHLORIDE SERPL-SCNC: 100 MMOL/L — SIGNIFICANT CHANGE UP (ref 96–108)
CO2 SERPL-SCNC: 22 MMOL/L — SIGNIFICANT CHANGE UP (ref 22–31)
CREAT SERPL-MCNC: 1.1 MG/DL — SIGNIFICANT CHANGE UP (ref 0.5–1.3)
EGFR: 76 ML/MIN/1.73M2 — SIGNIFICANT CHANGE UP
FERRITIN SERPL-MCNC: 1079 NG/ML — HIGH (ref 30–400)
GLUCOSE BLDC GLUCOMTR-MCNC: 187 MG/DL — HIGH (ref 70–99)
GLUCOSE BLDC GLUCOMTR-MCNC: 278 MG/DL — HIGH (ref 70–99)
GLUCOSE BLDC GLUCOMTR-MCNC: 317 MG/DL — HIGH (ref 70–99)
GLUCOSE BLDC GLUCOMTR-MCNC: 341 MG/DL — HIGH (ref 70–99)
GLUCOSE SERPL-MCNC: 191 MG/DL — HIGH (ref 70–99)
HBV CORE AB SER-ACNC: SIGNIFICANT CHANGE UP
HBV CORE IGM SER-ACNC: SIGNIFICANT CHANGE UP
HBV SURFACE AB SER-ACNC: ABNORMAL
HCT VFR BLD CALC: 27.8 % — LOW (ref 39–50)
HGB BLD-MCNC: 9.6 G/DL — LOW (ref 13–17)
HSV 1/2 SOURCE: SIGNIFICANT CHANGE UP
HSV1 DNA BLD QL NAA+PROBE: SIGNIFICANT CHANGE UP
HSV2 DNA BLD QL NAA+PROBE: SIGNIFICANT CHANGE UP
IRON SATN MFR SERPL: 84 UG/DL — SIGNIFICANT CHANGE UP (ref 65–170)
IRON SATN MFR SERPL: 99 % — HIGH (ref 20–55)
MCHC RBC-ENTMCNC: 32.3 PG — SIGNIFICANT CHANGE UP (ref 27–34)
MCHC RBC-ENTMCNC: 34.5 G/DL — SIGNIFICANT CHANGE UP (ref 32–36)
MCV RBC AUTO: 93.6 FL — SIGNIFICANT CHANGE UP (ref 80–100)
NRBC # BLD: 0 /100 WBCS — SIGNIFICANT CHANGE UP (ref 0–0)
PLATELET # BLD AUTO: 346 K/UL — SIGNIFICANT CHANGE UP (ref 150–400)
POTASSIUM SERPL-MCNC: 3.5 MMOL/L — SIGNIFICANT CHANGE UP (ref 3.5–5.3)
POTASSIUM SERPL-SCNC: 3.5 MMOL/L — SIGNIFICANT CHANGE UP (ref 3.5–5.3)
PROT SERPL-MCNC: 7.9 G/DL — SIGNIFICANT CHANGE UP (ref 6–8.3)
RBC # BLD: 2.97 M/UL — LOW (ref 4.2–5.8)
RBC # FLD: 17.1 % — HIGH (ref 10.3–14.5)
SODIUM SERPL-SCNC: 131 MMOL/L — LOW (ref 135–145)
TIBC SERPL-MCNC: 85 UG/DL — LOW (ref 250–450)
UIBC SERPL-MCNC: 1 UG/DL — LOW (ref 110–370)
WBC # BLD: 12.69 K/UL — HIGH (ref 3.8–10.5)
WBC # FLD AUTO: 12.69 K/UL — HIGH (ref 3.8–10.5)

## 2024-11-09 PROCEDURE — 99233 SBSQ HOSP IP/OBS HIGH 50: CPT

## 2024-11-09 RX ADMIN — Medication 4: at 12:00

## 2024-11-09 RX ADMIN — ENOXAPARIN SODIUM 40 MILLIGRAM(S): 30 INJECTION SUBCUTANEOUS at 18:26

## 2024-11-09 RX ADMIN — Medication 1 TABLET(S): at 11:59

## 2024-11-09 RX ADMIN — Medication 1 MILLIGRAM(S): at 11:59

## 2024-11-09 RX ADMIN — PANTOPRAZOLE SODIUM 40 MILLIGRAM(S): 40 TABLET, DELAYED RELEASE ORAL at 05:45

## 2024-11-09 RX ADMIN — Medication 1: at 08:37

## 2024-11-09 RX ADMIN — Medication 100 MILLIGRAM(S): at 11:59

## 2024-11-09 RX ADMIN — Medication 4: at 17:26

## 2024-11-09 RX ADMIN — Medication 40 MILLIGRAM(S): at 05:45

## 2024-11-09 RX ADMIN — Medication 1: at 21:09

## 2024-11-09 RX ADMIN — AMLODIPINE BESYLATE 10 MILLIGRAM(S): 10 TABLET ORAL at 05:45

## 2024-11-09 RX ADMIN — Medication 44.79 GRAM(S): at 22:57

## 2024-11-09 NOTE — PROGRESS NOTE ADULT - SUBJECTIVE AND OBJECTIVE BOX
[   ] ICU                                          [   ] CCU                                      [ X  ] Medical Floor    Patient is a 64 year old male with jaundice. GI consulted to evaluate.         64 year old male with past medical history significant for HTN, DM, ETOH abuse, Hyperlipidemia, presented to the emergency room with 3 days history of jaundice associated with non bloody vomiting and dark color urine. Patient reported drinking ETOH heavily over past 6 months (1 pint vodka daily). Patient denies abdominal pain, hematemesis, hematochezia, melena, fever, chills, chest pain, SOB, cough, hematuria, dysuria or diarrhea.     Patient appears comfortable. No new complaints reported, No abdominal pain, N/V, hematemesis, hematochezia, melena, fever, chills, chest pain, SOB, cough or diarrhea reported.      PAIN MANAGEMENT:  Pain Scale:                0 /10  Pain Location:         PAST MEDICAL HISTORY    HTN (hypertension)    Hyperlipidemia     Diabetes mellitus    ETOH abuse        PAST SURGICAL HISTORY    No significant surgical history reported        Allergies    No Known Allergies    Intolerances  None         SOCIAL HISTORY  Advanced Directives:       [ X ] Full Code       [  ] DNR  Marital Status:         [  ] M      [ X ] S      [  ] D       [  ] W  Children:       [ X ] Yes      [  ] No  Occupation:        [  ] Employed       [ X ] Unemployed       [  ] Retired  Diet:       [ X ] Regular       [  ] PEG feeding          [  ] NG tube feeding  Drug Use:           [ X ] Patient denied          [  ] Yes  Alcohol:           [  ] No             [  ] Yes (socially)         [ X ] Yes (chronic)  Tobacco:           [  ] Yes           [ X ] No      FAMILY HISTORY  [ X ] Heart Disease            [ X ] Diabetes             [ X ] HTN             [  ] Colon Cancer             [  ] Stomach Cancer              [  ] Pancreatic Cancer        VITALS   Vital Signs Last 24 Hrs  T(C): 36.9 (09 Nov 2024 05:00), Max: 37.2 (08 Nov 2024 13:12)  T(F): 98.5 (09 Nov 2024 05:00), Max: 98.9 (08 Nov 2024 13:12)  HR: 88 (09 Nov 2024 05:00) (88 - 99)  BP: 134/71 (09 Nov 2024 05:00) (125/73 - 138/74)  BP(mean): 92 (09 Nov 2024 05:00) (92 - 92)  RR: 18 (09 Nov 2024 05:00) (18 - 18)  SpO2: 98% (09 Nov 2024 05:00) (98% - 98%)  Parameters below as of 09 Nov 2024 05:00  Patient On (Oxygen Delivery Method): room air        MEDICATIONS  (STANDING):  acetylcysteine IVPB 15 Gram(s) IV Intermittent every 24 hours  amLODIPine   Tablet 10 milliGRAM(s) Oral daily  enoxaparin Injectable 40 milliGRAM(s) SubCutaneous every 24 hours  folic acid 1 milliGRAM(s) Oral daily  influenza   Vaccine 0.5 milliLiter(s) IntraMuscular once  insulin lispro (ADMELOG) corrective regimen sliding scale   SubCutaneous at bedtime  insulin lispro (ADMELOG) corrective regimen sliding scale   SubCutaneous three times a day before meals  multivitamin 1 Tablet(s) Oral daily  pantoprazole    Tablet 40 milliGRAM(s) Oral before breakfast  prednisoLONE    3 mG/mL Solution (ORAPRED) 40 milliGRAM(s) Oral daily  thiamine 100 milliGRAM(s) Oral daily    MEDICATIONS  (PRN):  aluminum hydroxide/magnesium hydroxide/simethicone Suspension 30 milliLiter(s) Oral every 6 hours PRN Dyspepsia                            9.6    12.69 )-----------( 346      ( 09 Nov 2024 05:35 )             27.8       11-09    131[L]  |  100  |  22[H]  ----------------------------<  191[H]  3.5   |  22  |  1.10    Ca    9.2      09 Nov 2024 05:35    TPro  7.9  /  Alb  2.2[L]  /  TBili  15.6[H]  /  DBili  x   /  AST  97[H]  /  ALT  49  /  AlkPhos  148[H]  11-09

## 2024-11-09 NOTE — PROGRESS NOTE ADULT - ASSESSMENT
Mr. Ramirez is a 63 y/o man from home w/ PMH of HTN and ETOH use presents to the hospital w/ 3 days of jaundice.  He had vomiting x1,  2 weeks ago but nothing ongoing. Patient reports that he retired in May'24 and since then he has been drinking 1 pint of vodka every day- his last drink was on Oct 31st. Pt was started on Mounjaro 6 months ago.  Of note, patient came to [facility] ED on Nov 2nd, he was admitted for hyperbilirubinemia but signed out AMA due to an Emergency.    Labs reviewed:                           9.6    12.69 )-----------( [id number]      ( 09 Nov 2024 05:35 )             27.8     11-09    131[L]  |  100  |  22[H]  ----------------------------<  191[H]  3.5   |  22  |  1.10    Ca    9.2      09 Nov 2024 05:35    TPro  7.9  /  Alb  2.2[L]  /  TBili  15.6[H]  /  DBili  x   /  AST  97[H]  /  ALT  49  /  AlkPhos  148[H]  11-09    Imaging reviewed:   CT:   Cirrhotic liver morphology. Enlarged liver, 24 cm in craniocaudal   dimension. Markedly heterogeneous attenuation of the liver parenchyma.   Liver lesions cannot be excluded. Recommend contrast enhanced abdominal   MRI for further evaluation. Correlate with AFP level. Sequelae of portal   hypertension including upper abdominal gastroesophageal and anterior   abdominal varices. Trace to small ascites.    Cholelithiasis with mild gallbladder wall edema and surrounding   pericholecystic fluid/ascites in the setting of cirrhosis. Cholecystitis   cannot be excluded.    3.2 x 3.0 cm abdominal aortic aneurysm. Right common iliac artery is   ectatic measuring 1.6 cm. Follow-up CT abdomen and pelvis is recommended   6 months for reevaluation. Multivessel coronary artery calcifications,   aortic root/valve calcification, and mitral annular calcification.    Enlarged prostate.  I independently reviewed:     Hepatomegaly. Suspect a combination of hepatocellular disease and fatty   liver.  Cholelithiasis and distended gallbladder. Possibility of a stone impacted   in the neck of the gallbladder. No sonographic finding to suggest acute   cholecystitis considering negative Traylor's sign.  Mildly prominent CBD. Consider correlation with MRCP.    Cirrhosis with splenomegaly and portal venous collaterals including the   lower esophageal varices. Trace ascites.  Periportal distribution of hepatic steatosis. No focal hepatic mass   lesion.  Cholelithiasis and adenomyomatosis.  No biliary obstruction.    #Hyperbilirubinemia- suspect obstructive 2/2  impacted GB neck stone vs alcoholic hepatitis  #Liver Cirrhosis  #Hyponatremia  #AAA  #HTN  #DVT ppx    Plan:  - Suspect a combination of hepatocellular disease and fatty liver. Cholelithiasis and distended gallbladder. Possibility of a stone impacted  in the neck of the gallbladder. No sonographic finding to suggest acute cholecystitis. No obstruction on MRCP  -Discussed with GI and Hepatology , discussed with Dr. Gillis, patient is started on steroids and NAC for 4 days, will need Lille score by day 4  -monitor steroids for toxicity, monitor blood glucose level closely  -Follow hepatitis panel, rafi, anti-smooth muscle Ab, antimitochondrial ab, AFP , leptospira Ab  -f/u blood cx, UA unremarkable, dc abx  -Given ETOH use, there is also a possibility of alcoholic hepatitis. MDF- 57.8- suggestive of poor prognosis.  Hepatologist Dr. Gillis consulted  -No concern of senior care given baseline mental status  -Given cirrhosis, patient would also need EGD for EV screening.  -Sodium low, no s/s of volume overload per se. Patient w/ pedal edema- chronic. s/p 2L IVF Bolus on 2nd Nov in ED- sodium improved . Monitor Na closely  -BP stable, cont amlodipine  for now. Hold valsartan given latrell. baseline Scr unknown  -C/w Lovenox SC .

## 2024-11-09 NOTE — PROGRESS NOTE ADULT - SUBJECTIVE AND OBJECTIVE BOX
CC: jaundice  S:  he feels well, denies any pain  O:  Vital Signs Last 24 Hrs  T(C): 36.9 (09 Nov 2024 05:00), Max: 37.2 (08 Nov 2024 13:12)  T(F): 98.5 (09 Nov 2024 05:00), Max: 98.9 (08 Nov 2024 13:12)  HR: 88 (09 Nov 2024 05:00) (88 - 99)  BP: 134/71 (09 Nov 2024 05:00) (125/73 - 138/74)  BP(mean): 92 (09 Nov 2024 05:00) (92 - 92)  RR: 18 (09 Nov 2024 05:00) (18 - 18)  SpO2: 98% (09 Nov 2024 05:00) (98% - 98%)    Parameters below as of 09 Nov 2024 05:00  Patient On (Oxygen Delivery Method): room air          PE:  Gen: Oriented, alert, No acute distress Eyes: conjunctivae and lid normal, scleral icterus ENT: nose and throat exam normal CVS: S1, S2, RRR;no murmur, no rubs, no gallops Pulm: Good air exchange, Breath sounds equal bilaterally, no rales rhonchi,  no wheezes Chest: nontender, no chest deformity, chest movement symmetrical Gl: abdomen soft, nontender, nondistended, bowel sounds normoactive, no masses palpated Musk: no msk pain, no joint swelling Skin: jaundice,  no skin lesions, skin turgor normal, warm and well perfused Neuro: Awake, alert,Psych: normal affect, insight

## 2024-11-10 LAB
ALBUMIN SERPL ELPH-MCNC: 2 G/DL — LOW (ref 3.5–5)
ALP SERPL-CCNC: 137 U/L — HIGH (ref 40–120)
ALT FLD-CCNC: 64 U/L DA — HIGH (ref 10–60)
ANION GAP SERPL CALC-SCNC: 9 MMOL/L — SIGNIFICANT CHANGE UP (ref 5–17)
APTT BLD: 34.3 SEC — SIGNIFICANT CHANGE UP (ref 24.5–35.6)
AST SERPL-CCNC: 141 U/L — HIGH (ref 10–40)
BILIRUB SERPL-MCNC: 13.1 MG/DL — HIGH (ref 0.2–1.2)
BUN SERPL-MCNC: 20 MG/DL — HIGH (ref 7–18)
CALCIUM SERPL-MCNC: 9.2 MG/DL — SIGNIFICANT CHANGE UP (ref 8.4–10.5)
CHLORIDE SERPL-SCNC: 102 MMOL/L — SIGNIFICANT CHANGE UP (ref 96–108)
CO2 SERPL-SCNC: 25 MMOL/L — SIGNIFICANT CHANGE UP (ref 22–31)
CREAT SERPL-MCNC: 1.08 MG/DL — SIGNIFICANT CHANGE UP (ref 0.5–1.3)
CULTURE RESULTS: SIGNIFICANT CHANGE UP
CULTURE RESULTS: SIGNIFICANT CHANGE UP
EGFR: 78 ML/MIN/1.73M2 — SIGNIFICANT CHANGE UP
GLUCOSE BLDC GLUCOMTR-MCNC: 184 MG/DL — HIGH (ref 70–99)
GLUCOSE BLDC GLUCOMTR-MCNC: 293 MG/DL — HIGH (ref 70–99)
GLUCOSE BLDC GLUCOMTR-MCNC: 345 MG/DL — HIGH (ref 70–99)
GLUCOSE BLDC GLUCOMTR-MCNC: 365 MG/DL — HIGH (ref 70–99)
GLUCOSE SERPL-MCNC: 173 MG/DL — HIGH (ref 70–99)
HCT VFR BLD CALC: 27.7 % — LOW (ref 39–50)
HGB BLD-MCNC: 9.4 G/DL — LOW (ref 13–17)
INR BLD: 1.88 RATIO — HIGH (ref 0.85–1.16)
MAGNESIUM SERPL-MCNC: 1.8 MG/DL — SIGNIFICANT CHANGE UP (ref 1.6–2.6)
MCHC RBC-ENTMCNC: 32.1 PG — SIGNIFICANT CHANGE UP (ref 27–34)
MCHC RBC-ENTMCNC: 33.9 G/DL — SIGNIFICANT CHANGE UP (ref 32–36)
MCV RBC AUTO: 94.5 FL — SIGNIFICANT CHANGE UP (ref 80–100)
NRBC # BLD: 0 /100 WBCS — SIGNIFICANT CHANGE UP (ref 0–0)
PHOSPHATE SERPL-MCNC: 1.7 MG/DL — LOW (ref 2.5–4.5)
PLATELET # BLD AUTO: 322 K/UL — SIGNIFICANT CHANGE UP (ref 150–400)
POTASSIUM SERPL-MCNC: 3.5 MMOL/L — SIGNIFICANT CHANGE UP (ref 3.5–5.3)
POTASSIUM SERPL-SCNC: 3.5 MMOL/L — SIGNIFICANT CHANGE UP (ref 3.5–5.3)
PROT SERPL-MCNC: 7.2 G/DL — SIGNIFICANT CHANGE UP (ref 6–8.3)
PROTHROM AB SERPL-ACNC: 21.9 SEC — HIGH (ref 9.9–13.4)
RBC # BLD: 2.93 M/UL — LOW (ref 4.2–5.8)
RBC # FLD: 17.4 % — HIGH (ref 10.3–14.5)
SODIUM SERPL-SCNC: 136 MMOL/L — SIGNIFICANT CHANGE UP (ref 135–145)
SPECIMEN SOURCE: SIGNIFICANT CHANGE UP
SPECIMEN SOURCE: SIGNIFICANT CHANGE UP
WBC # BLD: 11.64 K/UL — HIGH (ref 3.8–10.5)
WBC # FLD AUTO: 11.64 K/UL — HIGH (ref 3.8–10.5)

## 2024-11-10 PROCEDURE — 99232 SBSQ HOSP IP/OBS MODERATE 35: CPT

## 2024-11-10 RX ORDER — SODIUM,POTASSIUM PHOSPHATES 278-250MG
2 POWDER IN PACKET (EA) ORAL EVERY 6 HOURS
Refills: 0 | Status: DISCONTINUED | OUTPATIENT
Start: 2024-11-10 | End: 2024-11-10

## 2024-11-10 RX ORDER — POTASSIUM PHOSPHATE, MONOBASIC POTASSIUM PHOSPHATE, DIBASIC INJECTION, 236; 224 MG/ML; MG/ML
30 SOLUTION, CONCENTRATE INTRAVENOUS ONCE
Refills: 0 | Status: DISCONTINUED | OUTPATIENT
Start: 2024-11-10 | End: 2024-11-10

## 2024-11-10 RX ORDER — SODIUM,POTASSIUM PHOSPHATES 278-250MG
2 POWDER IN PACKET (EA) ORAL EVERY 6 HOURS
Refills: 0 | Status: COMPLETED | OUTPATIENT
Start: 2024-11-10 | End: 2024-11-11

## 2024-11-10 RX ADMIN — Medication 40 MILLIGRAM(S): at 05:18

## 2024-11-10 RX ADMIN — Medication 100 MILLIGRAM(S): at 12:03

## 2024-11-10 RX ADMIN — Medication 4: at 12:05

## 2024-11-10 RX ADMIN — ENOXAPARIN SODIUM 40 MILLIGRAM(S): 30 INJECTION SUBCUTANEOUS at 18:44

## 2024-11-10 RX ADMIN — AMLODIPINE BESYLATE 10 MILLIGRAM(S): 10 TABLET ORAL at 05:18

## 2024-11-10 RX ADMIN — Medication 1 MILLIGRAM(S): at 12:04

## 2024-11-10 RX ADMIN — Medication 2 TABLET(S): at 22:43

## 2024-11-10 RX ADMIN — Medication 1: at 08:06

## 2024-11-10 RX ADMIN — Medication 2 TABLET(S): at 17:10

## 2024-11-10 RX ADMIN — Medication 44.79 GRAM(S): at 23:46

## 2024-11-10 RX ADMIN — Medication 1: at 21:27

## 2024-11-10 RX ADMIN — Medication 1 TABLET(S): at 12:03

## 2024-11-10 RX ADMIN — PANTOPRAZOLE SODIUM 40 MILLIGRAM(S): 40 TABLET, DELAYED RELEASE ORAL at 05:18

## 2024-11-10 RX ADMIN — Medication 2 TABLET(S): at 09:33

## 2024-11-10 RX ADMIN — Medication 5: at 17:09

## 2024-11-10 RX ADMIN — Medication 2 TABLET(S): at 12:03

## 2024-11-10 NOTE — PROGRESS NOTE ADULT - ASSESSMENT
Mr. Ramirez is a 65 y/o man from home w/ PMH of HTN and ETOH use presents to the hospital w/ 3 days of jaundice.  He had vomiting x1,  2 weeks ago but nothing ongoing. Patient reports that he retired in May'24 and since then he has been drinking 1 pint of vodka every day- his last drink was on Oct 31st. Pt was started on Mounjaro 6 months ago.  Of note, patient came to [facility] ED on Nov 2nd, he was admitted for hyperbilirubinemia but signed out AMA due to an Emergency.    Labs reviewed:                                  9.4    11.64 )-----------( [id number]      ( 10 Nov 2024 06:10 )             27.7   11-10    136  |  102  |  20[H]  ----------------------------<  173[H]  3.5   |  25  |  1.08    Ca    9.2      10 Nov 2024 06:10  Phos  1.7     11-10  Mg     1.8     11-10    TPro  7.2  /  Alb  2.0[L]  /  TBili  13.1[H]  /  DBili  x   /  AST  141[H]  /  ALT  64[H]  /  AlkPhos  137[H]  11-10      Imaging reviewed:   CT:   Cirrhotic liver morphology. Enlarged liver, 24 cm in craniocaudal   dimension. Markedly heterogeneous attenuation of the liver parenchyma.   Liver lesions cannot be excluded. Recommend contrast enhanced abdominal   MRI for further evaluation. Correlate with AFP level. Sequelae of portal   hypertension including upper abdominal gastroesophageal and anterior   abdominal varices. Trace to small ascites.    Cholelithiasis with mild gallbladder wall edema and surrounding   pericholecystic fluid/ascites in the setting of cirrhosis. Cholecystitis   cannot be excluded.    3.2 x 3.0 cm abdominal aortic aneurysm. Right common iliac artery is   ectatic measuring 1.6 cm. Follow-up CT abdomen and pelvis is recommended   6 months for reevaluation. Multivessel coronary artery calcifications,   aortic root/valve calcification, and mitral annular calcification.    Enlarged prostate.  I independently reviewed:     Hepatomegaly. Suspect a combination of hepatocellular disease and fatty   liver.  Cholelithiasis and distended gallbladder. Possibility of a stone impacted   in the neck of the gallbladder. No sonographic finding to suggest acute   cholecystitis considering negative Traylor's sign.  Mildly prominent CBD. Consider correlation with MRCP.    Cirrhosis with splenomegaly and portal venous collaterals including the   lower esophageal varices. Trace ascites.  Periportal distribution of hepatic steatosis. No focal hepatic mass   lesion.  Cholelithiasis and adenomyomatosis.  No biliary obstruction.    #Hyperbilirubinemia- suspect obstructive 2/2  impacted GB neck stone vs alcoholic hepatitis  #Liver Cirrhosis  #Hypophosphatemia  #AAA  #HTN  #DVT ppx    Plan:  - Suspect a combination of hepatocellular disease and fatty liver. Cholelithiasis and distended gallbladder. Possibility of a stone impacted  in the neck of the gallbladder. No sonographic finding to suggest acute cholecystitis. No obstruction on MRCP  -Discussed with GI and Hepatology , discussed with Dr. Gillis, patient is started on steroids and NAC for 4 days, obtain Lille score  -monitor steroids for toxicity, monitor blood glucose level closely  -Follow hepatitis panel, rafi, anti-smooth muscle Ab, antimitochondrial ab, AFP , leptospira Ab  -f/u blood cx, UA unremarkable, dc abx  -Given ETOH use, there is also a possibility of alcoholic hepatitis. MDF- 57.8- suggestive of poor prognosis.  Hepatologist Dr. Gillis consulted  -No concern of KEREN given baseline mental status  -Given cirrhosis, patient would also need EGD for EV screening.  -Sodium low, no s/s of volume overload per se. Patient w/ pedal edema- chronic. s/p 2L IVF Bolus on 2nd Nov in ED- sodium improved . Monitor Na closely  -BP stable, cont amlodipine  for now. Hold valsartan given latrell. baseline Scr unknown  -replace phosphate, monitor electrolytes  -C/w Lovenox SC .

## 2024-11-10 NOTE — PROGRESS NOTE ADULT - SUBJECTIVE AND OBJECTIVE BOX
[   ] ICU                                          [   ] CCU                                      [ X  ] Medical Floor    Patient is a 64 year old male with jaundice. GI consulted to evaluate.         64 year old male with past medical history significant for HTN, DM, ETOH abuse, Hyperlipidemia, presented to the emergency room with 3 days history of jaundice associated with non bloody vomiting and dark color urine. Patient reported drinking ETOH heavily over past 6 months (1 pint vodka daily). Patient denies abdominal pain, hematemesis, hematochezia, melena, fever, chills, chest pain, SOB, cough, hematuria, dysuria or diarrhea.     Patient appears comfortable. No new complaints reported, No abdominal pain, N/V, hematemesis, hematochezia, melena, fever, chills, chest pain, SOB, cough or diarrhea reported.      PAIN MANAGEMENT:  Pain Scale:                0 /10  Pain Location:         PAST MEDICAL HISTORY    HTN (hypertension)    Hyperlipidemia     Diabetes mellitus    ETOH abuse        PAST SURGICAL HISTORY    No significant surgical history reported        Allergies    No Known Allergies    Intolerances  None         SOCIAL HISTORY  Advanced Directives:       [ X ] Full Code       [  ] DNR  Marital Status:         [  ] M      [ X ] S      [  ] D       [  ] W  Children:       [ X ] Yes      [  ] No  Occupation:        [  ] Employed       [ X ] Unemployed       [  ] Retired  Diet:       [ X ] Regular       [  ] PEG feeding          [  ] NG tube feeding  Drug Use:           [ X ] Patient denied          [  ] Yes  Alcohol:           [  ] No             [  ] Yes (socially)         [ X ] Yes (chronic)  Tobacco:           [  ] Yes           [ X ] No      FAMILY HISTORY  [ X ] Heart Disease            [ X ] Diabetes             [ X ] HTN             [  ] Colon Cancer             [  ] Stomach Cancer              [  ] Pancreatic Cancer        VITALS   Vital Signs Last 24 Hrs  T(C): 37.2 (11-10-24 @ 12:31), Max: 37.2 (11-10-24 @ 12:31)  T(F): 99 (11-10-24 @ 12:31), Max: 99 (11-10-24 @ 12:31)  HR: 105 (11-10-24 @ 12:31) (88 - 105)  BP: 131/70 (11-10-24 @ 12:31) (124/71 - 131/70)  BP(mean): 89 (11-09-24 @ 21:16) (89 - 89)  RR: 18 (11-10-24 @ 12:31) (18 - 19)  SpO2: 97% (11-10-24 @ 12:31) (97% - 98%)      MEDICATIONS  (STANDING):  acetylcysteine IVPB 15 Gram(s) IV Intermittent every 24 hours  amLODIPine   Tablet 10 milliGRAM(s) Oral daily  enoxaparin Injectable 40 milliGRAM(s) SubCutaneous every 24 hours  folic acid 1 milliGRAM(s) Oral daily  influenza   Vaccine 0.5 milliLiter(s) IntraMuscular once  insulin lispro (ADMELOG) corrective regimen sliding scale   SubCutaneous three times a day before meals  insulin lispro (ADMELOG) corrective regimen sliding scale   SubCutaneous at bedtime  multivitamin 1 Tablet(s) Oral daily  pantoprazole    Tablet 40 milliGRAM(s) Oral before breakfast  potassium phosphate / sodium phosphate Tablet (K-PHOS No. 2) 2 Tablet(s) Oral every 6 hours  prednisoLONE    3 mG/mL Solution (ORAPRED) 40 milliGRAM(s) Oral daily  thiamine 100 milliGRAM(s) Oral daily    MEDICATIONS  (PRN):  aluminum hydroxide/magnesium hydroxide/simethicone Suspension 30 milliLiter(s) Oral every 6 hours PRN Dyspepsia                            9.4    11.64 )-----------( 322      ( 10 Nov 2024 06:10 )             27.7       11-10    136  |  102  |  20[H]  ----------------------------<  173[H]  3.5   |  25  |  1.08    Ca    9.2      10 Nov 2024 06:10  Phos  1.7     11-10  Mg     1.8     11-10    TPro  7.2  /  Alb  2.0[L]  /  TBili  13.1[H]  /  DBili  x   /  AST  141[H]  /  ALT  64[H]  /  AlkPhos  137[H]  11-10      PT/INR - ( 10 Nov 2024 12:10 )   PT: 21.9 sec;   INR: 1.88 ratio         PTT - ( 10 Nov 2024 12:10 )  PTT:34.3 sec

## 2024-11-10 NOTE — PROGRESS NOTE ADULT - SUBJECTIVE AND OBJECTIVE BOX
CC: jaundice  S:  denies any pain  O:  Vital Signs Last 24 Hrs  T(C): 37.2 (11-10-24 @ 12:31), Max: 37.2 (11-10-24 @ 12:31)  T(F): 99 (11-10-24 @ 12:31), Max: 99 (11-10-24 @ 12:31)  HR: 105 (11-10-24 @ 12:31) (88 - 105)  BP: 131/70 (11-10-24 @ 12:31) (122/70 - 131/70)  BP(mean): 89 (11-09-24 @ 21:16) (89 - 89)  RR: 18 (11-10-24 @ 12:31) (18 - 19)  SpO2: 97% (11-10-24 @ 12:31) (97% - 98%)            PE:  Gen: Oriented, alert, No acute distress Eyes: conjunctivae and lid normal, scleral icterus ENT: nose and throat exam normal CVS: S1, S2, RRR;no murmur, no rubs, no gallops Pulm: Good air exchange, Breath sounds equal bilaterally, no rales rhonchi,  no wheezes Chest: nontender, no chest deformity, chest movement symmetrical Gl: abdomen soft, nontender, nondistended, bowel sounds normoactive, no masses palpated Musk: no msk pain, no joint swelling Skin: jaundice,  no skin lesions, skin turgor normal, warm and well perfused Neuro: Awake, alert,Psych: normal affect, insight

## 2024-11-11 DIAGNOSIS — R74.01 ELEVATION OF LEVELS OF LIVER TRANSAMINASE LEVELS: ICD-10-CM

## 2024-11-11 PROBLEM — E11.9 TYPE 2 DIABETES MELLITUS WITHOUT COMPLICATIONS: Chronic | Status: ACTIVE | Noted: 2024-11-03

## 2024-11-11 PROBLEM — I10 ESSENTIAL (PRIMARY) HYPERTENSION: Chronic | Status: ACTIVE | Noted: 2024-11-03

## 2024-11-11 PROBLEM — E78.5 HYPERLIPIDEMIA, UNSPECIFIED: Chronic | Status: ACTIVE | Noted: 2024-11-03

## 2024-11-11 LAB
ALBUMIN SERPL ELPH-MCNC: 1.9 G/DL — LOW (ref 3.5–5)
ALP SERPL-CCNC: 130 U/L — HIGH (ref 40–120)
ALT FLD-CCNC: 77 U/L DA — HIGH (ref 10–60)
ANION GAP SERPL CALC-SCNC: 10 MMOL/L — SIGNIFICANT CHANGE UP (ref 5–17)
APTT BLD: 32.2 SEC — SIGNIFICANT CHANGE UP (ref 24.5–35.6)
AST SERPL-CCNC: 145 U/L — HIGH (ref 10–40)
BILIRUB SERPL-MCNC: 12.5 MG/DL — HIGH (ref 0.2–1.2)
BUN SERPL-MCNC: 20 MG/DL — HIGH (ref 7–18)
CALCIUM SERPL-MCNC: 9 MG/DL — SIGNIFICANT CHANGE UP (ref 8.4–10.5)
CHLORIDE SERPL-SCNC: 103 MMOL/L — SIGNIFICANT CHANGE UP (ref 96–108)
CMV DNA CSF QL NAA+PROBE: ABNORMAL IU/ML
CMV DNA SPEC NAA+PROBE-LOG#: ABNORMAL LOG10IU/ML
CO2 SERPL-SCNC: 22 MMOL/L — SIGNIFICANT CHANGE UP (ref 22–31)
CREAT SERPL-MCNC: 0.94 MG/DL — SIGNIFICANT CHANGE UP (ref 0.5–1.3)
EBV DNA SERPL NAA+PROBE-ACNC: SIGNIFICANT CHANGE UP IU/ML
EBVPCR LOG: SIGNIFICANT CHANGE UP LOG10IU/ML
EGFR: 92 ML/MIN/1.73M2 — SIGNIFICANT CHANGE UP
GLUCOSE BLDC GLUCOMTR-MCNC: 222 MG/DL — HIGH (ref 70–99)
GLUCOSE BLDC GLUCOMTR-MCNC: 250 MG/DL — HIGH (ref 70–99)
GLUCOSE BLDC GLUCOMTR-MCNC: 344 MG/DL — HIGH (ref 70–99)
GLUCOSE BLDC GLUCOMTR-MCNC: 415 MG/DL — HIGH (ref 70–99)
GLUCOSE BLDC GLUCOMTR-MCNC: 419 MG/DL — HIGH (ref 70–99)
GLUCOSE SERPL-MCNC: 180 MG/DL — HIGH (ref 70–99)
HCT VFR BLD CALC: 25.1 % — LOW (ref 39–50)
HCV RNA FLD QL NAA+PROBE: SIGNIFICANT CHANGE UP
HCV RNA SPEC QL PROBE+SIG AMP: SIGNIFICANT CHANGE UP
HGB BLD-MCNC: 8.9 G/DL — LOW (ref 13–17)
IGG FLD-MCNC: 2101 MG/DL — HIGH (ref 610–1660)
IGG1 SER-MCNC: 1399 MG/DL — HIGH (ref 240–1118)
IGG2 SER-MCNC: 425 MG/DL — SIGNIFICANT CHANGE UP (ref 124–549)
IGG3 SER-MCNC: 47.8 MG/DL — SIGNIFICANT CHANGE UP (ref 15–102)
IGG4 SER-MCNC: 56.7 MG/DL — SIGNIFICANT CHANGE UP (ref 1–123)
INR BLD: 1.89 RATIO — HIGH (ref 0.85–1.16)
LKM AB SER-ACNC: <20.1 UNITS — SIGNIFICANT CHANGE UP (ref 0–20)
MAGNESIUM SERPL-MCNC: 1.9 MG/DL — SIGNIFICANT CHANGE UP (ref 1.6–2.6)
MCHC RBC-ENTMCNC: 33.5 PG — SIGNIFICANT CHANGE UP (ref 27–34)
MCHC RBC-ENTMCNC: 35.5 G/DL — SIGNIFICANT CHANGE UP (ref 32–36)
MCV RBC AUTO: 94.4 FL — SIGNIFICANT CHANGE UP (ref 80–100)
NRBC # BLD: 0 /100 WBCS — SIGNIFICANT CHANGE UP (ref 0–0)
PHOSPHATE SERPL-MCNC: 2.9 MG/DL — SIGNIFICANT CHANGE UP (ref 2.5–4.5)
PLATELET # BLD AUTO: 313 K/UL — SIGNIFICANT CHANGE UP (ref 150–400)
POTASSIUM SERPL-MCNC: 3.5 MMOL/L — SIGNIFICANT CHANGE UP (ref 3.5–5.3)
POTASSIUM SERPL-SCNC: 3.5 MMOL/L — SIGNIFICANT CHANGE UP (ref 3.5–5.3)
PROT SERPL-MCNC: 6.9 G/DL — SIGNIFICANT CHANGE UP (ref 6–8.3)
PROTHROM AB SERPL-ACNC: 21.8 SEC — HIGH (ref 9.9–13.4)
RBC # BLD: 2.66 M/UL — LOW (ref 4.2–5.8)
RBC # FLD: 17.7 % — HIGH (ref 10.3–14.5)
SODIUM SERPL-SCNC: 135 MMOL/L — SIGNIFICANT CHANGE UP (ref 135–145)
WBC # BLD: 14.11 K/UL — HIGH (ref 3.8–10.5)
WBC # FLD AUTO: 14.11 K/UL — HIGH (ref 3.8–10.5)

## 2024-11-11 PROCEDURE — 99232 SBSQ HOSP IP/OBS MODERATE 35: CPT

## 2024-11-11 PROCEDURE — 99497 ADVNCD CARE PLAN 30 MIN: CPT | Mod: 25

## 2024-11-11 PROCEDURE — 99233 SBSQ HOSP IP/OBS HIGH 50: CPT

## 2024-11-11 RX ORDER — GLUCOSAMINE SULFATE DIPOT CHLR 500 MG
1 CAPSULE ORAL
Qty: 0 | Refills: 0 | DISCHARGE
Start: 2024-11-11

## 2024-11-11 RX ORDER — INSULIN GLARGINE 100 [IU]/ML
10 INJECTION, SOLUTION SUBCUTANEOUS AT BEDTIME
Refills: 0 | Status: DISCONTINUED | OUTPATIENT
Start: 2024-11-11 | End: 2024-11-18

## 2024-11-11 RX ORDER — PREDNISONE 20 MG/1
40 TABLET ORAL DAILY
Refills: 0 | Status: DISCONTINUED | OUTPATIENT
Start: 2024-11-11 | End: 2024-11-12

## 2024-11-11 RX ORDER — LANOLIN ALCOHOL/MO/W.PET/CERES
1 CREAM (GRAM) TOPICAL
Qty: 0 | Refills: 0 | DISCHARGE
Start: 2024-11-11

## 2024-11-11 RX ORDER — CYANOCOBALAMIN/FOLIC AC/VIT B6 1-2.2-25MG
1 TABLET ORAL
Qty: 0 | Refills: 0 | DISCHARGE
Start: 2024-11-11

## 2024-11-11 RX ORDER — PANTOPRAZOLE SODIUM 40 MG/1
1 TABLET, DELAYED RELEASE ORAL
Qty: 0 | Refills: 0 | DISCHARGE
Start: 2024-11-11

## 2024-11-11 RX ADMIN — Medication 44.79 GRAM(S): at 22:08

## 2024-11-11 RX ADMIN — Medication 40 MILLIGRAM(S): at 06:03

## 2024-11-11 RX ADMIN — Medication 4 UNIT(S): at 17:24

## 2024-11-11 RX ADMIN — Medication 2 TABLET(S): at 12:31

## 2024-11-11 RX ADMIN — INSULIN GLARGINE 10 UNIT(S): 100 INJECTION, SOLUTION SUBCUTANEOUS at 22:07

## 2024-11-11 RX ADMIN — Medication 2 TABLET(S): at 05:59

## 2024-11-11 RX ADMIN — ENOXAPARIN SODIUM 40 MILLIGRAM(S): 30 INJECTION SUBCUTANEOUS at 18:59

## 2024-11-11 RX ADMIN — PANTOPRAZOLE SODIUM 40 MILLIGRAM(S): 40 TABLET, DELAYED RELEASE ORAL at 06:09

## 2024-11-11 RX ADMIN — Medication 6: at 16:53

## 2024-11-11 RX ADMIN — Medication 1 TABLET(S): at 11:40

## 2024-11-11 RX ADMIN — Medication 100 MILLIGRAM(S): at 11:40

## 2024-11-11 RX ADMIN — Medication 1 MILLIGRAM(S): at 11:43

## 2024-11-11 RX ADMIN — Medication 2: at 07:55

## 2024-11-11 RX ADMIN — AMLODIPINE BESYLATE 10 MILLIGRAM(S): 10 TABLET ORAL at 05:59

## 2024-11-11 RX ADMIN — Medication 4: at 11:39

## 2024-11-11 NOTE — PROGRESS NOTE ADULT - PROBLEM SELECTOR PLAN 7
Na 129 on admission likely iso dehydration due to etoh abuse-RESOLVED  Pedal edema chronic, secondary to amlodipine  - urine studies noted  - Monitor CMP

## 2024-11-11 NOTE — PROGRESS NOTE ADULT - PROBLEM SELECTOR PLAN 6
- CT shows gallstone  - No signs of acute cholecystitis> neg collier sign  - MRCP shows Cholelithiasis and adenomyomatosis. No biliary obstruction.

## 2024-11-11 NOTE — PROGRESS NOTE ADULT - PROBLEM SELECTOR PLAN 1
B/W painless jaundice x 3 days, with darker urine  - Drinks 1 pint vodka per day, last drink on 10/29  - RUQ US: No intrahepatic bile duct dilatation visible; the CBD is not visualized due to the limitations of this exam; Cholelithiasis without any convincing evidence for cholecystitis this time  - CTAP: Cholelithiasis with mild gallbladder wall edema and surrounding pericholecystic fluid/ascites in the setting of cirrhosis. Cholecystitis cannot be excluded  - MRCP did not show biliary obstruction  - Acute viral hepatitis panel neg  - CXR neg, Ascites is trace, unlikely to be sufficient for Dx paracentesis  - started on steroids and NAC for 4 days,   - Lille score today (day #4) predicting poor prognosis  - Declined transfer to [facility] at this time  - Cont Prednisone 40mg daily  - Need Lille score on day #7 again  - Hepatology Dr Gillis following  - If good response with steroids will need to cont OP for total 30days

## 2024-11-11 NOTE — PROGRESS NOTE ADULT - ASSESSMENT
Assessment and Plan:   Mr. Ramirez is a 63 y/o man from home w/ PMH of HTN and ETOH use presents to the hospital w/ 3 days of jaundice.  He had vomiting x1,  2 weeks ago but nothing ongoing. Patient reports that he retired in May'24 and since then he has been drinking 1 pint of vodka every day- his last drink was on Oct 31st. Pt was started on Mounjaro 6 months ago.  Of note, patient came to [facility] ED on Nov 2nd, he was admitted for hyperbilirubinemia but signed out AMA due to an Emergency.    Labs reviewed:                             8.9    14.11 )-----------( [id number]      ( 11 Nov 2024 05:55 )             25.1   11-11    135  |  103  |  20[H]  ----------------------------<  180[H]  3.5   |  22  |  0.94    Ca    9.0      11 Nov 2024 05:55  Phos  2.9     11-11  Mg     1.9     11-11    TPro  6.9  /  Alb  1.9[L]  /  TBili  12.5[H]  /  DBili  x   /  AST  145[H]  /  ALT  77[H]  /  AlkPhos  130[H]  11-11      Imaging reviewed:   CT:   Cirrhotic liver morphology. Enlarged liver, 24 cm in craniocaudal   dimension. Markedly heterogeneous attenuation of the liver parenchyma.   Liver lesions cannot be excluded. Recommend contrast enhanced abdominal   MRI for further evaluation. Correlate with AFP level. Sequelae of portal   hypertension including upper abdominal gastroesophageal and anterior   abdominal varices. Trace to small ascites.    Cholelithiasis with mild gallbladder wall edema and surrounding   pericholecystic fluid/ascites in the setting of cirrhosis. Cholecystitis   cannot be excluded.    3.2 x 3.0 cm abdominal aortic aneurysm. Right common iliac artery is   ectatic measuring 1.6 cm. Follow-up CT abdomen and pelvis is recommended   6 months for reevaluation. Multivessel coronary artery calcifications,   aortic root/valve calcification, and mitral annular calcification.    Enlarged prostate.  I independently reviewed:     Hepatomegaly. Suspect a combination of hepatocellular disease and fatty   liver.  Cholelithiasis and distended gallbladder. Possibility of a stone impacted   in the neck of the gallbladder. No sonographic finding to suggest acute   cholecystitis considering negative Traylor's sign.  Mildly prominent CBD. Consider correlation with MRCP.    Cirrhosis with splenomegaly and portal venous collaterals including the   lower esophageal varices. Trace ascites.  Periportal distribution of hepatic steatosis. No focal hepatic mass   lesion.  Cholelithiasis and adenomyomatosis.  No biliary obstruction.    #Hyperbilirubinemia- suspect obstructive 2/2  impacted GB neck stone vs alcoholic hepatitis  #Liver Cirrhosis  #Hypophosphatemia  #AAA  #HTN  #DVT ppx    Plan:  - Suspect a combination of hepatocellular disease and fatty liver. Cholelithiasis and distended gallbladder. Possibility of a stone impacted  in the neck of the gallbladder. No sonographic finding to suggest acute cholecystitis. No obstruction on MRCP  -Discussed with GI and, -Given ETOH use, there is also a possibility of alcoholic hepatitis. MDF- 57.8- suggestive of poor prognosis.  Hepatologist Dr. Gillis consulted and discussed with her, patient is started on steroids and NAC for 4 days, Lille score day 4 showing poor prognosis, plan for transfer to [place], accepted by Dr. Chapin (liver service), I called transfer center regarding this.  -Follow hepatitis panel, rafi, anti-smooth muscle Ab, antimitochondrial ab, AFP , leptospira Ab  -f/u blood cx, UA unremarkable, dc abx  -No concern of nursing home given baseline mental status  -Given cirrhosis, patient would also need EGD for EV screening.  -BP stable, cont amlodipine  for now. Hold valsartan given latrell. baseline Scr unknown  -C/w Lovenox SC .    Assessment and Plan:   Mr. Ramirez is a 63 y/o man from home w/ PMH of HTN and ETOH use presents to the hospital w/ 3 days of jaundice.  He had vomiting x1,  2 weeks ago but nothing ongoing. Patient reports that he retired in May'24 and since then he has been drinking 1 pint of vodka every day- his last drink was on Oct 31st. Pt was started on Mounjaro 6 months ago.  Of note, patient came to [facility] ED on Nov 2nd, he was admitted for hyperbilirubinemia but signed out AMA due to an Emergency.    Labs reviewed:                             8.9    14.11 )-----------( 313      ( 11 Nov 2024 05:55 )             25.1   11-11    135  |  103  |  20[H]  ----------------------------<  180[H]  3.5   |  22  |  0.94    Ca    9.0      11 Nov 2024 05:55  Phos  2.9     11-11  Mg     1.9     11-11    TPro  6.9  /  Alb  1.9[L]  /  TBili  12.5[H]  /  DBili  x   /  AST  145[H]  /  ALT  77[H]  /  AlkPhos  130[H]  11-11      Imaging reviewed:   CT:   Cirrhotic liver morphology. Enlarged liver, 24 cm in craniocaudal   dimension. Markedly heterogeneous attenuation of the liver parenchyma.   Liver lesions cannot be excluded. Recommend contrast enhanced abdominal   MRI for further evaluation. Correlate with AFP level. Sequelae of portal   hypertension including upper abdominal gastroesophageal and anterior   abdominal varices. Trace to small ascites.    Cholelithiasis with mild gallbladder wall edema and surrounding   pericholecystic fluid/ascites in the setting of cirrhosis. Cholecystitis   cannot be excluded.    3.2 x 3.0 cm abdominal aortic aneurysm. Right common iliac artery is   ectatic measuring 1.6 cm. Follow-up CT abdomen and pelvis is recommended   6 months for reevaluation. Multivessel coronary artery calcifications,   aortic root/valve calcification, and mitral annular calcification.    Enlarged prostate.  I independently reviewed:     Hepatomegaly. Suspect a combination of hepatocellular disease and fatty   liver.  Cholelithiasis and distended gallbladder. Possibility of a stone impacted   in the neck of the gallbladder. No sonographic finding to suggest acute   cholecystitis considering negative Traylor's sign.  Mildly prominent CBD. Consider correlation with MRCP.    Cirrhosis with splenomegaly and portal venous collaterals including the   lower esophageal varices. Trace ascites.  Periportal distribution of hepatic steatosis. No focal hepatic mass   lesion.  Cholelithiasis and adenomyomatosis.  No biliary obstruction.    #Painless Jaundice  #Hyperbilirubinemia suspected alcoholic hepatitis  #Hepatomegaly  #Liver Cirrhosis  #Hypophosphatemia  #AAA  #HTN  #DVT ppx    Plan:  - Suspect a combination of hepatocellular disease and fatty liver. Cholelithiasis and distended gallbladder.  No sonographic finding to suggest acute cholecystitis. No obstruction on MRCP  -Discussed with GI and, -Given ETOH use, there is also a possibility of alcoholic hepatitis. MDF- 57.8- suggestive of poor prognosis.  Hepatologist Dr. Gillis consulted and discussed with her, patient is started on steroids and NAC for 4 days, Lille score day 4 showing poor prognosis, plan for transfer to [place], accepted by Dr. Chapin (liver service), I called transfer center regarding this. However now patient declines transfer.  Per Dr. Gillis: " Lille score would need to be checked at day #7 again. While his bilirubin improved, based on Lille #4 still comes as poor prognosis (might be skewed b/o age too). C/w prednisolone 40 mg for now, and if by day#7 good prognosis, c/w outpatient for total 28 days. He is completing NAC today. "  -Follow hepatitis panel, rafi, anti-smooth muscle Ab, antimitochondrial ab, AFP , leptospira Ab  -f/u blood cx, UA unremarkable, dc abx  -No concern of halfway given baseline mental status  -Given cirrhosis, patient would also need EGD for EV screening.  -BP stable, cont amlodipine  for now. Hold valsartan given latrell. baseline Scr unknown  -C/w Lovenox SC .

## 2024-11-11 NOTE — PROGRESS NOTE ADULT - SUBJECTIVE AND OBJECTIVE BOX
[   ] ICU                                          [   ] CCU                                      [ X  ] Medical Floor    Patient is a 64 year old male with jaundice. GI consulted to evaluate.         64 year old male with past medical history significant for HTN, DM, ETOH abuse, Hyperlipidemia, presented to the emergency room with 3 days history of jaundice associated with non bloody vomiting and dark color urine. Patient reported drinking ETOH heavily over past 6 months (1 pint vodka daily). Patient denies abdominal pain, hematemesis, hematochezia, melena, fever, chills, chest pain, SOB, cough, hematuria, dysuria or diarrhea.     Patient appears comfortable. No new complaints reported, No abdominal pain, N/V, hematemesis, hematochezia, melena, fever, chills, chest pain, SOB, cough or diarrhea reported.      PAIN MANAGEMENT:  Pain Scale:                0 /10  Pain Location:         PAST MEDICAL HISTORY    HTN (hypertension)    Hyperlipidemia     Diabetes mellitus    ETOH abuse        PAST SURGICAL HISTORY    No significant surgical history reported        Allergies    No Known Allergies    Intolerances  None         SOCIAL HISTORY  Advanced Directives:       [ X ] Full Code       [  ] DNR  Marital Status:         [  ] M      [ X ] S      [  ] D       [  ] W  Children:       [ X ] Yes      [  ] No  Occupation:        [  ] Employed       [ X ] Unemployed       [  ] Retired  Diet:       [ X ] Regular       [  ] PEG feeding          [  ] NG tube feeding  Drug Use:           [ X ] Patient denied          [  ] Yes  Alcohol:           [  ] No             [  ] Yes (socially)         [ X ] Yes (chronic)  Tobacco:           [  ] Yes           [ X ] No      FAMILY HISTORY  [ X ] Heart Disease            [ X ] Diabetes             [ X ] HTN             [  ] Colon Cancer             [  ] Stomach Cancer              [  ] Pancreatic Cancer        VITALS   Vital Signs Last 24 Hrs  T(C): 36.8 (11-11-24 @ 04:58), Max: 37.2 (11-10-24 @ 12:31)  T(F): 98.2 (11-11-24 @ 04:58), Max: 99 (11-10-24 @ 12:31)  HR: 97 (11-11-24 @ 04:58) (93 - 105)  BP: 134/74 (11-11-24 @ 04:58) (131/70 - 134/74)  BP(mean): 93 (11-10-24 @ 21:18) (93 - 93)  RR: 18 (11-11-24 @ 04:58) (18 - 18)  SpO2: 100% (11-11-24 @ 04:58) (97% - 100%)        MEDICATIONS  (STANDING):  acetylcysteine IVPB 15 Gram(s) IV Intermittent every 24 hours  amLODIPine   Tablet 10 milliGRAM(s) Oral daily  enoxaparin Injectable 40 milliGRAM(s) SubCutaneous every 24 hours  folic acid 1 milliGRAM(s) Oral daily  influenza   Vaccine 0.5 milliLiter(s) IntraMuscular once  insulin lispro (ADMELOG) corrective regimen sliding scale   SubCutaneous three times a day before meals  insulin lispro (ADMELOG) corrective regimen sliding scale   SubCutaneous at bedtime  multivitamin 1 Tablet(s) Oral daily  pantoprazole    Tablet 40 milliGRAM(s) Oral before breakfast  potassium phosphate / sodium phosphate Tablet (K-PHOS No. 2) 2 Tablet(s) Oral every 6 hours  thiamine 100 milliGRAM(s) Oral daily    MEDICATIONS  (PRN):  aluminum hydroxide/magnesium hydroxide/simethicone Suspension 30 milliLiter(s) Oral every 6 hours PRN Dyspepsia                            8.9    14.11 )-----------( 313      ( 11 Nov 2024 05:55 )             25.1       11-11    135  |  103  |  20[H]  ----------------------------<  180[H]  3.5   |  22  |  0.94    Ca    9.0      11 Nov 2024 05:55  Phos  2.9     11-11  Mg     1.9     11-11    TPro  6.9  /  Alb  1.9[L]  /  TBili  12.5[H]  /  DBili  x   /  AST  145[H]  /  ALT  77[H]  /  AlkPhos  130[H]  11-11      PT/INR - ( 11 Nov 2024 05:55 )   PT: 21.8 sec;   INR: 1.89 ratio         PTT - ( 11 Nov 2024 05:55 )  PTT:32.2 sec

## 2024-11-11 NOTE — PROGRESS NOTE ADULT - SUBJECTIVE AND OBJECTIVE BOX
CC: jaundice  S:  denies N/V  O:  Vital Signs Last 24 Hrs  T(C): 36.8 (11-11-24 @ 04:58), Max: 37.2 (11-10-24 @ 12:31)  T(F): 98.2 (11-11-24 @ 04:58), Max: 99 (11-10-24 @ 12:31)  HR: 97 (11-11-24 @ 04:58) (93 - 105)  BP: 134/74 (11-11-24 @ 04:58) (131/70 - 134/74)  BP(mean): 93 (11-10-24 @ 21:18) (93 - 93)  RR: 18 (11-11-24 @ 04:58) (18 - 18)  SpO2: 100% (11-11-24 @ 04:58) (97% - 100%)    PE:  Gen: Oriented, alert, No acute distress Eyes: conjunctivae and lid normal, scleral icterus ENT: nose and throat exam normal CVS: S1, S2, RRR;no murmur, no rubs, no gallops Pulm: Good air exchange, Breath sounds equal bilaterally, no rales rhonchi,  no wheezes Chest: nontender, no chest deformity, chest movement symmetrical Gl: abdomen soft, nontender, nondistended, bowel sounds normoactive, no masses palpated Musk: no msk pain, no joint swelling Skin: jaundice,  no skin lesions, skin turgor normal, warm and well perfused Neuro: Awake, alert,Psych: normal affect, insight

## 2024-11-11 NOTE — PROGRESS NOTE ADULT - PROBLEM SELECTOR PLAN 2
ISO alcoholic cirrhosis  -TBili  12.5[H]  /  DBili  x   /  AST  145[H]  /  ALT  77[H]  /  AlkPhos  130[H]  11-11  -f/u daily CMP  -Avoid hepatotoxic meds

## 2024-11-11 NOTE — PROGRESS NOTE ADULT - SUBJECTIVE AND OBJECTIVE BOX
Chief Complaint:  Patient is a 64y old  Male who presents with a chief complaint of painless jaundice (11 Nov 2024 10:53)      Reason for consult: Jaundice    Interval Events: MELD 3.0 still 27, Lille > 0.45, despite some improvement in bilirubin (17.2->12.5), was accepted to [facility], and while earlier he was on board, he changed his mind, and declined transfer for now. His main reason was that he needs time to arrange his social support ( 2 friends from  and 1 from ) to visit him there.  He agreed to c/w prednisolone and re-assess at day #7.     Hospital Medications:  acetylcysteine IVPB 15 Gram(s) IV Intermittent every 24 hours  aluminum hydroxide/magnesium hydroxide/simethicone Suspension 30 milliLiter(s) Oral every 6 hours PRN  amLODIPine   Tablet 10 milliGRAM(s) Oral daily  enoxaparin Injectable 40 milliGRAM(s) SubCutaneous every 24 hours  folic acid 1 milliGRAM(s) Oral daily  influenza   Vaccine 0.5 milliLiter(s) IntraMuscular once  insulin lispro (ADMELOG) corrective regimen sliding scale   SubCutaneous three times a day before meals  insulin lispro (ADMELOG) corrective regimen sliding scale   SubCutaneous at bedtime  multivitamin 1 Tablet(s) Oral daily  pantoprazole    Tablet 40 milliGRAM(s) Oral before breakfast  potassium phosphate / sodium phosphate Tablet (K-PHOS No. 2) 2 Tablet(s) Oral every 6 hours  predniSONE   Tablet 40 milliGRAM(s) Oral daily  thiamine 100 milliGRAM(s) Oral daily      ROS:   General:  No  fevers, chills, night sweats, fatigue  Eyes:  Good vision, no reported pain  ENT:  No sore throat, pain, runny nose  CV:  No pain, palpitations  Pulm:  No dyspnea, cough  GI:  See interval events, otherwise negative  :  No  dysuria  Muscle:  No pain, weakness  Neuro:  No memory problems  Skin:  No rash    PHYSICAL EXAM:   Vital Signs:  Vital Signs Last 24 Hrs  T(C): 36.8 (11 Nov 2024 04:58), Max: 37.2 (10 Nov 2024 12:31)  T(F): 98.2 (11 Nov 2024 04:58), Max: 99 (10 Nov 2024 12:31)  HR: 97 (11 Nov 2024 04:58) (93 - 105)  BP: 134/74 (11 Nov 2024 04:58) (131/70 - 134/74)  BP(mean): 93 (10 Nov 2024 21:18) (93 - 93)  RR: 18 (11 Nov 2024 04:58) (18 - 18)  SpO2: 100% (11 Nov 2024 04:58) (97% - 100%)    Parameters below as of 11 Nov 2024 04:58  Patient On (Oxygen Delivery Method): room air      Daily     Daily   GENERAL: no acute distress  NEURO: AAOx3, no asterixis  HEENT: icteric sclera, no conjunctival pallor appreciated  CHEST: no respiratory distress, no accessory muscle use  CARDIAC: regular rate, rhythm  ABDOMEN: soft, non-tender, mildly distended, no rebound or guarding, BS+  EXTREMITIES: warm, well perfused, no edema  SKIN: jaundice    LABS: reviewed                        8.9    14.11 )-----------( 313      ( 11 Nov 2024 05:55 )             25.1     11-11    135  |  103  |  20[H]  ----------------------------<  180[H]  3.5   |  22  |  0.94    Ca    9.0      11 Nov 2024 05:55  Phos  2.9     11-11  Mg     1.9     11-11    TPro  6.9  /  Alb  1.9[L]  /  TBili  12.5[H]  /  DBili  x   /  AST  145[H]  /  ALT  77[H]  /  AlkPhos  130[H]  11-11    LIVER FUNCTIONS - ( 11 Nov 2024 05:55 )  Alb: 1.9 g/dL / Pro: 6.9 g/dL / ALK PHOS: 130 U/L / ALT: 77 U/L DA / AST: 145 U/L / GGT: x             Interval Diagnostic Studies: see sunrise for full report

## 2024-11-11 NOTE — PROGRESS NOTE ADULT - ASSESSMENT
64 Y M H/O HTN, HLD, DM, alcohol use disorder, presents with new onset painless jaundice. CT and US showing new cirrhosis, varices, gallstones, and could not r/o cholecystitis. Admitted for further evaluation.  S/P MRCP showing no biliary obstruction. Hepatology & GI consulted.   Hepatology started on steroids and NAC for 4 days, will need Lille score by day 4. monitor steroids for toxicity, monitor blood glucose level closely    11/11-Pt. was accepted to [facility] for evaluation/w/u for possible liver transplant ISO slow response to steroids based on Lille score.  Pt. declined transfer at this time, would like to consult with his family and also to wait for day #7 of steroids and repeat Lille score.   Pt.  was started on prednisolone 40 mg /day and NAC  (liver failure protocol) on 11/7.  Lille score on day #4 shows slow response.  Pt. was restarted on prednisone, Lille score would need to be checked at day #7 again.   Per hepatology,  if by day#7 good prognosis, c/w outpatient prednisone for total 28 days. He is completing NAC today.

## 2024-11-11 NOTE — PROGRESS NOTE ADULT - ASSESSMENT
63 yo overweight (BMI 29) Male originally from [facility] w/ Hx of  HTN, HLD, DM, alcohol use disorder (socially for years, heavily since 5/2024 since his snf, 1 pint of Vodka per day, w/o Hx of withdrawal or alcohol related hospitalization), presented to [facility]  with new onset painless jaundice initially on 11/2/24, subsequently signed out AMA, and returned to [facility] on 11/4/24. He noticed the jaundice for short time, but has been noticing gradual darkening of his urine since 8/2024.   CT a/p w/ iv 11/2/24 showed enlarged liver (24 cm) w/ cirrhotic morphology, markedly heterogenous liver parenchyma, signs of portal hypertension (varices) cholelithiasis w/ mild GB wall thickening (reported that cholecystitis could not be excluded), and aortic aneurysm.   Subsequent MR/MRCP w/wo 11/5/24 showed enlarged, cirrhotic liver w/ portal hypertension (splenomegaly, varices, trace ascites), cholelithiasis and adenomyomatosis of GB, but no biliary obstruction.   He was started on prednisolone and NAC on 11/7 for suspected severe AH after infection was r/o.      Jaundice  Hepatomegaly  Cirrhosis   Portal hypertension  Suspected alcoholic hepatitis (AH) superimposed to cirrhosis (MetALD)  Trace ascites  TODD - resolved  Cholelithiasis  Elevated lipase (no abdominal pain, no imaging finding of pancreatitis)    - MRCP did not show obstruction  - No portal  or hepatic vein thrombosis reported  - W/u so far: Acute viral hepatitis panel neg, SMA, AMA neg, leptospirosis serology neg  - BCx neg x2, CXR neg, Ascites is trace, unlikely to be sufficient for Dx paracentesis (no abdominal pain)  - Noted pos UA w/o symptoms, and subsequent UCx was neg (per chart it was obtained before antibiotics given), got 1 dose ceftriaxone, was d/c per primary team  - Please, still obtain / follow up rest of viral and AI workup, including HBcAb, HBsAb, HBcAb IgM, HCV RNA, Hep E IgM/PCR, EBV/CMV/HSV/VZV PCRs, CYNTHIA,  LKM, Ig panel, ceruloplasmin, iron studies/ferritin, A1AT, as well as Quantiferon, Stronyloides  - Monitor MELD labs, MELD 3.0 28->    Was started on prednisolone 40 mg /day and NAC  (liver failure protocol) on 11/7.  Lille score would need to be checked at day #7 again. While his bilirubin improved, based on Lille #4 still comes as poor prognosis (might be skewed b/o age too). C/w prednisolone 40 mg for now, and if by day#7 good prognosis, c/w outpatient for total 28 days. He is completing NAC today.   --- after infectious w/u as above  --- after TODD resolved  --- Noted elevated lipase, but no abdominal pain and no definite pancreatitis on CT (was reviewed with radiology), although mild peripancreatic fluid that could be due to portal hypertension as well.  There was no sign of pancreatic necrosis, evolving pancreatitis.   ---Was discussed AH clinical trial with patient vs. the steroid trial. Patient opted for the steroid trial.   - Given high MELD, especially if will be non-responder to steroid, was discussed w/ [facility] transplant hepatology. He was accepted for transfer but patient changed his mind, and declined transfer for now. Had extensive discussion about the severity of his disease.   - TTE reviewed.   - Could consider HIDA, although might be altered by liver dysfunction.  Neg Traylor, no abdominal pain.   - Reports that his wife lives in , but visiting till 11/15 and he will discuss with her if she can stay longer. They have a12 yr old daughter. He also has a roommate and a friend who can reportedly help him.      AUD  - C/w folic, thiamine  - CIWA per primary team  - SW involvement for rehab      Thank you for consult  Hepatology will follow  Was d/w primary team, [facility] transplant hepatology attending

## 2024-11-11 NOTE — PROGRESS NOTE ADULT - SUBJECTIVE AND OBJECTIVE BOX
NP Note discussed with  Primary Attending    Patient is a 64y old  Male who presents with a chief complaint of painless jaundice (11 Nov 2024 11:58).  Pt. seen at bedside, appears comfortable .      INTERVAL HPI/OVERNIGHT EVENTS: no new complaints    MEDICATIONS  (STANDING):  acetylcysteine IVPB 15 Gram(s) IV Intermittent every 24 hours  amLODIPine   Tablet 10 milliGRAM(s) Oral daily  enoxaparin Injectable 40 milliGRAM(s) SubCutaneous every 24 hours  folic acid 1 milliGRAM(s) Oral daily  influenza   Vaccine 0.5 milliLiter(s) IntraMuscular once  insulin lispro (ADMELOG) corrective regimen sliding scale   SubCutaneous three times a day before meals  insulin lispro (ADMELOG) corrective regimen sliding scale   SubCutaneous at bedtime  multivitamin 1 Tablet(s) Oral daily  pantoprazole    Tablet 40 milliGRAM(s) Oral before breakfast  predniSONE   Tablet 40 milliGRAM(s) Oral daily  thiamine 100 milliGRAM(s) Oral daily    MEDICATIONS  (PRN):  aluminum hydroxide/magnesium hydroxide/simethicone Suspension 30 milliLiter(s) Oral every 6 hours PRN Dyspepsia      __________________________________________________  REVIEW OF SYSTEMS:    CONSTITUTIONAL: No fever,   EYES: no acute visual disturbances  NECK: No pain or stiffness  RESPIRATORY: No cough; No shortness of breath  CARDIOVASCULAR: No chest pain, no palpitations  GASTROINTESTINAL: No pain. No nausea or vomiting; No diarrhea   NEUROLOGICAL: No headache or numbness, no tremors  MUSCULOSKELETAL: No joint pain, no muscle pain  GENITOURINARY: no dysuria, no frequency, no hesitancy  PSYCHIATRY: no depression , no anxiety  ALL OTHER  ROS negative        Vital Signs Last 24 Hrs  T(C): 36.8 (11 Nov 2024 13:53), Max: 36.8 (10 Nov 2024 21:18)  T(F): 98.2 (11 Nov 2024 13:53), Max: 98.3 (10 Nov 2024 21:18)  HR: 100 (11 Nov 2024 13:53) (97 - 101)  BP: 124/76 (11 Nov 2024 13:53) (124/76 - 134/74)  BP(mean): 93 (10 Nov 2024 21:18) (93 - 93)  RR: 18 (11 Nov 2024 13:53) (18 - 18)  SpO2: 99% (11 Nov 2024 13:53) (98% - 100%)    Parameters below as of 11 Nov 2024 13:53  Patient On (Oxygen Delivery Method): room air        ________________________________________________  PHYSICAL EXAM:  Obese, pleasant  GENERAL: NAD  HEENT: Normocephalic;  conjunctivae and sclerae clear; moist mucous membranes;   NECK : supple  CHEST/LUNG: Clear to auscultation bilaterally with good air entry   HEART: S1 S2  regular; no murmurs, gallops or rubs  ABDOMEN: Large, Soft, Nontender, Nondistended; Bowel sounds present  EXTREMITIES: no cyanosis; no edema; no calf tenderness  SKIN: jaundiced, warm and dry; no rash  NERVOUS SYSTEM:  Awake and alert; Oriented  to place, person and time ; no new deficits    _________________________________________________  LABS:                        8.9    14.11 )-----------( 313      ( 11 Nov 2024 05:55 )             25.1     11-11    135  |  103  |  20[H]  ----------------------------<  180[H]  3.5   |  22  |  0.94    Ca    9.0      11 Nov 2024 05:55  Phos  2.9     11-11  Mg     1.9     11-11    TPro  6.9  /  Alb  1.9[L]  /  TBili  12.5[H]  /  DBili  x   /  AST  145[H]  /  ALT  77[H]  /  AlkPhos  130[H]  11-11    PT/INR - ( 11 Nov 2024 05:55 )   PT: 21.8 sec;   INR: 1.89 ratio         PTT - ( 11 Nov 2024 05:55 )  PTT:32.2 sec  Urinalysis Basic - ( 11 Nov 2024 05:55 )    Color: x / Appearance: x / SG: x / pH: x  Gluc: 180 mg/dL / Ketone: x  / Bili: x / Urobili: x   Blood: x / Protein: x / Nitrite: x   Leuk Esterase: x / RBC: x / WBC x   Sq Epi: x / Non Sq Epi: x / Bacteria: x      CAPILLARY BLOOD GLUCOSE      POCT Blood Glucose.: 344 mg/dL (11 Nov 2024 11:18)  POCT Blood Glucose.: 222 mg/dL (11 Nov 2024 07:43)  POCT Blood Glucose.: 293 mg/dL (10 Nov 2024 21:15)  POCT Blood Glucose.: 365 mg/dL (10 Nov 2024 16:26)      Specimen Source: Clean Catch Clean Catch (Midstream) (11.05.24 @ 09:20)      RADIOLOGY & ADDITIONAL TESTS:    < from: MR MRCP w/wo IV Cont (11.05.24 @ 14:28) >    ACC: [id number] EXAM:  MR MRCP WAW IC   ORDERED BY: MUSA POOL     PROCEDURE DATE:  11/05/2024          INTERPRETATION:  CLINICAL INFORMATION: Rule out biliary obstruction.   Jaundice.    COMPARISON: CT dated 11/02/2024.    CONTRAST/COMPLICATIONS:  IV Contrast: Gadavist  7.5 cc administered   0 cc discarded  Oral Contrast: NONE  Complications: None reported at time of study completion    PROCEDURE:  MRI of the abdomen was performed.  MRCP was performed.    FINDINGS:  LOWER CHEST: Trace atelectasis right lung base.    LIVER: Enlarged measuring approximately 24 cm. Nodular contour. Fatty   change in the periportal distribution. No evidence of any arterial phase   hyperenhancing mass to suggest HCC.  BILE DUCTS: Normal caliber.  GALLBLADDER: Combination of cholelithiasis and adenomyomatosis.  SPLEEN: Enlarged measuring approximately 14.5 cm.  PANCREAS: Within normal limits.  ADRENALS: Within normal limits.  KIDNEYS/URETERS: Within normal limits.    VISUALIZED PORTIONS:  BOWEL: Within normallimits.  PERITONEUM: Trace ascites and mesenteric edema.  VESSELS: Portal venous collaterals including lower esophageal varices.  RETROPERITONEUM/LYMPH NODES: No lymphadenopathy.  ABDOMINAL WALL: Within normal limits.  BONES: Within normal limits.    IMPRESSION:  Cirrhosis with splenomegaly and portal venous collaterals including the   lower esophageal varices. Trace ascites.  Periportal distribution of hepatic steatosis. No focal hepatic mass   lesion.  Cholelithiasis and adenomyomatosis.  No biliary obstruction.    < end of copied text >    < from: Xray Chest 1 View- PORTABLE-Urgent (Xray Chest 1 View- PORTABLE-Urgent .) (11.04.24 @ 19:32) >    ACC: [id number] EXAM:  XR CHEST PORTABLE URGENT 1V   ORDERED BY: MUSA POOL     PROCEDURE DATE:  11/04/2024          INTERPRETATION:  Clinical history: 64-year-old male, rule out pneumonia.    Portable view of the chest is correlated with the MRCPof 11/5/2024.    FINDINGS: Normal cardiac silhouette and normal pulmonary vasculature with   no consolidation, effusion, pneumothorax or acute osseous findings.    IMPRESSION:  No acute radiographic findings, in particular no pneumonia    --- End of Report ---    < end of copied text >    < from: US Abdomen Upper Quadrant Right (11.04.24 @ 11:34) >    ACC: [id number] EXAM:  US ABDOMEN RT UPR QUADRANT   ORDERED BY:  MAX LAZARUS     PROCEDURE DATE:  11/04/2024          INTERPRETATION:  CLINICAL INFORMATION: Jaundice.    COMPARISON: 11/02/2024.    TECHNIQUE: Sonography of the right upper quadrant.    FINDINGS:  Liver: Increased echogenicity. Nodular contour. These findings suggest a   combination of hepatocellular disease and steatosis. The liver is   enlarged measuring approximately 21 cm. No focal hepatic mass lesion was   identified.  Bile ducts: Mildly prominent. Common bile duct measures 8 mm.  Gallbladder: Cholelithiasis. Distended gallbladder. Possibility of a 1.8   cm sized stone impacted in the neck of the gallbladder. Trace nonspecific   pericholecystic fluid. Negative Traylor's sign.  Pancreas: Poorly visualized.  Right kidney: 10.6 cm. No hydronephrosis.  Ascites: None.  IVC: Visualized portions are within normal limits.    IMPRESSION:  Hepatomegaly. Suspect a combination of hepatocellular disease and fatty   liver.  Cholelithiasis and distended gallbladder. Possibility of a stone impacted   in the neck of the gallbladder. No sonographic finding to suggest acute   cholecystitis considering negative Traylor's sign.  Mildly prominent CBD. Consider correlation with MRCP.    < end of copied text >    < from: US Abdomen Upper Quadrant Right (11.02.24 @ 13:59) >    ACC: [id number] EXAM:  US ABDOMEN RT UPR QUADRANT   ORDERED BY: FREDDIE MOCK     PROCEDURE DATE:  11/02/2024          INTERPRETATION:  CLINICAL INFORMATION: Painless jaundice, evaluate right   upper quadrant    COMPARISON: None available.    TECHNIQUE: Sonography of the right upper quadrant.    FINDINGS:  Liver: The liver is approximately 23 cm in length and demonstrates   increased echogenicity with lobulated contours suggesting presence of   chronic liver disease with hepatic steatosis; findings are likely in the   context of fibrosis/cirrhosis. No large liver lesions identified; small   lesions cannot be entirely excluded. The superior portions of the liver   are obscured by the overlying bowel gas.  Bile ducts: The common bile duct isnot visualized..  Gallbladder: Multiple gallstones and sludge are visible with minimally   prominent gallbladder wall which is most likely due to the underlying   liver disease and contracted status. No pericholecystic fluid visible..   The largest gallstone is approximately 1.6 cm.  Pancreas: Not visualized..  Right kidney: 11.6 cm. No hydronephrosis.  Ascites: None.  IVC: Visualized portions are within normal limits.    IMPRESSION:  Severely limited evaluation due to the body habitus of the patient and   overlying bowel gas.    Hepatomegaly with increased echogenicity and lobulated contours of the   liver suggest fibrosis/cirrhosis.    No intrahepatic bile duct dilatation visible; the CBD is not visualized   due to the limitations of this exam.    Cholelithiasis without any convincing evidence for cholecystitis this   time.    < end of copied text >      Imaging Personally Reviewed:  YES/NO    Consultant(s) Notes Reviewed:   YES/ No    Care Discussed with Consultants :     Plan of care was discussed with patient and /or primary care giver; all questions and concerns were addressed and care was aligned with patient's wishes.

## 2024-11-11 NOTE — PROGRESS NOTE ADULT - CONVERSATION DETAILS
Explained in length the risks and benefits of cardiopulmonary resuscitative measures including CPR, shock, pressors and invasive ventilation relating to artificial life support. Also explained the difference between artificial life prolonging measures (including artificial nutrition) to extend life and the burden/suffering/complications vs natural death and supportive/conservative interventions to maximize quality and quantity of life without causing significant burden and suffering to the patient and caregivers. Patient would like to be full code status and wants treatment for his cirrhosis.  He also would like her friend/PCP Dr. Chyna Sibley to make decisions for him if he is unable to do so.

## 2024-11-11 NOTE — PROGRESS NOTE ADULT - PROBLEM SELECTOR PLAN 5
Drinks 1 pint vodka since 5/2024 ([facility]), reports social drinking prior to [facility]  Last drink on 10/29  No s/s etoh withdrawal  Monitor for [facility]  Social work consulted

## 2024-11-12 LAB
ALBUMIN SERPL ELPH-MCNC: 2 G/DL — LOW (ref 3.5–5)
ALP SERPL-CCNC: 130 U/L — HIGH (ref 40–120)
ALT FLD-CCNC: 80 U/L DA — HIGH (ref 10–60)
ANA TITR SER: NEGATIVE — SIGNIFICANT CHANGE UP
ANION GAP SERPL CALC-SCNC: 9 MMOL/L — SIGNIFICANT CHANGE UP (ref 5–17)
AST SERPL-CCNC: 111 U/L — HIGH (ref 10–40)
BILIRUB SERPL-MCNC: 12.1 MG/DL — HIGH (ref 0.2–1.2)
BUN SERPL-MCNC: 20 MG/DL — HIGH (ref 7–18)
CALCIUM SERPL-MCNC: 8.8 MG/DL — SIGNIFICANT CHANGE UP (ref 8.4–10.5)
CHLORIDE SERPL-SCNC: 103 MMOL/L — SIGNIFICANT CHANGE UP (ref 96–108)
CO2 SERPL-SCNC: 22 MMOL/L — SIGNIFICANT CHANGE UP (ref 22–31)
CREAT SERPL-MCNC: 1.01 MG/DL — SIGNIFICANT CHANGE UP (ref 0.5–1.3)
EGFR: 84 ML/MIN/1.73M2 — SIGNIFICANT CHANGE UP
GLUCOSE BLDC GLUCOMTR-MCNC: 218 MG/DL — HIGH (ref 70–99)
GLUCOSE BLDC GLUCOMTR-MCNC: 291 MG/DL — HIGH (ref 70–99)
GLUCOSE BLDC GLUCOMTR-MCNC: 354 MG/DL — HIGH (ref 70–99)
GLUCOSE BLDC GLUCOMTR-MCNC: 385 MG/DL — HIGH (ref 70–99)
GLUCOSE BLDC GLUCOMTR-MCNC: 446 MG/DL — HIGH (ref 70–99)
GLUCOSE SERPL-MCNC: 171 MG/DL — HIGH (ref 70–99)
HCT VFR BLD CALC: 27 % — LOW (ref 39–50)
HEV IGM SER QL: NEGATIVE — SIGNIFICANT CHANGE UP
HGB BLD-MCNC: 9.5 G/DL — LOW (ref 13–17)
MCHC RBC-ENTMCNC: 33.1 PG — SIGNIFICANT CHANGE UP (ref 27–34)
MCHC RBC-ENTMCNC: 35.2 G/DL — SIGNIFICANT CHANGE UP (ref 32–36)
MCV RBC AUTO: 94.1 FL — SIGNIFICANT CHANGE UP (ref 80–100)
NRBC # BLD: 0 /100 WBCS — SIGNIFICANT CHANGE UP (ref 0–0)
PLATELET # BLD AUTO: 307 K/UL — SIGNIFICANT CHANGE UP (ref 150–400)
POTASSIUM SERPL-MCNC: 3.5 MMOL/L — SIGNIFICANT CHANGE UP (ref 3.5–5.3)
POTASSIUM SERPL-SCNC: 3.5 MMOL/L — SIGNIFICANT CHANGE UP (ref 3.5–5.3)
PROT SERPL-MCNC: 7.1 G/DL — SIGNIFICANT CHANGE UP (ref 6–8.3)
RBC # BLD: 2.87 M/UL — LOW (ref 4.2–5.8)
RBC # FLD: 17.6 % — HIGH (ref 10.3–14.5)
SODIUM SERPL-SCNC: 134 MMOL/L — LOW (ref 135–145)
STRONGYLOIDES AB SER-ACNC: NEGATIVE — SIGNIFICANT CHANGE UP
WBC # BLD: 16.68 K/UL — HIGH (ref 3.8–10.5)
WBC # FLD AUTO: 16.68 K/UL — HIGH (ref 3.8–10.5)

## 2024-11-12 PROCEDURE — 99233 SBSQ HOSP IP/OBS HIGH 50: CPT

## 2024-11-12 RX ADMIN — AMLODIPINE BESYLATE 10 MILLIGRAM(S): 10 TABLET ORAL at 05:32

## 2024-11-12 RX ADMIN — Medication 6 UNIT(S): at 16:53

## 2024-11-12 RX ADMIN — Medication 1 MILLIGRAM(S): at 11:43

## 2024-11-12 RX ADMIN — INSULIN GLARGINE 10 UNIT(S): 100 INJECTION, SOLUTION SUBCUTANEOUS at 21:27

## 2024-11-12 RX ADMIN — ENOXAPARIN SODIUM 40 MILLIGRAM(S): 30 INJECTION SUBCUTANEOUS at 18:22

## 2024-11-12 RX ADMIN — Medication 2: at 07:55

## 2024-11-12 RX ADMIN — Medication 1 TABLET(S): at 11:43

## 2024-11-12 RX ADMIN — PANTOPRAZOLE SODIUM 40 MILLIGRAM(S): 40 TABLET, DELAYED RELEASE ORAL at 05:32

## 2024-11-12 RX ADMIN — Medication 100 MILLIGRAM(S): at 11:43

## 2024-11-12 RX ADMIN — PREDNISONE 40 MILLIGRAM(S): 20 TABLET ORAL at 05:32

## 2024-11-12 RX ADMIN — Medication 1: at 21:26

## 2024-11-12 RX ADMIN — Medication 6: at 16:34

## 2024-11-12 RX ADMIN — Medication 5: at 11:43

## 2024-11-12 NOTE — DIETITIAN INITIAL EVALUATION ADULT - ADD RECOMMEND
1) Continue current diet as medically feasible.  2) Encourage PO intake and honor food preferences as able.  3) Monitor PO intake, labs, weights, BM's, and skin integrity.   4) Consider ensure 2 x day

## 2024-11-12 NOTE — PROGRESS NOTE ADULT - PROBLEM SELECTOR PLAN 1
B/W painless jaundice x 3 days, with darker urine  - Drinks 1 pint vodka per day, last drink on 10/29  - RUQ US: No intrahepatic bile duct dilatation visible; the CBD is not visualized due to the limitations of this exam; Cholelithiasis without any convincing evidence for cholecystitis this time  - CTAP: Cholelithiasis with mild gallbladder wall edema and surrounding pericholecystic fluid/ascites in the setting of cirrhosis. Cholecystitis cannot be excluded  - MRCP did not show biliary obstruction  - Acute viral hepatitis panel neg  - CXR neg, Ascites is trace, unlikely to be sufficient for Dx paracentesis  - started on steroids and NAC for 4 days,   - Lille score today (day #4) predicting poor prognosis  - Declined transfer to [facility] at this time  - Cont Prednisone 40mg daily  - Need Lille score on day #7 again  - Hepatology Dr Gillis following  - If good response with steroids will need to cont OP for total 30days

## 2024-11-12 NOTE — PROGRESS NOTE ADULT - PROBLEM SELECTOR PLAN 5
Drinks 1 pint vodka since 5/2024 ([facility]), reports social drinking prior to [facility]  Last drink on 10/29  No s/s etoh withdrawal  Monitor for [facility]  Social work consulted

## 2024-11-12 NOTE — PROGRESS NOTE ADULT - SUBJECTIVE AND OBJECTIVE BOX
NP Note discussed with  Primary Attending    Patient is a 64y old  Male who presents with a chief complaint of Jaundice.  remains comfortable in good spirits..     (12 Nov 2024 10:57)      INTERVAL HPI/OVERNIGHT EVENTS: no new complaints    MEDICATIONS  (STANDING):  amLODIPine   Tablet 10 milliGRAM(s) Oral daily  enoxaparin Injectable 40 milliGRAM(s) SubCutaneous every 24 hours  folic acid 1 milliGRAM(s) Oral daily  influenza   Vaccine 0.5 milliLiter(s) IntraMuscular once  insulin glargine Injectable (LANTUS) 10 Unit(s) SubCutaneous at bedtime  insulin lispro (ADMELOG) corrective regimen sliding scale   SubCutaneous three times a day before meals  insulin lispro (ADMELOG) corrective regimen sliding scale   SubCutaneous at bedtime  multivitamin 1 Tablet(s) Oral daily  pantoprazole    Tablet 40 milliGRAM(s) Oral before breakfast  prednisoLONE    3 mG/mL Solution (ORAPRED) 40 milliGRAM(s) Oral daily  thiamine 100 milliGRAM(s) Oral daily    MEDICATIONS  (PRN):  aluminum hydroxide/magnesium hydroxide/simethicone Suspension 30 milliLiter(s) Oral every 6 hours PRN Dyspepsia      __________________________________________________  REVIEW OF SYSTEMS:    CONSTITUTIONAL: No fever,   EYES: no acute visual disturbances  NECK: No pain or stiffness  RESPIRATORY: No cough; No shortness of breath  CARDIOVASCULAR: No chest pain, no palpitations  GASTROINTESTINAL: No pain. No nausea or vomiting; No diarrhea   NEUROLOGICAL: No headache or numbness, no tremors  MUSCULOSKELETAL: No joint pain, no muscle pain  GENITOURINARY: no dysuria, no frequency, no hesitancy  PSYCHIATRY: no depression , no anxiety  ALL OTHER  ROS negative        Vital Signs Last 24 Hrs  T(C): 36.8 (12 Nov 2024 05:40), Max: 36.8 (11 Nov 2024 13:53)  T(F): 98.3 (12 Nov 2024 05:40), Max: 98.3 (12 Nov 2024 05:40)  HR: 92 (12 Nov 2024 05:40) (91 - 100)  BP: 139/76 (12 Nov 2024 05:40) (124/76 - 139/76)  BP(mean): 96 (11 Nov 2024 20:29) (96 - 96)  RR: 17 (12 Nov 2024 05:40) (17 - 18)  SpO2: 98% (12 Nov 2024 05:40) (98% - 99%)    Parameters below as of 12 Nov 2024 05:40  Patient On (Oxygen Delivery Method): room air        ________________________________________________  PHYSICAL EXAM:  Obese, well groomed  GENERAL: NAD  HEENT: Normocephalic;  conjunctivae and sclerae clear; moist mucous membranes;   NECK : supple  CHEST/LUNG: Clear to auscultation bilaterally with good air entry   HEART: S1 S2  regular; no murmurs, gallops or rubs  ABDOMEN: Soft, Nontender, Nondistended; Bowel sounds present  EXTREMITIES: no cyanosis; no edema; no calf tenderness  SKIN: warm and dry; no rash  NERVOUS SYSTEM:  Awake and alert; Oriented  to place, person and time ; no new deficits    _________________________________________________  LABS:                        9.5    16.68 )-----------( 307      ( 12 Nov 2024 06:15 )             27.0     11-12    134[L]  |  103  |  20[H]  ----------------------------<  171[H]  3.5   |  22  |  1.01    Ca    8.8      12 Nov 2024 06:15  Phos  2.9     11-11  Mg     1.9     11-11    TPro  7.1  /  Alb  2.0[L]  /  TBili  12.1[H]  /  DBili  x   /  AST  111[H]  /  ALT  80[H]  /  AlkPhos  130[H]  11-12    PT/INR - ( 11 Nov 2024 05:55 )   PT: 21.8 sec;   INR: 1.89 ratio         PTT - ( 11 Nov 2024 05:55 )  PTT:32.2 sec  Urinalysis Basic - ( 12 Nov 2024 06:15 )    Color: x / Appearance: x / SG: x / pH: x  Gluc: 171 mg/dL / Ketone: x  / Bili: x / Urobili: x   Blood: x / Protein: x / Nitrite: x   Leuk Esterase: x / RBC: x / WBC x   Sq Epi: x / Non Sq Epi: x / Bacteria: x      CAPILLARY BLOOD GLUCOSE      POCT Blood Glucose.: 385 mg/dL (12 Nov 2024 11:37)  POCT Blood Glucose.: 218 mg/dL (12 Nov 2024 07:33)  POCT Blood Glucose.: 250 mg/dL (11 Nov 2024 21:48)  POCT Blood Glucose.: 415 mg/dL (11 Nov 2024 16:33)  POCT Blood Glucose.: 419 mg/dL (11 Nov 2024 16:32)    RADIOLOGY & ADDITIONAL TESTS:    < from: MR MRCP w/wo IV Cont (11.05.24 @ 14:28) >    ACC: [id number] EXAM:  MR MRCP WAW IC   ORDERED BY: MUSA POOL     PROCEDURE DATE:  11/05/2024          INTERPRETATION:  CLINICAL INFORMATION: Rule out biliary obstruction.   Jaundice.    COMPARISON: CT dated 11/02/2024.    CONTRAST/COMPLICATIONS:  IV Contrast: Gadavist  7.5 cc administered   0 cc discarded  Oral Contrast: NONE  Complications: None reported at time of study completion    PROCEDURE:  MRI of the abdomen was performed.  MRCP was performed.    FINDINGS:  LOWER CHEST: Trace atelectasis right lung base.    LIVER: Enlarged measuring approximately 24 cm. Nodular contour. Fatty   change in the periportal distribution. No evidence of any arterial phase   hyperenhancing mass to suggest HCC.  BILE DUCTS: Normal caliber.  GALLBLADDER: Combination of cholelithiasis and adenomyomatosis.  SPLEEN: Enlarged measuring approximately 14.5 cm.  PANCREAS: Within normal limits.  ADRENALS: Within normal limits.  KIDNEYS/URETERS: Within normal limits.    VISUALIZED PORTIONS:  BOWEL: Within normallimits.  PERITONEUM: Trace ascites and mesenteric edema.  VESSELS: Portal venous collaterals including lower esophageal varices.  RETROPERITONEUM/LYMPH NODES: No lymphadenopathy.  ABDOMINAL WALL: Within normal limits.  BONES: Within normal limits.    IMPRESSION:  Cirrhosis with splenomegaly and portal venous collaterals including the   lower esophageal varices. Trace ascites.  Periportal distribution of hepatic steatosis. No focal hepatic mass   lesion.  Cholelithiasis and adenomyomatosis.  No biliary obstruction.    < end of copied text >    < from: Xray Chest 1 View- PORTABLE-Urgent (Xray Chest 1 View- PORTABLE-Urgent .) (11.04.24 @ 19:32) >    ACC: [id number] EXAM:  XR CHEST PORTABLE URGENT 1V   ORDERED BY: MUSA POOL     PROCEDURE DATE:  11/04/2024          INTERPRETATION:  Clinical history: 64-year-old male, rule out pneumonia.    Portable view of the chest is correlated with the MRCPof 11/5/2024.    FINDINGS: Normal cardiac silhouette and normal pulmonary vasculature with   no consolidation, effusion, pneumothorax or acute osseous findings.    IMPRESSION:  No acute radiographic findings, in particular no pneumonia    --- End of Report ---    < end of copied text >    < from: US Abdomen Upper Quadrant Right (11.04.24 @ 11:34) >    ACC: [id number] EXAM:  US ABDOMEN RT UPR QUADRANT   ORDERED BY:  MAX LAZARUS     PROCEDURE DATE:  11/04/2024          INTERPRETATION:  CLINICAL INFORMATION: Jaundice.    COMPARISON: 11/02/2024.    TECHNIQUE: Sonography of the right upper quadrant.    FINDINGS:  Liver: Increased echogenicity. Nodular contour. These findings suggest a   combination of hepatocellular disease and steatosis. The liver is   enlarged measuring approximately 21 cm. No focal hepatic mass lesion was   identified.  Bile ducts: Mildly prominent. Common bile duct measures 8 mm.  Gallbladder: Cholelithiasis. Distended gallbladder. Possibility of a 1.8   cm sized stone impacted in the neck of the gallbladder. Trace nonspecific   pericholecystic fluid. Negative Traylor's sign.  Pancreas: Poorly visualized.  Right kidney: 10.6 cm. No hydronephrosis.  Ascites: None.  IVC: Visualized portions are within normal limits.    IMPRESSION:  Hepatomegaly. Suspect a combination of hepatocellular disease and fatty   liver.  Cholelithiasis and distended gallbladder. Possibility of a stone impacted   in the neck of the gallbladder. No sonographic finding to suggest acute   cholecystitis considering negative Traylor's sign.  Mildly prominent CBD. Consider correlation with MRCP.    < end of copied text >      Imaging Personally Reviewed:  YES/NO    Consultant(s) Notes Reviewed:   YES/ No    Care Discussed with Consultants :     Plan of care was discussed with patient and /or primary care giver; all questions and concerns were addressed and care was aligned with patient's wishes.

## 2024-11-12 NOTE — DIETITIAN INITIAL EVALUATION ADULT - PERTINENT LABORATORY DATA
11-12    134[L]  |  103  |  20[H]  ----------------------------<  171[H]  3.5   |  22  |  1.01    Ca    8.8      12 Nov 2024 06:15  Phos  2.9     11-11  Mg     1.9     11-11    TPro  7.1  /  Alb  2.0[L]  /  TBili  12.1[H]  /  DBili  x   /  AST  111[H]  /  ALT  80[H]  /  AlkPhos  130[H]  11-12  POCT Blood Glucose.: 218 mg/dL (11-12-24 @ 07:33)  A1C with Estimated Average Glucose Result: 5.1 % (11-05-24 @ 06:50)

## 2024-11-12 NOTE — PROGRESS NOTE ADULT - SUBJECTIVE AND OBJECTIVE BOX
[   ] ICU                                          [   ] CCU                                      [  X ] Medical Floor    Patient is a 64 year old male with jaundice. GI consulted to evaluate.         64 year old male with past medical history significant for HTN, DM, ETOH abuse, Hyperlipidemia, presented to the emergency room with 3 days history of jaundice associated with non bloody vomiting and dark color urine. Patient reported drinking ETOH heavily over past 6 months (1 pint vodka daily). Patient denies abdominal pain, hematemesis, hematochezia, melena, fever, chills, chest pain, SOB, cough, hematuria, dysuria or diarrhea.     Patient appears comfortable. No new complaints reported, No abdominal pain, N/V, hematemesis, hematochezia, melena, fever, chills, chest pain, SOB, cough or diarrhea reported.      PAIN MANAGEMENT:  Pain Scale:                0 /10  Pain Location:         PAST MEDICAL HISTORY    HTN (hypertension)    Hyperlipidemia     Diabetes mellitus    ETOH abuse        PAST SURGICAL HISTORY    No significant surgical history reported        Allergies    No Known Allergies    Intolerances  None         SOCIAL HISTORY  Advanced Directives:       [ X ] Full Code       [  ] DNR  Marital Status:         [  ] M      [ X ] S      [  ] D       [  ] W  Children:       [ X ] Yes      [  ] No  Occupation:        [  ] Employed       [ X ] Unemployed       [  ] Retired  Diet:       [ X ] Regular       [  ] PEG feeding          [  ] NG tube feeding  Drug Use:           [ X ] Patient denied          [  ] Yes  Alcohol:           [  ] No             [  ] Yes (socially)         [ X ] Yes (chronic)  Tobacco:           [  ] Yes           [ X ] No      FAMILY HISTORY  [ X ] Heart Disease            [ X ] Diabetes             [ X ] HTN             [  ] Colon Cancer             [  ] Stomach Cancer              [  ] Pancreatic Cancer        VITALS   Vital Signs Last 24 Hrs  T(C): 36.8 (11-12-24 @ 05:40), Max: 36.8 (11-11-24 @ 13:53)  T(F): 98.3 (11-12-24 @ 05:40), Max: 98.3 (11-12-24 @ 05:40)  HR: 92 (11-12-24 @ 05:40) (91 - 100)  BP: 139/76 (11-12-24 @ 05:40) (124/76 - 139/76)  BP(mean): 96 (11-11-24 @ 20:29) (96 - 96)  RR: 17 (11-12-24 @ 05:40) (17 - 18)  SpO2: 98% (11-12-24 @ 05:40) (98% - 99%)        MEDICATIONS  (STANDING):  amLODIPine   Tablet 10 milliGRAM(s) Oral daily  enoxaparin Injectable 40 milliGRAM(s) SubCutaneous every 24 hours  folic acid 1 milliGRAM(s) Oral daily  influenza   Vaccine 0.5 milliLiter(s) IntraMuscular once  insulin glargine Injectable (LANTUS) 10 Unit(s) SubCutaneous at bedtime  insulin lispro (ADMELOG) corrective regimen sliding scale   SubCutaneous three times a day before meals  insulin lispro (ADMELOG) corrective regimen sliding scale   SubCutaneous at bedtime  multivitamin 1 Tablet(s) Oral daily  pantoprazole    Tablet 40 milliGRAM(s) Oral before breakfast  prednisoLONE    3 mG/mL Solution (ORAPRED) 40 milliGRAM(s) Oral daily  thiamine 100 milliGRAM(s) Oral daily    MEDICATIONS  (PRN):  aluminum hydroxide/magnesium hydroxide/simethicone Suspension 30 milliLiter(s) Oral every 6 hours PRN Dyspepsia                            9.5    16.68 )-----------( 307      ( 12 Nov 2024 06:15 )             27.0       11-12    134[L]  |  103  |  20[H]  ----------------------------<  171[H]  3.5   |  22  |  1.01    Ca    8.8      12 Nov 2024 06:15  Phos  2.9     11-11  Mg     1.9     11-11    TPro  7.1  /  Alb  2.0[L]  /  TBili  12.1[H]  /  DBili  x   /  AST  111[H]  /  ALT  80[H]  /  AlkPhos  130[H]  11-12      PT/INR - ( 11 Nov 2024 05:55 )   PT: 21.8 sec;   INR: 1.89 ratio         PTT - ( 11 Nov 2024 05:55 )  PTT:32.2 sec

## 2024-11-12 NOTE — DIETITIAN INITIAL EVALUATION ADULT - PROBLEM SELECTOR PLAN 1
Differential includes gall stone obstruction, alcohol hepatitis  - Maddrey score 57.8  - Painless jaundice x 3 days, with darker urine  - Drinks 1 pint vodka per day, last drink on 10/29  - RUQ US: No intrahepatic bile duct dilatation visible; the CBD is not visualized due to the limitations of this exam; Cholelithiasis without any convincing evidence for cholecystitis this time  - CTAP: Cholelithiasis with mild gallbladder wall edema and surrounding pericholecystic fluid/ascites in the setting of cirrhosis. Cholecystitis cannot be excluded  - T Bili 18, D Bili 14, , , ALT 45    -GI consulted  - Hep Dr. Hamm consulted   - Needs MRCP  -F/u AMA, smooth muscle Ab, hep panel, AFP  -F/u coags  -For possible alcohol hepatitis, holding off steroids as infection has not been ruled out (F/u UA, CXR, cultures)

## 2024-11-12 NOTE — PROGRESS NOTE ADULT - SUBJECTIVE AND OBJECTIVE BOX
Chief Complaint:  Patient is a 64y old  Male who presents with a chief complaint of painless jaundice (11 Nov 2024 15:20)      Reason for consult: Jaundice    Interval Events: Labs unchanged, bilirubin 12.5->12.1.     Hospital Medications:  aluminum hydroxide/magnesium hydroxide/simethicone Suspension 30 milliLiter(s) Oral every 6 hours PRN  amLODIPine   Tablet 10 milliGRAM(s) Oral daily  enoxaparin Injectable 40 milliGRAM(s) SubCutaneous every 24 hours  folic acid 1 milliGRAM(s) Oral daily  influenza   Vaccine 0.5 milliLiter(s) IntraMuscular once  insulin glargine Injectable (LANTUS) 10 Unit(s) SubCutaneous at bedtime  insulin lispro (ADMELOG) corrective regimen sliding scale   SubCutaneous three times a day before meals  insulin lispro (ADMELOG) corrective regimen sliding scale   SubCutaneous at bedtime  multivitamin 1 Tablet(s) Oral daily  pantoprazole    Tablet 40 milliGRAM(s) Oral before breakfast  predniSONE   Tablet 40 milliGRAM(s) Oral daily  thiamine 100 milliGRAM(s) Oral daily      ROS:   General:  No  fevers, chills, night sweats, fatigue  Eyes:  Good vision, no reported pain  ENT:  No sore throat, pain, runny nose  CV:  No pain, palpitations  Pulm:  No dyspnea, cough  GI:  See interval events, otherwise negative  :  No  dysuria  Muscle:  No pain, weakness  Neuro:  No memory problems  Skin:  No rash        PHYSICAL EXAM:   Vital Signs:  Vital Signs Last 24 Hrs  T(C): 36.8 (12 Nov 2024 05:40), Max: 36.8 (11 Nov 2024 13:53)  T(F): 98.3 (12 Nov 2024 05:40), Max: 98.3 (12 Nov 2024 05:40)  HR: 92 (12 Nov 2024 05:40) (91 - 100)  BP: 139/76 (12 Nov 2024 05:40) (124/76 - 139/76)  BP(mean): 96 (11 Nov 2024 20:29) (96 - 96)  RR: 17 (12 Nov 2024 05:40) (17 - 18)  SpO2: 98% (12 Nov 2024 05:40) (98% - 99%)    Parameters below as of 12 Nov 2024 05:40  Patient On (Oxygen Delivery Method): room air      Daily     Daily     GENERAL: no acute distress  NEURO: AAOx3, no asterixis  HEENT: icteric sclera, no conjunctival pallor appreciated  CHEST: no respiratory distress, no accessory muscle use  CARDIAC: regular rate, rhythm  ABDOMEN: soft, non-tender, mildly distended, no rebound or guarding, BS+  EXTREMITIES: warm, well perfused, no edema  SKIN: jaundice    LABS: reviewed                        9.5    16.68 )-----------( 307      ( 12 Nov 2024 06:15 )             27.0     11-12    134[L]  |  103  |  20[H]  ----------------------------<  171[H]  3.5   |  22  |  1.01    Ca    8.8      12 Nov 2024 06:15  Phos  2.9     11-11  Mg     1.9     11-11    TPro  7.1  /  Alb  2.0[L]  /  TBili  12.1[H]  /  DBili  x   /  AST  111[H]  /  ALT  80[H]  /  AlkPhos  130[H]  11-12    LIVER FUNCTIONS - ( 12 Nov 2024 06:15 )  Alb: 2.0 g/dL / Pro: 7.1 g/dL / ALK PHOS: 130 U/L / ALT: 80 U/L DA / AST: 111 U/L / GGT: x             Interval Diagnostic Studies: see sunrise for full report

## 2024-11-12 NOTE — DIETITIAN INITIAL EVALUATION ADULT - ORAL INTAKE PTA/DIET HISTORY
Spoke to pt at bedside, pt reported no new food allergies or intolerances. Pt does not take any vitamins or supplements at home. Pt's intake was good PTA, pt noted with ETOH abuse drinking 1 pint of vodka a day. Currently receiving multivitamin, thiamine, and folic acid supplementation. Noted with 1 episode of vomiting 1 day PTA. Reported UBW:215-220 lbs, no recent significant weight changes.   Nutrition interview: No recent episodes of nausea, vomiting diarrhea or constipation per pt. Last BM noted on 11/10 per pt. Denies any chewing/swallowing difficulties. Intake is % per pt, consumed all of his breakfast. Food preferences explored and forwarded to dietary. Noted with hyponatremia (134), POCT 128 - 419 between 11/4 - 11/12. Likely steroid induced hyperglycemia. Hba1c - 5.1%.

## 2024-11-12 NOTE — DIETITIAN INITIAL EVALUATION ADULT - PROBLEM SELECTOR PLAN 4
Addended by: WILLAM IGLESIAS on: 9/23/2019 01:44 PM     Modules accepted: Orders     Drinks 1 pint vodka since 5/2024  Last drink on 10/29  Social work consulted

## 2024-11-12 NOTE — PROGRESS NOTE ADULT - ASSESSMENT
63 yo overweight (BMI 29) Male originally from [facility] w/ Hx of  HTN, HLD, DM, alcohol use disorder (socially for years, heavily since 5/2024 since his half-way, 1 pint of Vodka per day, w/o Hx of withdrawal or alcohol related hospitalization), [facility]  with new onset painless jaundice initially on 11/2/24, subsequently signed out AMA, and [facility]. He noticed the jaundice for short time, but has been noticing gradual darkening of his urine since 8/2024.   CT a/p w/ iv 11/2/24 showed enlarged liver (24 cm) w/ cirrhotic morphology, markedly heterogenous liver parenchyma, signs of portal hypertension (varices) cholelithiasis w/ mild GB wall thickening (reported that cholecystitis could not be excluded), and aortic aneurysm.   Subsequent MR/MRCP w/wo 11/5/24 showed enlarged, cirrhotic liver w/ portal hypertension (splenomegaly, varices, trace ascites), cholelithiasis and adenomyomatosis of GB, but no biliary obstruction.   He was started on prednisolone and NAC on 11/7 for suspected severe AH after infection was r/o.      Jaundice  Hepatomegaly  Cirrhosis   Portal hypertension  Suspected alcoholic hepatitis (AH) superimposed to cirrhosis (MetALD)  Trace ascites  TODD - resolved  Cholelithiasis  Elevated lipase (no abdominal pain, no imaging finding of pancreatitis)    - MRCP did not show obstruction  - No portal  or hepatic vein thrombosis reported  - W/u so far: Hep A IgM neg, HBsAg neg, HBcAb IgM neg, HBcAb neg, HBsAb neg, HCV ab/RNA neg, EBV PCR neg, CMV PCR detectable but under the actual detection limit, likely non specific; HSV 1/2 PCR neg; SMA, LKM and AMA neg,IgG elevated (could be due to cirrhosis); leptospirosis serology neg; ceruloplasmin 28; A1AT 242  --- Noted elevated ferritin and iron sat, could be due to EtOH, but please, send HFE mutation  - BCx neg x2, CXR neg, Ascites is trace, unlikely to be sufficient for Dx paracentesis (no abdominal pain)  - Noted pos UA w/o symptoms, and subsequent UCx was neg (per chart it was obtained before antibiotics given), got 1 dose ceftriaxone, was d/c per primary team  - Please, still obtain / follow up rest of viral and AI workup, including Hep E IgM/PCR, VZV PCRs, CYNTHIA,  Ig panel,  as well as QuantiFeron Strongyloides serology  - Monitor MELD labs, MELD 3.0 28->27    Was started on prednisolone 40 mg /day and NAC  (liver failure protocol) on 11/7.  Lille score would need to be checked at day #7 again. While his bilirubin improved, based on Lille #4 still comes as poor prognosis (might be skewed b/o age too). C/w prednisolone 40 mg for now, reassess Lille score at day#7 (Thursday), and if by day#7 good prognosis, c/w outpatient for total 28 days. He is completing NAC today. Agree w/ PPI till on steroid.   --- after infectious w/u as above  --- after TODD resolved  --- Noted elevated lipase, but no abdominal pain and no definite pancreatitis on CT (was reviewed with radiology), although mild peripancreatic fluid that could be due to portal hypertension as well.  There was no sign of pancreatic necrosis, evolving pancreatitis.   ---Was discussed AH clinical trial with patient vs. the steroid trial. Patient opted for the steroid trial.   - Given high MELD, especially if will be non-responder to steroid, was discussed w/ [facility] transplant hepatology. He was accepted for transfer but patient changed his mind, and declined transfer for now. Had extensive discussion about the severity of his disease.   - TTE reviewed.   - Could consider HIDA, although might be altered by liver dysfunction.  Neg Traylor, no abdominal pain.   - Reports that his wife lives in , but visiting till 11/15 and he will discuss with her if she can stay longer. They have a12 yr old daughter. He also has a roommate and a friend who can reportedly help him.   - Would benefit from Hep B vaccine outpatient  - Please, check Hep A immunity (Hep A IgG)     AUD  - C/w folic, thiamine  - CIWA per primary team  - SW involvement for rehab      Thank you for consult  Hepatology will follow  Was d/w primary team

## 2024-11-12 NOTE — DIETITIAN INITIAL EVALUATION ADULT - NS FNS DIET ORDER
Refill Request   Diet, DASH/TLC:   Sodium & Cholesterol Restricted  Consistent Carbohydrate {No Snacks} (11-04-24 @ 18:30) [Active]

## 2024-11-12 NOTE — PROGRESS NOTE ADULT - ASSESSMENT
64 Y M H/O HTN, HLD, DM, alcohol use disorder, presents with new onset painless jaundice. CT and US showing new cirrhosis, varices, gallstones, and could not r/o cholecystitis. Admitted for further evaluation.  S/P MRCP showing no biliary obstruction. Hepatology & GI consulted.   Hepatology started on steroids and NAC for 4 days, will need Lille score by day 4. monitor steroids for toxicity, monitor blood glucose level closely    11/11-Pt. was accepted to [facility] for evaluation/w/u for possible liver transplant ISO slow response to steroids based on Lille score.  Pt. declined transfer at this time, would like to consult with his family and also to wait for day #7 of steroids and repeat Lille score.   Pt.  was started on prednisolone 40 mg /day and NAC  (liver failure protocol) on 11/7.  Lille score on day #4 shows slow response.  Pt. was restarted on prednisone, Lille score would need to be checked at day #7 again.   Per hepatology,  if by day#7 good prognosis, c/w outpatient prednisone for total 28 days. He is completing NAC today.      11/12-Overall clinical presentation unchanged.  Prednisone changed to prednisolone per hepatology recc.  Will monitor Lille score on day #7 (11/14).

## 2024-11-12 NOTE — DIETITIAN INITIAL EVALUATION ADULT - PERTINENT MEDS FT
MEDICATIONS  (STANDING):  amLODIPine   Tablet 10 milliGRAM(s) Oral daily  enoxaparin Injectable 40 milliGRAM(s) SubCutaneous every 24 hours  folic acid 1 milliGRAM(s) Oral daily  influenza   Vaccine 0.5 milliLiter(s) IntraMuscular once  insulin glargine Injectable (LANTUS) 10 Unit(s) SubCutaneous at bedtime  insulin lispro (ADMELOG) corrective regimen sliding scale   SubCutaneous at bedtime  insulin lispro (ADMELOG) corrective regimen sliding scale   SubCutaneous three times a day before meals  multivitamin 1 Tablet(s) Oral daily  pantoprazole    Tablet 40 milliGRAM(s) Oral before breakfast  prednisoLONE    3 mG/mL Solution (ORAPRED) 40 milliGRAM(s) Oral daily  thiamine 100 milliGRAM(s) Oral daily    MEDICATIONS  (PRN):  aluminum hydroxide/magnesium hydroxide/simethicone Suspension 30 milliLiter(s) Oral every 6 hours PRN Dyspepsia

## 2024-11-12 NOTE — PROGRESS NOTE ADULT - PROBLEM SELECTOR PLAN 9
Home med: [employer]  Started ISS  A1c 5.1  Persistent hyperglycemia ISO steroids, started Lantus 10U HS for now  Cont FS AC&HS

## 2024-11-12 NOTE — PROGRESS NOTE ADULT - ASSESSMENT
1. Painless jaundice  2. Transaminitis  3. Alcoholic cirrhosis  4. Portal HTN  5. Paraesophageal/perigastric varices  6. Cholelithiasis with gallbladder wall edema and pericholecystic fluid  7. Cholecystitis less likely  8. Ascites  9. ETOH abuse  10. Anemia  11. No evidence of acute GI bleeding  12. S/p MRCP  13. No extrahepatic biliary obstruction    Suggestions:    1. Follow up LFT's  2. Continue antibiotics   3. Continue Steroid therapy as per hepatology  4. B-blocker therapy  5. Lactulose daily  6. Protonix 40mg daily  7. DT's precaution  8. Hepatology follow up  9. Monitor H/H  10. Transfuse PRBC as needed  11. EGD  12. Check AFP  13. Diagnostic and therapeutic paracentesis  14. Thiamine / Folate / MVI  15. DVT prophylaxis

## 2024-11-13 LAB
ALBUMIN SERPL ELPH-MCNC: 2 G/DL — LOW (ref 3.5–5)
ALP SERPL-CCNC: 132 U/L — HIGH (ref 40–120)
ALT FLD-CCNC: 83 U/L DA — HIGH (ref 10–60)
ANION GAP SERPL CALC-SCNC: 9 MMOL/L — SIGNIFICANT CHANGE UP (ref 5–17)
AST SERPL-CCNC: 118 U/L — HIGH (ref 10–40)
BILIRUB SERPL-MCNC: 11.4 MG/DL — HIGH (ref 0.2–1.2)
BUN SERPL-MCNC: 21 MG/DL — HIGH (ref 7–18)
CALCIUM SERPL-MCNC: 8.9 MG/DL — SIGNIFICANT CHANGE UP (ref 8.4–10.5)
CHLORIDE SERPL-SCNC: 103 MMOL/L — SIGNIFICANT CHANGE UP (ref 96–108)
CO2 SERPL-SCNC: 22 MMOL/L — SIGNIFICANT CHANGE UP (ref 22–31)
CREAT SERPL-MCNC: 1.01 MG/DL — SIGNIFICANT CHANGE UP (ref 0.5–1.3)
EGFR: 84 ML/MIN/1.73M2 — SIGNIFICANT CHANGE UP
GLUCOSE BLDC GLUCOMTR-MCNC: 171 MG/DL — HIGH (ref 70–99)
GLUCOSE BLDC GLUCOMTR-MCNC: 177 MG/DL — HIGH (ref 70–99)
GLUCOSE BLDC GLUCOMTR-MCNC: 250 MG/DL — HIGH (ref 70–99)
GLUCOSE BLDC GLUCOMTR-MCNC: 333 MG/DL — HIGH (ref 70–99)
GLUCOSE BLDC GLUCOMTR-MCNC: 364 MG/DL — HIGH (ref 70–99)
GLUCOSE SERPL-MCNC: 179 MG/DL — HIGH (ref 70–99)
HCT VFR BLD CALC: 26.9 % — LOW (ref 39–50)
HGB BLD-MCNC: 9.3 G/DL — LOW (ref 13–17)
MCHC RBC-ENTMCNC: 32.6 PG — SIGNIFICANT CHANGE UP (ref 27–34)
MCHC RBC-ENTMCNC: 34.6 G/DL — SIGNIFICANT CHANGE UP (ref 32–36)
MCV RBC AUTO: 94.4 FL — SIGNIFICANT CHANGE UP (ref 80–100)
NRBC # BLD: 0 /100 WBCS — SIGNIFICANT CHANGE UP (ref 0–0)
PLATELET # BLD AUTO: 285 K/UL — SIGNIFICANT CHANGE UP (ref 150–400)
POTASSIUM SERPL-MCNC: 3.8 MMOL/L — SIGNIFICANT CHANGE UP (ref 3.5–5.3)
POTASSIUM SERPL-SCNC: 3.8 MMOL/L — SIGNIFICANT CHANGE UP (ref 3.5–5.3)
PROT SERPL-MCNC: 7 G/DL — SIGNIFICANT CHANGE UP (ref 6–8.3)
RBC # BLD: 2.85 M/UL — LOW (ref 4.2–5.8)
RBC # FLD: 17.8 % — HIGH (ref 10.3–14.5)
SODIUM SERPL-SCNC: 134 MMOL/L — LOW (ref 135–145)
VZV DNA, PCR RESULT: NEGATIVE — SIGNIFICANT CHANGE UP
WBC # BLD: 14.99 K/UL — HIGH (ref 3.8–10.5)
WBC # FLD AUTO: 14.99 K/UL — HIGH (ref 3.8–10.5)

## 2024-11-13 PROCEDURE — G0452: CPT | Mod: 26

## 2024-11-13 PROCEDURE — 99232 SBSQ HOSP IP/OBS MODERATE 35: CPT

## 2024-11-13 RX ADMIN — ENOXAPARIN SODIUM 40 MILLIGRAM(S): 30 INJECTION SUBCUTANEOUS at 18:25

## 2024-11-13 RX ADMIN — PANTOPRAZOLE SODIUM 40 MILLIGRAM(S): 40 TABLET, DELAYED RELEASE ORAL at 05:51

## 2024-11-13 RX ADMIN — Medication 1: at 07:52

## 2024-11-13 RX ADMIN — INSULIN GLARGINE 10 UNIT(S): 100 INJECTION, SOLUTION SUBCUTANEOUS at 21:35

## 2024-11-13 RX ADMIN — Medication 1 TABLET(S): at 12:07

## 2024-11-13 RX ADMIN — Medication 100 MILLIGRAM(S): at 12:07

## 2024-11-13 RX ADMIN — Medication 40 MILLIGRAM(S): at 05:51

## 2024-11-13 RX ADMIN — Medication 5: at 12:08

## 2024-11-13 RX ADMIN — Medication 1 MILLIGRAM(S): at 12:08

## 2024-11-13 RX ADMIN — Medication 4: at 17:02

## 2024-11-13 RX ADMIN — AMLODIPINE BESYLATE 10 MILLIGRAM(S): 10 TABLET ORAL at 05:51

## 2024-11-13 NOTE — PROGRESS NOTE ADULT - SUBJECTIVE AND OBJECTIVE BOX
[   ] ICU                                          [   ] CCU                                      [ X  ] Medical Floor    Patient is a 64 year old male with jaundice. GI consulted to evaluate.         64 year old male with past medical history significant for HTN, DM, ETOH abuse, Hyperlipidemia, presented to the emergency room with 3 days history of jaundice associated with non bloody vomiting and dark color urine. Patient reported drinking ETOH heavily over past 6 months (1 pint vodka daily). Patient denies abdominal pain, hematemesis, hematochezia, melena, fever, chills, chest pain, SOB, cough, hematuria, dysuria or diarrhea.     Patient appears comfortable. No new complaints reported, No abdominal pain, N/V, hematemesis, hematochezia, melena, fever, chills, chest pain, SOB, cough or diarrhea reported.      PAIN MANAGEMENT:  Pain Scale:                0 /10  Pain Location:         PAST MEDICAL HISTORY    HTN (hypertension)    Hyperlipidemia     Diabetes mellitus    ETOH abuse        PAST SURGICAL HISTORY    No significant surgical history reported        Allergies    No Known Allergies    Intolerances  None         SOCIAL HISTORY  Advanced Directives:       [ X ] Full Code       [  ] DNR  Marital Status:         [  ] M      [ X ] S      [  ] D       [  ] W  Children:       [ X ] Yes      [  ] No  Occupation:        [  ] Employed       [ X ] Unemployed       [  ] Retired  Diet:       [ X ] Regular       [  ] PEG feeding          [  ] NG tube feeding  Drug Use:           [ X ] Patient denied          [  ] Yes  Alcohol:           [  ] No             [  ] Yes (socially)         [ X ] Yes (chronic)  Tobacco:           [  ] Yes           [ X ] No      FAMILY HISTORY  [ X ] Heart Disease            [ X ] Diabetes             [ X ] HTN             [  ] Colon Cancer             [  ] Stomach Cancer              [  ] Pancreatic Cancer        VITALS   Vital Signs Last 24 Hrs  T(C): 36.9 (11-13-24 @ 14:25), Max: 36.9 (11-13-24 @ 14:25)  T(F): 98.5 (11-13-24 @ 14:25), Max: 98.5 (11-13-24 @ 14:25)  HR: 102 (11-13-24 @ 14:25) (89 - 102)  BP: 128/75 (11-13-24 @ 14:25) (128/75 - 144/75)   RR: 16 (11-13-24 @ 14:25) (16 - 17)  SpO2: 98% (11-13-24 @ 14:25) (98% - 98%)      MEDICATIONS  (STANDING):  amLODIPine   Tablet 10 milliGRAM(s) Oral daily  enoxaparin Injectable 40 milliGRAM(s) SubCutaneous every 24 hours  folic acid 1 milliGRAM(s) Oral daily  influenza   Vaccine 0.5 milliLiter(s) IntraMuscular once  insulin glargine Injectable (LANTUS) 10 Unit(s) SubCutaneous at bedtime  insulin lispro (ADMELOG) corrective regimen sliding scale   SubCutaneous at bedtime  insulin lispro (ADMELOG) corrective regimen sliding scale   SubCutaneous three times a day before meals  multivitamin 1 Tablet(s) Oral daily  pantoprazole    Tablet 40 milliGRAM(s) Oral before breakfast  prednisoLONE    3 mG/mL Solution (ORAPRED) 40 milliGRAM(s) Oral daily  thiamine 100 milliGRAM(s) Oral daily    MEDICATIONS  (PRN):  aluminum hydroxide/magnesium hydroxide/simethicone Suspension 30 milliLiter(s) Oral every 6 hours PRN Dyspepsia                            9.3    14.99 )-----------( 285      ( 13 Nov 2024 06:45 )             26.9       11-13    134[L]  |  103  |  21[H]  ----------------------------<  179[H]  3.8   |  22  |  1.01    Ca    8.9      13 Nov 2024 06:45    TPro  7.0  /  Alb  2.0[L]  /  TBili  11.4[H]  /  DBili  x   /  AST  118[H]  /  ALT  83[H]  /  AlkPhos  132[H]  11-13

## 2024-11-13 NOTE — PROGRESS NOTE ADULT - ASSESSMENT
64 Y M H/O HTN, HLD, DM, alcohol use disorder, presents with new onset painless jaundice. CT and US showing new cirrhosis, varices, gallstones, and could not r/o cholecystitis. Admitted for further evaluation.  S/P MRCP showing no biliary obstruction. Hepatology & GI consulted.   Hepatology started on steroids and NAC for 4 days, will need Lille score by day 4. monitor steroids for toxicity, monitor blood glucose level closely    11/11-Pt. was accepted to [facility] for evaluation/w/u for possible liver transplant ISO slow response to steroids based on Lille score.  Pt. declined transfer at this time, would like to consult with his family and also to wait for day #7 of steroids and repeat Lille score.   Pt.  was started on prednisolone 40 mg /day and NAC  (liver failure protocol) on 11/7.  Lille score on day #4 shows slow response.  Pt. was restarted on prednisone, Lille score would need to be checked at day #7 again.   Per hepatology,  if by day#7 good prognosis, c/w outpatient prednisone for total 28 days. He is completing NAC today.      11/12-Overall clinical presentation unchanged.  Prednisone changed to prednisolone per hepatology recc.  Will monitor Lille score on day #7 (11/14).    INCOMPLETE 11/13       64 Y M H/O HTN, HLD, DM, alcohol use disorder, presents with new onset painless jaundice. CT and US showing new cirrhosis, varices, gallstones, and could not r/o cholecystitis. Admitted for further evaluation.  S/P MRCP showing no biliary obstruction. Hepatology & GI consulted.   Hepatology started on steroids and NAC for 4 days, will need Lille score by day 4.  monitor steroids for toxicity, monitor blood glucose level closely    11/11-Pt. was accepted to [facility] for evaluation/w/u for possible liver transplant ISO slow response to steroids based on Lille score.  Pt. declined transfer at this time, would like to consult with his family and also to wait for day #7 of steroids and repeat Lille score.   Pt.  was started on prednisolone 40 mg /day and NAC  (liver failure protocol) on 11/7.  Lille score on day #4 shows slow response.  Pt. was restarted on prednisone, Lille score would need to be checked at day #7 again.   Per hepatology,  if by day#7 good prognosis, c/w outpatient prednisone for total 28 days. He is completing NAC today.     11/12-Overall clinical presentation unchanged.  Prednisone changed to prednisolone per hepatology recc.  Will monitor Lille score on day #7 (11/14).

## 2024-11-13 NOTE — PROGRESS NOTE ADULT - PROBLEM SELECTOR PLAN 5
Drinks 1 pint vodka since 5/2024 (detention), reports social drinking prior to detention  Last drink on 10/29  No s/s etoh withdrawal  Monitor for [facility]  Social work consulted

## 2024-11-13 NOTE — PROGRESS NOTE ADULT - PROBLEM SELECTOR PLAN 1
B/W painless jaundice x 3 days, with darker urine  - Drinks 1 pint vodka per day, last drink on 10/29  - RUQ US: No intrahepatic bile duct dilatation visible; the CBD is not visualized due to the limitations of this exam; Cholelithiasis without any convincing evidence for cholecystitis this time  - CTAP: Cholelithiasis with mild gallbladder wall edema and surrounding pericholecystic fluid/ascites in the setting of cirrhosis. Cholecystitis cannot be excluded  - MRCP did not show biliary obstruction  - Acute viral hepatitis panel neg  - CXR neg, Ascites is trace, unlikely to be sufficient for Dx paracentesis  - started on steroids and NAC for 4 days,   - Lille score today (day #4) predicting poor prognosis  - Declined transfer to [facility] at this time  - Cont Prednisone 40mg daily  - Need Lille score on day #7 again  - Hepatology Dr Gillis following  - If good response with steroids will need to cont OP for total 30days B/W painless jaundice x 3 days, with darker urine  - Drinks 1 pint vodka per day, last drink on 10/29  - RUQ US: No intrahepatic bile duct dilatation visible; the CBD is not visualized due to the limitations of this exam; Cholelithiasis without any convincing evidence for cholecystitis this time  - CTAP: Cholelithiasis with mild gallbladder wall edema and surrounding pericholecystic fluid/ascites in the setting of cirrhosis. Cholecystitis cannot be excluded  - MRCP did not show biliary obstruction  - Acute viral hepatitis panel neg  - CXR neg, Ascites is trace, unlikely to be sufficient for Dx paracentesis  - started on steroids and NAC for 4 days,   - Lille score today (day #4) predicting poor prognosis  - Declined transfer to [facility] at this time  - Cont Prednisone 40mg daily  - Need Lille score on day #7 again, follow up ordered labs  - Hepatology Dr Gillis following  - If good response with steroids will need to cont OP for total 30days

## 2024-11-13 NOTE — PROGRESS NOTE ADULT - SUBJECTIVE AND OBJECTIVE BOX
NP Note discussed with  Primary Attending    Patient is a 64y old  Male who presents with a chief complaint of painless jaundice (12 Nov 2024 12:43)      INTERVAL HPI/OVERNIGHT EVENTS: no new complaints    MEDICATIONS  (STANDING):  amLODIPine   Tablet 10 milliGRAM(s) Oral daily  enoxaparin Injectable 40 milliGRAM(s) SubCutaneous every 24 hours  folic acid 1 milliGRAM(s) Oral daily  influenza   Vaccine 0.5 milliLiter(s) IntraMuscular once  insulin glargine Injectable (LANTUS) 10 Unit(s) SubCutaneous at bedtime  insulin lispro (ADMELOG) corrective regimen sliding scale   SubCutaneous three times a day before meals  insulin lispro (ADMELOG) corrective regimen sliding scale   SubCutaneous at bedtime  multivitamin 1 Tablet(s) Oral daily  pantoprazole    Tablet 40 milliGRAM(s) Oral before breakfast  prednisoLONE    3 mG/mL Solution (ORAPRED) 40 milliGRAM(s) Oral daily  thiamine 100 milliGRAM(s) Oral daily    MEDICATIONS  (PRN):  aluminum hydroxide/magnesium hydroxide/simethicone Suspension 30 milliLiter(s) Oral every 6 hours PRN Dyspepsia      __________________________________________________  REVIEW OF SYSTEMS:    CONSTITUTIONAL: No fever,   EYES: no acute visual disturbances  NECK: No pain or stiffness  RESPIRATORY: No cough; No shortness of breath  CARDIOVASCULAR: No chest pain, no palpitations  GASTROINTESTINAL: No pain. No nausea or vomiting; No diarrhea   NEUROLOGICAL: No headache or numbness, no tremors  MUSCULOSKELETAL: No joint pain, no muscle pain  GENITOURINARY: no dysuria, no frequency, no hesitancy  PSYCHIATRY: no depression , no anxiety  ALL OTHER  ROS negative        Vital Signs Last 24 Hrs  T(C): 36.8 (13 Nov 2024 05:00), Max: 37.1 (12 Nov 2024 13:16)  T(F): 98.2 (13 Nov 2024 05:00), Max: 98.7 (12 Nov 2024 13:16)  HR: 94 (13 Nov 2024 05:00) (89 - 94)  BP: 144/75 (13 Nov 2024 05:00) (123/78 - 144/75)  BP(mean): --  RR: 17 (13 Nov 2024 05:00) (17 - 17)  SpO2: 98% (13 Nov 2024 05:00) (98% - 98%)    Parameters below as of 13 Nov 2024 05:00  Patient On (Oxygen Delivery Method): room air        ________________________________________________  PHYSICAL EXAM:  GENERAL: NAD  HEENT: Normocephalic;  conjunctivae and sclerae clear; moist mucous membranes;   NECK : supple  CHEST/LUNG: Clear to auscultation bilaterally with good air entry   HEART: S1 S2  regular; no murmurs, gallops or rubs  ABDOMEN: Soft, Nontender, Nondistended; Bowel sounds present  EXTREMITIES: no cyanosis; no edema; no calf tenderness  SKIN: warm and dry; no rash  NERVOUS SYSTEM:  Awake and alert; Oriented  to place, person and time ; no new deficits    _________________________________________________  LABS:                        9.3    14.99 )-----------( 285      ( 13 Nov 2024 06:45 )             26.9     11-13    134[L]  |  103  |  21[H]  ----------------------------<  179[H]  3.8   |  22  |  1.01    Ca    8.9      13 Nov 2024 06:45    TPro  7.0  /  Alb  2.0[L]  /  TBili  11.4[H]  /  DBili  x   /  AST  118[H]  /  ALT  83[H]  /  AlkPhos  132[H]  11-13      Urinalysis Basic - ( 13 Nov 2024 06:45 )    Color: x / Appearance: x / SG: x / pH: x  Gluc: 179 mg/dL / Ketone: x  / Bili: x / Urobili: x   Blood: x / Protein: x / Nitrite: x   Leuk Esterase: x / RBC: x / WBC x   Sq Epi: x / Non Sq Epi: x / Bacteria: x      CAPILLARY BLOOD GLUCOSE      POCT Blood Glucose.: 177 mg/dL (13 Nov 2024 07:47)  POCT Blood Glucose.: 171 mg/dL (13 Nov 2024 05:54)  POCT Blood Glucose.: 291 mg/dL (12 Nov 2024 21:11)  POCT Blood Glucose.: 354 mg/dL (12 Nov 2024 18:24)  POCT Blood Glucose.: 446 mg/dL (12 Nov 2024 16:30)  POCT Blood Glucose.: 385 mg/dL (12 Nov 2024 11:37)        RADIOLOGY & ADDITIONAL TESTS:    Imaging  Reviewed:  YES/NO    Consultant(s) Notes Reviewed:   YES/ No      Plan of care was discussed with patient and /or primary care giver; all questions and concerns were addressed  NP Note discussed with  Primary Attending    Patient is a 64y old  Male who presents with a chief complaint of painless jaundice (13 Nov 2024 09:35)      INTERVAL HPI/OVERNIGHT EVENTS: no new complaints    MEDICATIONS  (STANDING):  amLODIPine   Tablet 10 milliGRAM(s) Oral daily  enoxaparin Injectable 40 milliGRAM(s) SubCutaneous every 24 hours  folic acid 1 milliGRAM(s) Oral daily  influenza   Vaccine 0.5 milliLiter(s) IntraMuscular once  insulin glargine Injectable (LANTUS) 10 Unit(s) SubCutaneous at bedtime  insulin lispro (ADMELOG) corrective regimen sliding scale   SubCutaneous three times a day before meals  insulin lispro (ADMELOG) corrective regimen sliding scale   SubCutaneous at bedtime  multivitamin 1 Tablet(s) Oral daily  pantoprazole    Tablet 40 milliGRAM(s) Oral before breakfast  prednisoLONE    3 mG/mL Solution (ORAPRED) 40 milliGRAM(s) Oral daily  thiamine 100 milliGRAM(s) Oral daily    MEDICATIONS  (PRN):  aluminum hydroxide/magnesium hydroxide/simethicone Suspension 30 milliLiter(s) Oral every 6 hours PRN Dyspepsia      __________________________________________________  REVIEW OF SYSTEMS:    CONSTITUTIONAL: No fever,   EYES: no acute visual disturbances, + jaundice  NECK: No pain or stiffness  RESPIRATORY: No cough; No shortness of breath  CARDIOVASCULAR: No chest pain, no palpitations  GASTROINTESTINAL: No pain. No nausea or vomiting; No diarrhea   NEUROLOGICAL: No headache or numbness, no tremors  MUSCULOSKELETAL: No joint pain, no muscle pain  GENITOURINARY: no dysuria, no frequency, no hesitancy  PSYCHIATRY: no depression , no anxiety  ALL OTHER  ROS negative        Vital Signs Last 24 Hrs  T(C): 36.8 (13 Nov 2024 05:00), Max: 37.1 (12 Nov 2024 13:16)  T(F): 98.2 (13 Nov 2024 05:00), Max: 98.7 (12 Nov 2024 13:16)  HR: 94 (13 Nov 2024 05:00) (89 - 94)  BP: 144/75 (13 Nov 2024 05:00) (123/78 - 144/75)  BP(mean): --  RR: 17 (13 Nov 2024 05:00) (17 - 17)  SpO2: 98% (13 Nov 2024 05:00) (98% - 98%)    Parameters below as of 13 Nov 2024 05:00  Patient On (Oxygen Delivery Method): room air        ________________________________________________  PHYSICAL EXAM:  GENERAL: NAD, jaundice appearing  HEENT: Normocephalic; conjunctivae and sclerae clear; moist mucous membranes;   NECK : supple  CHEST/LUNG: Clear to auscultation bilaterally with good air entry   HEART: S1 S2  regular; no murmurs, gallops or rubs  ABDOMEN: Soft, Nontender, Nondistended; Bowel sounds present  EXTREMITIES: no cyanosis; no edema; no calf tenderness  SKIN: warm and dry; no rash  NERVOUS SYSTEM:  Awake and alert; Oriented  to place, person and time ; no new deficits    _________________________________________________  LABS:                        9.3    14.99 )-----------( 285      ( 13 Nov 2024 06:45 )             26.9     11-13    134[L]  |  103  |  21[H]  ----------------------------<  179[H]  3.8   |  22  |  1.01    Ca    8.9      13 Nov 2024 06:45    TPro  7.0  /  Alb  2.0[L]  /  TBili  11.4[H]  /  DBili  x   /  AST  118[H]  /  ALT  83[H]  /  AlkPhos  132[H]  11-13      Urinalysis Basic - ( 13 Nov 2024 06:45 )    Color: x / Appearance: x / SG: x / pH: x  Gluc: 179 mg/dL / Ketone: x  / Bili: x / Urobili: x   Blood: x / Protein: x / Nitrite: x   Leuk Esterase: x / RBC: x / WBC x   Sq Epi: x / Non Sq Epi: x / Bacteria: x      CAPILLARY BLOOD GLUCOSE      POCT Blood Glucose.: 177 mg/dL (13 Nov 2024 07:47)  POCT Blood Glucose.: 171 mg/dL (13 Nov 2024 05:54)  POCT Blood Glucose.: 291 mg/dL (12 Nov 2024 21:11)  POCT Blood Glucose.: 354 mg/dL (12 Nov 2024 18:24)  POCT Blood Glucose.: 446 mg/dL (12 Nov 2024 16:30)  POCT Blood Glucose.: 385 mg/dL (12 Nov 2024 11:37)        RADIOLOGY & ADDITIONAL TESTS:    Imaging  Reviewed:  YES/NO    Consultant(s) Notes Reviewed:   YES/ No      Plan of care was discussed with patient and /or primary care giver; all questions and concerns were addressed

## 2024-11-13 NOTE — PHARMACOTHERAPY INTERVENTION NOTE - COMMENTS
Confirmed with provider that Prednisolone is the drug intended for this pt and not prednisone (as they are look alike sound alike meds).   Prednisolone was confirmed.

## 2024-11-13 NOTE — PROGRESS NOTE ADULT - PROBLEM SELECTOR PLAN 12
DVT PPX: Hold iso liver cirrhosis  GI PPX: PPI
Pt is from home, ambulatory  Pending clinical improvement
DVT PPX: Lovenox SQ  GI PPX: PPI      Dispo: Pending final hepatology recs
Pt is from home, ambulatory    Hepatology recs:    - If MELD (original MELD) is 25 or less, can be also considered for  clinical trial, and in that case transfer to [facility] would be needed. Today MELD (original) is 24. Will d/w patient steroid vs. clinical trial.
DVT PPX: Hold iso liver cirrhosis  GI PPX: PPI
Lovenox SQ
DVT PPX: Hold iso liver cirrhosis  GI PPX: PPI

## 2024-11-13 NOTE — PROGRESS NOTE ADULT - PROBLEM SELECTOR PLAN 6
- CT shows gallstone  - No signs of acute cholecystitis> neg collier sign  - MRCP shows Cholelithiasis and adenomyomatosis. No biliary obstruction. - CT shows gallstone  - No signs of acute cholecystitis> neg Traylor's sign  - MRCP shows Cholelithiasis and adenomyomatosis. No biliary obstruction.

## 2024-11-14 DIAGNOSIS — R73.9 HYPERGLYCEMIA, UNSPECIFIED: ICD-10-CM

## 2024-11-14 LAB
ALBUMIN SERPL ELPH-MCNC: 2.2 G/DL — LOW (ref 3.5–5)
ALP SERPL-CCNC: 139 U/L — HIGH (ref 40–120)
ALT FLD-CCNC: 96 U/L DA — HIGH (ref 10–60)
ANION GAP SERPL CALC-SCNC: 9 MMOL/L — SIGNIFICANT CHANGE UP (ref 5–17)
AST SERPL-CCNC: 153 U/L — HIGH (ref 10–40)
BILIRUB SERPL-MCNC: 11.7 MG/DL — HIGH (ref 0.2–1.2)
BUN SERPL-MCNC: 22 MG/DL — HIGH (ref 7–18)
CALCIUM SERPL-MCNC: 9.3 MG/DL — SIGNIFICANT CHANGE UP (ref 8.4–10.5)
CHLORIDE SERPL-SCNC: 103 MMOL/L — SIGNIFICANT CHANGE UP (ref 96–108)
CO2 SERPL-SCNC: 22 MMOL/L — SIGNIFICANT CHANGE UP (ref 22–31)
CREAT SERPL-MCNC: 1.05 MG/DL — SIGNIFICANT CHANGE UP (ref 0.5–1.3)
EGFR: 80 ML/MIN/1.73M2 — SIGNIFICANT CHANGE UP
GLUCOSE BLDC GLUCOMTR-MCNC: 189 MG/DL — HIGH (ref 70–99)
GLUCOSE BLDC GLUCOMTR-MCNC: 213 MG/DL — HIGH (ref 70–99)
GLUCOSE BLDC GLUCOMTR-MCNC: 353 MG/DL — HIGH (ref 70–99)
GLUCOSE BLDC GLUCOMTR-MCNC: 375 MG/DL — HIGH (ref 70–99)
GLUCOSE SERPL-MCNC: 186 MG/DL — HIGH (ref 70–99)
HCT VFR BLD CALC: 26.3 % — LOW (ref 39–50)
HGB BLD-MCNC: 9.2 G/DL — LOW (ref 13–17)
INR BLD: 1.52 RATIO — HIGH (ref 0.85–1.16)
MCHC RBC-ENTMCNC: 33.7 PG — SIGNIFICANT CHANGE UP (ref 27–34)
MCHC RBC-ENTMCNC: 35 G/DL — SIGNIFICANT CHANGE UP (ref 32–36)
MCV RBC AUTO: 96.3 FL — SIGNIFICANT CHANGE UP (ref 80–100)
NRBC # BLD: 0 /100 WBCS — SIGNIFICANT CHANGE UP (ref 0–0)
PLATELET # BLD AUTO: 287 K/UL — SIGNIFICANT CHANGE UP (ref 150–400)
POTASSIUM SERPL-MCNC: 4 MMOL/L — SIGNIFICANT CHANGE UP (ref 3.5–5.3)
POTASSIUM SERPL-SCNC: 4 MMOL/L — SIGNIFICANT CHANGE UP (ref 3.5–5.3)
PROT SERPL-MCNC: 7.1 G/DL — SIGNIFICANT CHANGE UP (ref 6–8.3)
PROTHROM AB SERPL-ACNC: 17.7 SEC — HIGH (ref 9.9–13.4)
RBC # BLD: 2.73 M/UL — LOW (ref 4.2–5.8)
RBC # FLD: 18 % — HIGH (ref 10.3–14.5)
SODIUM SERPL-SCNC: 134 MMOL/L — LOW (ref 135–145)
TM INTERPRETATION: SIGNIFICANT CHANGE UP
WBC # BLD: 14.91 K/UL — HIGH (ref 3.8–10.5)
WBC # FLD AUTO: 14.91 K/UL — HIGH (ref 3.8–10.5)

## 2024-11-14 PROCEDURE — 99233 SBSQ HOSP IP/OBS HIGH 50: CPT | Mod: GC

## 2024-11-14 RX ADMIN — ENOXAPARIN SODIUM 40 MILLIGRAM(S): 30 INJECTION SUBCUTANEOUS at 18:42

## 2024-11-14 RX ADMIN — Medication 100 MILLIGRAM(S): at 11:30

## 2024-11-14 RX ADMIN — Medication 5: at 16:48

## 2024-11-14 RX ADMIN — INSULIN GLARGINE 10 UNIT(S): 100 INJECTION, SOLUTION SUBCUTANEOUS at 21:25

## 2024-11-14 RX ADMIN — AMLODIPINE BESYLATE 10 MILLIGRAM(S): 10 TABLET ORAL at 05:30

## 2024-11-14 RX ADMIN — PANTOPRAZOLE SODIUM 40 MILLIGRAM(S): 40 TABLET, DELAYED RELEASE ORAL at 05:30

## 2024-11-14 RX ADMIN — Medication 1: at 08:02

## 2024-11-14 RX ADMIN — Medication 1 TABLET(S): at 11:30

## 2024-11-14 RX ADMIN — Medication 5: at 11:29

## 2024-11-14 RX ADMIN — Medication 40 MILLIGRAM(S): at 05:30

## 2024-11-14 RX ADMIN — Medication 1 MILLIGRAM(S): at 11:30

## 2024-11-14 RX ADMIN — Medication 4 UNIT(S): at 16:49

## 2024-11-14 NOTE — PROGRESS NOTE ADULT - PROBLEM SELECTOR PLAN 2
ISO alcoholic cirrhosis-Not improving  -TBili  12.5[H]  /  DBili  x   /  AST  145[H]  /  ALT  77[H]  /  AlkPhos  130[H]  11-11  -f/u daily CMP  -Avoid hepatotoxic meds  -11/14-->TPro  7.1  /  Alb  2.2[L]  /  TBili  11.7[H]  /  DBili  x   /  AST  153[H]  /  ALT  96[H]  /  AlkPhos  139[H]  11-14    PT/INR - ( 14 Nov 2024 07:39 )   PT: 17.7 sec;   INR: 1.52 ratio

## 2024-11-14 NOTE — PROGRESS NOTE ADULT - SUBJECTIVE AND OBJECTIVE BOX
[   ] ICU                                          [   ] CCU                                      [ x  ] Medical Floor    Patient is a 64 year old male with jaundice. GI consulted to evaluate.         64 year old male with past medical history significant for HTN, DM, ETOH abuse, Hyperlipidemia, presented to the emergency room with 3 days history of jaundice associated with non bloody vomiting and dark color urine. Patient reported drinking ETOH heavily over past 6 months (1 pint vodka daily). Patient denies abdominal pain, hematemesis, hematochezia, melena, fever, chills, chest pain, SOB, cough, hematuria, dysuria or diarrhea.     Patient appears comfortable. No new complaints reported, No abdominal pain, N/V, hematemesis, hematochezia, melena, fever, chills, chest pain, SOB, cough or diarrhea reported.      PAIN MANAGEMENT:  Pain Scale:                0 /10  Pain Location:         PAST MEDICAL HISTORY    HTN (hypertension)    Hyperlipidemia     Diabetes mellitus    ETOH abuse        PAST SURGICAL HISTORY    No significant surgical history reported        Allergies    No Known Allergies    Intolerances  None         SOCIAL HISTORY  Advanced Directives:       [ X ] Full Code       [  ] DNR  Marital Status:         [  ] M      [ X ] S      [  ] D       [  ] W  Children:       [ X ] Yes      [  ] No  Occupation:        [  ] Employed       [ X ] Unemployed       [  ] Retired  Diet:       [ X ] Regular       [  ] PEG feeding          [  ] NG tube feeding  Drug Use:           [ X ] Patient denied          [  ] Yes  Alcohol:           [  ] No             [  ] Yes (socially)         [ X ] Yes (chronic)  Tobacco:           [  ] Yes           [ X ] No      FAMILY HISTORY  [ X ] Heart Disease            [ X ] Diabetes             [ X ] HTN             [  ] Colon Cancer             [  ] Stomach Cancer              [  ] Pancreatic Cancer          VITALS   Vital Signs Last 24 Hrs  T(C): 37.1 (11-14-24 @ 05:15), Max: 37.1 (11-14-24 @ 05:15)  T(F): 98.8 (11-14-24 @ 05:15), Max: 98.8 (11-14-24 @ 05:15)  HR: 92 (11-14-24 @ 05:15) (92 - 102)  BP: 135/80 (11-14-24 @ 05:15) (128/75 - 135/80)     RR: 16 (11-14-24 @ 05:15) (16 - 16)  SpO2: 99% (11-14-24 @ 05:15) (97% - 99%)        MEDICATIONS  (STANDING):  amLODIPine   Tablet 10 milliGRAM(s) Oral daily  enoxaparin Injectable 40 milliGRAM(s) SubCutaneous every 24 hours  folic acid 1 milliGRAM(s) Oral daily  influenza   Vaccine 0.5 milliLiter(s) IntraMuscular once  insulin glargine Injectable (LANTUS) 10 Unit(s) SubCutaneous at bedtime  insulin lispro (ADMELOG) corrective regimen sliding scale   SubCutaneous three times a day before meals  insulin lispro (ADMELOG) corrective regimen sliding scale   SubCutaneous at bedtime  multivitamin 1 Tablet(s) Oral daily  pantoprazole    Tablet 40 milliGRAM(s) Oral before breakfast  prednisoLONE    3 mG/mL Solution (ORAPRED) 40 milliGRAM(s) Oral daily  thiamine 100 milliGRAM(s) Oral daily    MEDICATIONS  (PRN):  aluminum hydroxide/magnesium hydroxide/simethicone Suspension 30 milliLiter(s) Oral every 6 hours PRN Dyspepsia                            9.2    14.91 )-----------( 287      ( 14 Nov 2024 07:39 )             26.3       11-14    134[L]  |  103  |  22[H]  ----------------------------<  186[H]  4.0   |  22  |  1.05    Ca    9.3      14 Nov 2024 07:39    TPro  7.1  /  Alb  2.2[L]  /  TBili  11.7[H]  /  DBili  x   /  AST  153[H]  /  ALT  96[H]  /  AlkPhos  139[H]  11-14      PT/INR - ( 14 Nov 2024 07:39 )   PT: 17.7 sec;   INR: 1.52 ratio

## 2024-11-14 NOTE — PROGRESS NOTE ADULT - SUBJECTIVE AND OBJECTIVE BOX
Hepatology PROGRESS NOTE    Patient is a 64y old  Male who presents with a chief complaint of painless jaundice (14 Nov 2024 11:51)      HPI:  64 Y M H/O HTN, HLD, DM, alcohol use disorder, presents for painless jaundice x 3 days. Also reports 1 episode of vomiting (white, NBNB) one week ago, as well as darker urine for 1 week. Pt has history of heavy drinking for past 6 months, 1 pint vodka per day, last drink on 10/29. Denies itching, abdominal pain, nausea, fever, chills, SOB, wheezing, chest pain, palpitations, body aches, dysuria, or any other complaints. Pt was recently admitted here on 11/2, left hospital AMA, and then returned here after recommendation from a doctor's phone call.    In ED:  - Vitals: , /69, SpO2 97% on RA, 99 F  - Labs: WBC 11, Hgb 9.8. PT 19, INR 1.68. Na 129, Cr 1.3. T Bili 18, D Bili 14, , , ALT 45, lipase 203  - Recent imaging:  CTAP: cirrhosis, hepatomegaly, portal HTN, varices, gall stones, cholecystic wall edema, pericholecystic fluid  Abd US: hepatomegaly, gall stones, distended gall bladder, negative sonographic Traylor, mild prominent CBD    Pt admitted for painless jaundice cirrhosis, alcohol use disorder. (04 Nov 2024 17:38)      Overnight events: Patient seen and examined on unit. resting in bed. no overnight complaints or events.     ______________________________________________________________________  PMH/PSH:  PAST MEDICAL & SURGICAL HISTORY:  No pertinent past medical history      HTN (hypertension)      HLD (hyperlipidemia)      Diabetes mellitus        ______________________________________________________________________  MEDS:  MEDICATIONS  (STANDING):  amLODIPine   Tablet 10 milliGRAM(s) Oral daily  enoxaparin Injectable 40 milliGRAM(s) SubCutaneous every 24 hours  folic acid 1 milliGRAM(s) Oral daily  influenza   Vaccine 0.5 milliLiter(s) IntraMuscular once  insulin glargine Injectable (LANTUS) 10 Unit(s) SubCutaneous at bedtime  insulin lispro (ADMELOG) corrective regimen sliding scale   SubCutaneous three times a day before meals  insulin lispro (ADMELOG) corrective regimen sliding scale   SubCutaneous at bedtime  insulin lispro Injectable (ADMELOG) 4 Unit(s) SubCutaneous three times a day before meals  multivitamin 1 Tablet(s) Oral daily  pantoprazole    Tablet 40 milliGRAM(s) Oral before breakfast  prednisoLONE    3 mG/mL Solution (ORAPRED) 40 milliGRAM(s) Oral daily  thiamine 100 milliGRAM(s) Oral daily    MEDICATIONS  (PRN):  aluminum hydroxide/magnesium hydroxide/simethicone Suspension 30 milliLiter(s) Oral every 6 hours PRN Dyspepsia    ______________________________________________________________________  ALL:   Allergies    No Known Allergies    Intolerances      ______________________________________________________________________  SH: Social History:  - Lives at home alone  - Ambulates without cane or walker (04 Nov 2024 17:38)    ______________________________________________________________________  FH:  FAMILY HISTORY:    ______________________________________________________________________  ROS:    CONSTITUTIONAL:  No weight loss, fever, chills, weakness or fatigue.    HEENT:  Eyes:  No visual loss, blurred vision, double vision or yellow sclerae. Ears, Nose, Throat:  No hearing loss, sneezing, congestion, runny nose or sore throat.    SKIN:  No rash or itching.    CARDIOVASCULAR:  No chest pain, chest pressure or chest discomfort. No palpitations or edema.    RESPIRATORY:  No shortness of breath, cough or sputum.    GASTROINTESTINAL:  SEE HPI    GENITOURINARY:  No dysuria, hematuria, urinary frequency    NEUROLOGICAL:  No headache, dizziness, syncope, paralysis, ataxia, numbness or tingling in the extremities. No change in bowel or bladder control.    MUSCULOSKELETAL:  No muscle, back pain, joint pain or stiffness.    HEMATOLOGIC:  No anemia, bleeding or bruising.    LYMPHATICS:  No enlarged nodes. No history of splenectomy.    PSYCHIATRIC:  No history of depression or anxiety.    ENDOCRINOLOGIC:  No reports of sweating, cold or heat intolerance. No polyuria or polydipsia.    ALLERGIES:  No history of asthma, hives, eczema or rhinitis.  ______________________________________________________________________  PHYSICAL EXAM:  T(C): 36.9 (11-14-24 @ 14:14), Max: 37.1 (11-14-24 @ 05:15)  HR: 90 (11-14-24 @ 14:14)  BP: 126/74 (11-14-24 @ 14:14)  RR: 16 (11-14-24 @ 14:14)  SpO2: 97% (11-14-24 @ 14:14)  Wt(kg): --      GEN: NAD   CVS- S1 S2  ABD: soft nontender, non distended, bowel sounds+  LUNGS: clear to auscultation  NEURO: non focal neuro exam; AAO x 3  Extremities: no cyanosis, no calf tenderness, no edema, no clubbing      ______________________________________________________________________  LABS:                        9.2    14.91 )-----------( 287      ( 14 Nov 2024 07:39 )             26.3     11-14    134[L]  |  103  |  22[H]  ----------------------------<  186[H]  4.0   |  22  |  1.05    Ca    9.3      14 Nov 2024 07:39    TPro  7.1  /  Alb  2.2[L]  /  TBili  11.7[H]  /  DBili  x   /  AST  153[H]  /  ALT  96[H]  /  AlkPhos  139[H]  11-14    LIVER FUNCTIONS - ( 14 Nov 2024 07:39 )  Alb: 2.2 g/dL / Pro: 7.1 g/dL / ALK PHOS: 139 U/L / ALT: 96 U/L DA / AST: 153 U/L / GGT: x           ______________________________________________________________________  IMAGING:               Hepatology PROGRESS NOTE    Patient is a 64y old  Male who presents with a chief complaint of jaundice (14 Nov 2024 11:51)      HPI:  64 Y M H/O HTN, HLD, DM, alcohol use disorder, presents for painless jaundice x 3 days. Also reports 1 episode of vomiting (white, [place]) one week ago, as well as darker urine for 1 week. Pt has history of heavy drinking for past 6 months, 1 pint vodka per day, last drink on 10/29. Denies itching, abdominal pain, nausea, fever, chills, SOB, wheezing, chest pain, palpitations, body aches, dysuria, or any other complaints. Pt was recently admitted here on 11/2, left hospital AMA, and then returned here after recommendation from a doctor's phone call.    In ED:  - Vitals: , /69, SpO2 97% on RA, 99 F  - Labs: WBC 11, Hgb 9.8. PT 19, INR 1.68. Na 129, Cr 1.3. T Bili 18, D Bili 14, , , ALT 45, lipase 203  - Recent imaging:  CTAP: cirrhosis, hepatomegaly, portal HTN, varices, gall stones, cholecystic wall edema, pericholecystic fluid  Abd US: hepatomegaly, gall stones, distended gall bladder, negative sonographic Traylor, mild prominent CBD    Pt admitted for painless jaundice cirrhosis, alcohol use disorder. (04 Nov 2024 17:38)      Overnight events: Patient seen and examined on unit. resting in bed. no overnight complaints or events.     ______________________________________________________________________  PMH/PSH:  PAST MEDICAL & SURGICAL HISTORY:  No pertinent past medical history      HTN (hypertension)      HLD (hyperlipidemia)      Diabetes mellitus        ______________________________________________________________________  MEDS:  MEDICATIONS  (STANDING):  amLODIPine   Tablet 10 milliGRAM(s) Oral daily  enoxaparin Injectable 40 milliGRAM(s) SubCutaneous every 24 hours  folic acid 1 milliGRAM(s) Oral daily  influenza   Vaccine 0.5 milliLiter(s) IntraMuscular once  insulin glargine Injectable (LANTUS) 10 Unit(s) SubCutaneous at bedtime  insulin lispro (ADMELOG) corrective regimen sliding scale   SubCutaneous three times a day before meals  insulin lispro (ADMELOG) corrective regimen sliding scale   SubCutaneous at bedtime  insulin lispro Injectable (ADMELOG) 4 Unit(s) SubCutaneous three times a day before meals  multivitamin 1 Tablet(s) Oral daily  pantoprazole    Tablet 40 milliGRAM(s) Oral before breakfast  prednisoLONE    3 mG/mL Solution (ORAPRED) 40 milliGRAM(s) Oral daily  thiamine 100 milliGRAM(s) Oral daily    MEDICATIONS  (PRN):  aluminum hydroxide/magnesium hydroxide/simethicone Suspension 30 milliLiter(s) Oral every 6 hours PRN Dyspepsia    ______________________________________________________________________  ALL:   Allergies    No Known Allergies    Intolerances      ______________________________________________________________________  SH: Social History:  - Lives at home alone  - Ambulates without cane or walker (04 Nov 2024 17:38)    ______________________________________________________________________  FH:  FAMILY HISTORY:    ______________________________________________________________________  ROS:    CONSTITUTIONAL:  No weight loss, fever, chills, weakness or fatigue.    HEENT:  Eyes:  No visual loss, blurred vision, double vision or yellow sclerae. Ears, Nose, Throat:  No hearing loss, sneezing, congestion, runny nose or sore throat.    SKIN:  No rash or itching.    CARDIOVASCULAR:  No chest pain, chest pressure or chest discomfort. No palpitations or edema.    RESPIRATORY:  No shortness of breath, cough or sputum.    GASTROINTESTINAL:  SEE HPI    GENITOURINARY:  No dysuria, hematuria, urinary frequency    NEUROLOGICAL:  No headache, dizziness, syncope, paralysis, ataxia, numbness or tingling in the extremities. No change in bowel or bladder control.    MUSCULOSKELETAL:  No muscle, back pain, joint pain or stiffness.    HEMATOLOGIC:  No anemia, bleeding or bruising.    LYMPHATICS:  No enlarged nodes. No history of splenectomy.    PSYCHIATRIC:  No history of depression or anxiety.    ENDOCRINOLOGIC:  No reports of sweating, cold or heat intolerance. No polyuria or polydipsia.    ALLERGIES:  No history of asthma, hives, eczema or rhinitis.  ______________________________________________________________________  PHYSICAL EXAM:  T(C): 36.9 (11-14-24 @ 14:14), Max: 37.1 (11-14-24 @ 05:15)  HR: 90 (11-14-24 @ 14:14)  BP: 126/74 (11-14-24 @ 14:14)  RR: 16 (11-14-24 @ 14:14)  SpO2: 97% (11-14-24 @ 14:14)  Wt(kg): --      GEN: NAD   CVS- S1 S2  ABD: soft nontender, non distended, bowel sounds+  LUNGS: clear to auscultation  NEURO: non focal neuro exam; AAO x 3  Extremities: no cyanosis, no calf tenderness, no edema, no clubbing      ______________________________________________________________________  LABS:                        9.2    14.91 )-----------( 287      ( 14 Nov 2024 07:39 )             26.3     11-14    134[L]  |  103  |  22[H]  ----------------------------<  186[H]  4.0   |  22  |  1.05    Ca    9.3      14 Nov 2024 07:39    TPro  7.1  /  Alb  2.2[L]  /  TBili  11.7[H]  /  DBili  x   /  AST  153[H]  /  ALT  96[H]  /  AlkPhos  139[H]  11-14    LIVER FUNCTIONS - ( 14 Nov 2024 07:39 )  Alb: 2.2 g/dL / Pro: 7.1 g/dL / ALK PHOS: 139 U/L / ALT: 96 U/L DA / AST: 153 U/L / GGT: x           ______________________________________________________________________  IMAGING:

## 2024-11-14 NOTE — PROGRESS NOTE ADULT - SUBJECTIVE AND OBJECTIVE BOX
NP Note discussed with  Primary Attending    Patient is a 64y old  Male who presents with a chief complaint of painless jaundice (14 Nov 2024 11:12).  Seen at bedside, reports feeling well with no c/o discomfort.  Discussed continued plan of care with pt., all questions answered.      INTERVAL HPI/OVERNIGHT EVENTS: no new complaints    MEDICATIONS  (STANDING):  amLODIPine   Tablet 10 milliGRAM(s) Oral daily  enoxaparin Injectable 40 milliGRAM(s) SubCutaneous every 24 hours  folic acid 1 milliGRAM(s) Oral daily  influenza   Vaccine 0.5 milliLiter(s) IntraMuscular once  insulin glargine Injectable (LANTUS) 10 Unit(s) SubCutaneous at bedtime  insulin lispro (ADMELOG) corrective regimen sliding scale   SubCutaneous three times a day before meals  insulin lispro (ADMELOG) corrective regimen sliding scale   SubCutaneous at bedtime  multivitamin 1 Tablet(s) Oral daily  pantoprazole    Tablet 40 milliGRAM(s) Oral before breakfast  prednisoLONE    3 mG/mL Solution (ORAPRED) 40 milliGRAM(s) Oral daily  thiamine 100 milliGRAM(s) Oral daily    MEDICATIONS  (PRN):  aluminum hydroxide/magnesium hydroxide/simethicone Suspension 30 milliLiter(s) Oral every 6 hours PRN Dyspepsia      __________________________________________________  REVIEW OF SYSTEMS:    CONSTITUTIONAL: No fever,   EYES: no acute visual disturbances  NECK: No pain or stiffness  RESPIRATORY: No cough; No shortness of breath  CARDIOVASCULAR: No chest pain, no palpitations  GASTROINTESTINAL: No pain. No nausea or vomiting; No diarrhea   NEUROLOGICAL: No headache or numbness, no tremors  MUSCULOSKELETAL: No joint pain, no muscle pain  GENITOURINARY: no dysuria, no frequency, no hesitancy  PSYCHIATRY: no depression , no anxiety  ALL OTHER  ROS negative        Vital Signs Last 24 Hrs  T(C): 37.1 (14 Nov 2024 05:15), Max: 37.1 (14 Nov 2024 05:15)  T(F): 98.8 (14 Nov 2024 05:15), Max: 98.8 (14 Nov 2024 05:15)  HR: 92 (14 Nov 2024 05:15) (92 - 102)  BP: 135/80 (14 Nov 2024 05:15) (128/75 - 135/80)  BP(mean): --  RR: 16 (14 Nov 2024 05:15) (16 - 16)  SpO2: 99% (14 Nov 2024 05:15) (97% - 99%)    Parameters below as of 14 Nov 2024 05:15  Patient On (Oxygen Delivery Method): room air        ________________________________________________  PHYSICAL EXAM:  Well developed, mildly jaundiced  GENERAL: NAD  HEENT: Normocephalic;  conjunctivae and sclerae clear; moist mucous membranes;   NECK : supple  CHEST/LUNG: Clear to auscultation bilaterally with good air entry   HEART: S1 S2  regular; no murmurs, gallops or rubs  ABDOMEN: Large, Soft, Nontender, Nondistended; Bowel sounds present  EXTREMITIES: no cyanosis; no edema; no calf tenderness  SKIN: warm and dry; no rash  NERVOUS SYSTEM:  Awake and alert; Oriented  to place, person and time ; no new deficits    _________________________________________________  LABS:                        9.2    14.91 )-----------( 287      ( 14 Nov 2024 07:39 )             26.3     11-14    134[L]  |  103  |  22[H]  ----------------------------<  186[H]  4.0   |  22  |  1.05    Ca    9.3      14 Nov 2024 07:39    TPro  7.1  /  Alb  2.2[L]  /  TBili  11.7[H]  /  DBili  x   /  AST  153[H]  /  ALT  96[H]  /  AlkPhos  139[H]  11-14    PT/INR - ( 14 Nov 2024 07:39 )   PT: 17.7 sec;   INR: 1.52 ratio           Urinalysis Basic - ( 14 Nov 2024 07:39 )    Color: x / Appearance: x / SG: x / pH: x  Gluc: 186 mg/dL / Ketone: x  / Bili: x / Urobili: x   Blood: x / Protein: x / Nitrite: x   Leuk Esterase: x / RBC: x / WBC x   Sq Epi: x / Non Sq Epi: x / Bacteria: x      CAPILLARY BLOOD GLUCOSE      POCT Blood Glucose.: 375 mg/dL (14 Nov 2024 11:18)  POCT Blood Glucose.: 189 mg/dL (14 Nov 2024 07:29)  POCT Blood Glucose.: 250 mg/dL (13 Nov 2024 21:13)  POCT Blood Glucose.: 333 mg/dL (13 Nov 2024 16:34)    RADIOLOGY & ADDITIONAL TESTS:  < from: MR MRCP w/wo IV Cont (11.05.24 @ 14:28) >    ACC: [id number] EXAM:  MR MRCP WAW IC   ORDERED BY: MUSA POOL     PROCEDURE DATE:  11/05/2024          INTERPRETATION:  CLINICAL INFORMATION: Rule out biliary obstruction.   Jaundice.    COMPARISON: CT dated 11/02/2024.    CONTRAST/COMPLICATIONS:  IV Contrast: Gadavist  7.5 cc administered   0 cc discarded  Oral Contrast: NONE  Complications: None reported at time of study completion    PROCEDURE:  MRI of the abdomen was performed.  MRCP was performed.    FINDINGS:  LOWER CHEST: Trace atelectasis right lung base.    LIVER: Enlarged measuring approximately 24 cm. Nodular contour. Fatty   change in the periportal distribution. No evidence of any arterial phase   hyperenhancing mass to suggest HCC.  BILE DUCTS: Normal caliber.  GALLBLADDER: Combination of cholelithiasis and adenomyomatosis.  SPLEEN: Enlarged measuring approximately 14.5 cm.  PANCREAS: Within normal limits.  ADRENALS: Within normal limits.  KIDNEYS/URETERS: Within normal limits.    VISUALIZED PORTIONS:  BOWEL: Within normallimits.  PERITONEUM: Trace ascites and mesenteric edema.  VESSELS: Portal venous collaterals including lower esophageal varices.  RETROPERITONEUM/LYMPH NODES: No lymphadenopathy.  ABDOMINAL WALL: Within normal limits.  BONES: Within normal limits.    IMPRESSION:  Cirrhosis with splenomegaly and portal venous collaterals including the   lower esophageal varices. Trace ascites.  Periportal distribution of hepatic steatosis. No focal hepatic mass   lesion.  Cholelithiasis and adenomyomatosis.  No biliary obstruction.    < end of copied text >    < from: Xray Chest 1 View- PORTABLE-Urgent (Xray Chest 1 View- PORTABLE-Urgent .) (11.04.24 @ 19:32) >    ACC: [id number] EXAM:  XR CHEST PORTABLE URGENT 1V   ORDERED BY: MUSA POOL     PROCEDURE DATE:  11/04/2024          INTERPRETATION:  Clinical history: 64-year-old male, rule out pneumonia.    Portable view of the chest is correlated with the MRCPof 11/5/2024.    FINDINGS: Normal cardiac silhouette and normal pulmonary vasculature with   no consolidation, effusion, pneumothorax or acute osseous findings.    IMPRESSION:  No acute radiographic findings, in particular no pneumonia    --- End of Report ---    < end of copied text >    < from: US Abdomen Upper Quadrant Right (11.04.24 @ 11:34) >    ACC: [id number] EXAM:  US ABDOMEN RT UPR QUADRANT   ORDERED BY:  MAX LAZARUS     PROCEDURE DATE:  11/04/2024          INTERPRETATION:  CLINICAL INFORMATION: Jaundice.    COMPARISON: 11/02/2024.    TECHNIQUE: Sonography of the right upper quadrant.    FINDINGS:  Liver: Increased echogenicity. Nodular contour. These findings suggest a   combination of hepatocellular disease and steatosis. The liver is   enlarged measuring approximately 21 cm. No focal hepatic mass lesion was   identified.  Bile ducts: Mildly prominent. Common bile duct measures 8 mm.  Gallbladder: Cholelithiasis. Distended gallbladder. Possibility of a 1.8   cm sized stone impacted in the neck of the gallbladder. Trace nonspecific   pericholecystic fluid. Negative Traylor's sign.  Pancreas: Poorly visualized.  Right kidney: 10.6 cm. No hydronephrosis.  Ascites: None.  IVC: Visualized portions are within normal limits.    IMPRESSION:  Hepatomegaly. Suspect a combination of hepatocellular disease and fatty   liver.  Cholelithiasis and distended gallbladder. Possibility of a stone impacted   in the neck of the gallbladder. No sonographic finding to suggest acute   cholecystitis considering negative Traylor's sign.  Mildly prominent CBD. Consider correlation with MRCP.        --- End of Report ---    < end of copied text >      < from: CT Abdomen and Pelvis w/ IV Cont (11.02.24 @ 16:43) >    ACC: [id number] EXAM:  CT ABDOMEN AND PELVIS IC   ORDERED BY: FREDDIE MOCK     PROCEDURE DATE:  11/02/2024          INTERPRETATION:  CLINICAL INFORMATION: Painless jaundice, concern for   cirrhosis    COMPARISON: None.    CONTRAST/COMPLICATIONS:  IV Contrast: Omnipaque 350  90 cc administered   10 cc discarded  Oral Contrast: NONE  Complications: None reported at time of study completion    PROCEDURE:  CT of the Abdomen and Pelvis was performed.  Sagittal and coronal reformats were performed.    FINDINGS:    LOWER CHEST: No visualized pleural effusion. Bibasilar atelectasis.   Multivessel coronary artery calcifications, aortic root/valve   calcification, and mitral annular calcification.    LIVER: Cirrhotic liver morphology. Enlarged liver, 24 cm in craniocaudal   dimension. Markedly heterogeneous attenuation of the liver parenchyma.   Liver lesions cannot be excluded. Narrowed appearance of the intrahepatic   IVC.  BILE DUCTS: No distention  GALLBLADDER: Cholelithiasis with mild gallbladder wall edema and   surrounding pericholecystic fluid/ascites in the setting of cirrhosis.   Cholecystitis cannot be excluded.  SPLEEN: Enlarged, 15 cm in anterior to posterior dimension  PANCREAS: No acute peripancreatic inflammation  ADRENALS: Unremarkable  KIDNEYS/URETERS: No hydronephrosis or obstructing ureteral calculus    BLADDER: Minimally distended.  REPRODUCTIVE ORGANS: Prostate is enlarged    BOWEL: Upper abdominal varices and paraesophageal/perigastric, and   anterior abdominal locations. Stomach is underdistended. No small bowel   distention. Appendix is not obstructed. Right-sided colonic thickening   may be due to portal gastropathy versus colitis. Colon is overall   nondistended with minimal stool burden.  PERITONEUM/RETROPERITONEUM: Trace ascites. Generalized mild mesenteric   haziness. Small volume nodes not enlarged by CT size criteria. The small  VESSELS: 3.2 x 3.0 cm abdominal aortic aneurysm. Right common iliac   artery is ectatic measuring 1.6 cm. Aortoiliac andvisceral artery   calcified plaque.  LYMPH NODES: No enlargement has by CT size criteria.  ABDOMINAL WALL: Small fat-containing umbilical hernia. Soft tissue edema.  BONES: Degenerative changes of the bones.    IMPRESSION:    Cirrhotic liver morphology. Enlarged liver, 24 cm in craniocaudal   dimension. Markedly heterogeneous attenuation of the liver parenchyma.   Liver lesions cannot be excluded. Recommend contrast enhanced abdominal   MRI for further evaluation. Correlate with AFP level. Sequelae of portal   hypertension including upper abdominal gastroesophageal and anterior   abdominal varices. Trace to small ascites.    Cholelithiasis with mild gallbladder wall edema and surrounding   pericholecystic fluid/ascites in the setting of cirrhosis. Cholecystitis   cannot be excluded.    3.2 x 3.0 cm abdominal aortic aneurysm. Right common iliac artery is   ectatic measuring 1.6 cm. Follow-up CT abdomen and pelvis is recommended   6 months for reevaluation. Multivessel coronary artery calcifications,   aortic root/valve calcification, and mitral annular calcification.    Enlarged prostate.    --- End of Report ---    < end of copied text >    Imaging Personally Reviewed:  YES/NO    Consultant(s) Notes Reviewed:   YES/ No    Care Discussed with Consultants :     Plan of care was discussed with patient and /or primary care giver; all questions and concerns were addressed and care was aligned with patient's wishes.

## 2024-11-14 NOTE — PROGRESS NOTE ADULT - PROBLEM SELECTOR PLAN 1
B/W painless jaundice x 3 days, with darker urine  - Drinks 1 pint vodka per day, last drink on 10/29  - RUQ US: No intrahepatic bile duct dilatation visible; the CBD is not visualized due to the limitations of this exam; Cholelithiasis without any convincing evidence for cholecystitis this time  - CTAP: Cholelithiasis with mild gallbladder wall edema and surrounding pericholecystic fluid/ascites in the setting of cirrhosis. Cholecystitis cannot be excluded  - MRCP did not show biliary obstruction  - Acute viral hepatitis panel neg  - CXR neg, Ascites is trace, unlikely to be sufficient for Dx paracentesis  - started on steroids and NAC for 4 days,   - Lille score today (day #4) predicting poor prognosis  - Declined transfer to [facility] at this time  - Cont Prednisone 40mg daily  - Lille score on today (day #7) 0.958 predicting poor prognosis  - Hepatology Dr Gillis following  - Pt. continues to decline transfer to [facility]   - Needs further inpatient f/u

## 2024-11-14 NOTE — PROGRESS NOTE ADULT - ATTENDING COMMENTS
Mr. Ramirez is a 63 y/o male from home w/ PMH of HTN and ETOH use presents to the hospital w/ 3 days of jaundice. He denies any abdominal pain, fever, chills, weight loss,  ongoing nausea or vomiting.  Patient reports that he retired in May'24 and since then he has been drinking 1 pint of vodka every day- his last drink was on Oct 31st. Admitted for Hyperbilirubinemia- all w/u so far negative and is being treated for alcoholic hepatitis     Patient was evaluated, no new complaints. T bili stable.     Labs reviewed- cbc, bmp, LFTs     PE as above     A/P:  #Suspected alcoholic hepatitis (AH) superimposed to cirrhosis (MetALD)  #Liver cirrhosis   #HTN  #ETOH dependence   #Hepatomegaly   #DVT ppx    Plan:  -Pt w/ hyperbilirubinemia, MRCP did not show obstruction. No portal  or hepatic vein thrombosis reported  -Appreciate hepatology recs, Lille score at day 7- 0.59- suggestive of poor prognosis. D/w hepatologist Dr. Gillis- advised transfer to [facility] for liver transplant evaluation. I spoke w/ patient but he refused transfer to [facility]/ liver transplant adamantly- risks were discussed. He wants to c/w steroids for now.   -C/w prednisolone   -Completed NAC   -Infectious w/u so far negative.  -No concern of KEREN given baseline mental status  -Given cirrhosis, patient would also need EGD for EV screening.  -BP stable, cont amlodipine  for now.   -C/w Lovenox SC .

## 2024-11-14 NOTE — PROGRESS NOTE ADULT - ASSESSMENT
64 Y M H/O HTN, HLD, DM, alcohol use disorder, presents with new onset painless jaundice. CT and US showing new cirrhosis, varices, gallstones, and could not r/o cholecystitis. Admitted for further evaluation.  S/P MRCP showing no biliary obstruction. Hepatology & GI consulted.   Hepatology started on steroids and NAC for 4 days, will need Lille score by day 4. monitor steroids for toxicity, monitor blood glucose level closely    11/11-Pt. was accepted to [facility] for evaluation/w/u for possible liver transplant ISO slow response to steroids based on Lille score.  Pt. declined transfer at this time, would like to consult with his family and also to wait for day #7 of steroids and repeat Lille score.   Pt.  was started on prednisolone 40 mg /day and NAC  (liver failure protocol) on 11/7.  Lille score on day #4 shows slow response.  Pt. was restarted on prednisone, Lille score would need to be checked at day #7 again.   Per hepatology,  if by day#7 good prognosis, c/w outpatient prednisone for total 28 days. He is completing NAC today.      11/12-Overall clinical presentation unchanged.  Prednisone changed to prednisolone per hepatology recc.  Will monitor Lille score on day #7 (11/14).    11/14-Pt. with Lille score revealing inadequate response to treatment.  Pt. continues to decline transfer to [facility] for liver transplant/evaluation.  Will remain inpatient for now.

## 2024-11-14 NOTE — PROGRESS NOTE ADULT - ASSESSMENT
Patient is a 64y old  Male who presents with a chief complaint of painless jaundice. Hepatology consulted for cirrhosis

## 2024-11-14 NOTE — PROGRESS NOTE ADULT - TIME BILLING
d/w patient and Dr. Gillis at length regarding plan and next steps. Risks were also discussed w/ patient

## 2024-11-14 NOTE — PROGRESS NOTE ADULT - PROBLEM SELECTOR PLAN 1
Lille score showing borderline not responsive  -LFTs downtrending  -continue with steroids for 30 days  -patient continues to refuse transfer or consideration of liver transplant (3) adequate

## 2024-11-14 NOTE — PROGRESS NOTE ADULT - PROBLEM SELECTOR PLAN 4
Drinks 1 pint vodka since 5/2024 (intermediate), reports social drinking prior to intermediate  Last drink on 10/29  No s/s etoh withdrawal  Monitor for [facility]  Social work consulted

## 2024-11-15 LAB
ALBUMIN SERPL ELPH-MCNC: 2.3 G/DL — LOW (ref 3.5–5)
ALP SERPL-CCNC: 143 U/L — HIGH (ref 40–120)
ALT FLD-CCNC: 112 U/L DA — HIGH (ref 10–60)
ANION GAP SERPL CALC-SCNC: 6 MMOL/L — SIGNIFICANT CHANGE UP (ref 5–17)
APTT BLD: 31.9 SEC — SIGNIFICANT CHANGE UP (ref 24.5–35.6)
AST SERPL-CCNC: 212 U/L — HIGH (ref 10–40)
BILIRUB SERPL-MCNC: 13.3 MG/DL — HIGH (ref 0.2–1.2)
BUN SERPL-MCNC: 20 MG/DL — HIGH (ref 7–18)
CALCIUM SERPL-MCNC: 9.3 MG/DL — SIGNIFICANT CHANGE UP (ref 8.4–10.5)
CHLORIDE SERPL-SCNC: 103 MMOL/L — SIGNIFICANT CHANGE UP (ref 96–108)
CO2 SERPL-SCNC: 24 MMOL/L — SIGNIFICANT CHANGE UP (ref 22–31)
CREAT SERPL-MCNC: 1.09 MG/DL — SIGNIFICANT CHANGE UP (ref 0.5–1.3)
EGFR: 77 ML/MIN/1.73M2 — SIGNIFICANT CHANGE UP
GAMMA INTERFERON BACKGROUND BLD IA-ACNC: 0.02 IU/ML — SIGNIFICANT CHANGE UP
GLUCOSE BLDC GLUCOMTR-MCNC: 206 MG/DL — HIGH (ref 70–99)
GLUCOSE BLDC GLUCOMTR-MCNC: 291 MG/DL — HIGH (ref 70–99)
GLUCOSE BLDC GLUCOMTR-MCNC: 307 MG/DL — HIGH (ref 70–99)
GLUCOSE BLDC GLUCOMTR-MCNC: 380 MG/DL — HIGH (ref 70–99)
GLUCOSE SERPL-MCNC: 196 MG/DL — HIGH (ref 70–99)
HCT VFR BLD CALC: 27.7 % — LOW (ref 39–50)
HGB BLD-MCNC: 9.7 G/DL — LOW (ref 13–17)
INR BLD: 1.45 RATIO — HIGH (ref 0.85–1.16)
M TB IFN-G BLD-IMP: ABNORMAL
M TB IFN-G CD4+ BCKGRND COR BLD-ACNC: 0.19 IU/ML — SIGNIFICANT CHANGE UP
M TB IFN-G CD4+CD8+ BCKGRND COR BLD-ACNC: 0.22 IU/ML — SIGNIFICANT CHANGE UP
MCHC RBC-ENTMCNC: 34.2 PG — HIGH (ref 27–34)
MCHC RBC-ENTMCNC: 35 G/DL — SIGNIFICANT CHANGE UP (ref 32–36)
MCV RBC AUTO: 97.5 FL — SIGNIFICANT CHANGE UP (ref 80–100)
NRBC # BLD: 0 /100 WBCS — SIGNIFICANT CHANGE UP (ref 0–0)
PLATELET # BLD AUTO: 305 K/UL — SIGNIFICANT CHANGE UP (ref 150–400)
POTASSIUM SERPL-MCNC: 4.5 MMOL/L — SIGNIFICANT CHANGE UP (ref 3.5–5.3)
POTASSIUM SERPL-SCNC: 4.5 MMOL/L — SIGNIFICANT CHANGE UP (ref 3.5–5.3)
PROT SERPL-MCNC: 7.4 G/DL — SIGNIFICANT CHANGE UP (ref 6–8.3)
PROTHROM AB SERPL-ACNC: 16.8 SEC — HIGH (ref 9.9–13.4)
QUANT TB PLUS MITOGEN MINUS NIL: 0.01 IU/ML — SIGNIFICANT CHANGE UP
RBC # BLD: 2.84 M/UL — LOW (ref 4.2–5.8)
RBC # FLD: 18.2 % — HIGH (ref 10.3–14.5)
SODIUM SERPL-SCNC: 133 MMOL/L — LOW (ref 135–145)
WBC # BLD: 16.22 K/UL — HIGH (ref 3.8–10.5)
WBC # FLD AUTO: 16.22 K/UL — HIGH (ref 3.8–10.5)

## 2024-11-15 PROCEDURE — 99232 SBSQ HOSP IP/OBS MODERATE 35: CPT

## 2024-11-15 RX ADMIN — Medication 1: at 21:35

## 2024-11-15 RX ADMIN — Medication 5: at 16:43

## 2024-11-15 RX ADMIN — PANTOPRAZOLE SODIUM 40 MILLIGRAM(S): 40 TABLET, DELAYED RELEASE ORAL at 05:43

## 2024-11-15 RX ADMIN — INSULIN GLARGINE 10 UNIT(S): 100 INJECTION, SOLUTION SUBCUTANEOUS at 21:36

## 2024-11-15 RX ADMIN — Medication 1 TABLET(S): at 11:51

## 2024-11-15 RX ADMIN — Medication 4 UNIT(S): at 16:44

## 2024-11-15 RX ADMIN — Medication 100 MILLIGRAM(S): at 11:51

## 2024-11-15 RX ADMIN — AMLODIPINE BESYLATE 10 MILLIGRAM(S): 10 TABLET ORAL at 05:43

## 2024-11-15 RX ADMIN — Medication 4: at 11:50

## 2024-11-15 RX ADMIN — Medication 2: at 07:50

## 2024-11-15 RX ADMIN — Medication 1 MILLIGRAM(S): at 11:50

## 2024-11-15 RX ADMIN — ENOXAPARIN SODIUM 40 MILLIGRAM(S): 30 INJECTION SUBCUTANEOUS at 18:38

## 2024-11-15 RX ADMIN — Medication 4 UNIT(S): at 07:51

## 2024-11-15 RX ADMIN — Medication 4 UNIT(S): at 11:51

## 2024-11-15 RX ADMIN — Medication 40 MILLIGRAM(S): at 06:01

## 2024-11-15 NOTE — PROGRESS NOTE ADULT - PROBLEM SELECTOR PROBLEM 9
Diabetes mellitus
HLD (hyperlipidemia)

## 2024-11-15 NOTE — PROGRESS NOTE ADULT - ASSESSMENT
64 Y M H/O HTN, HLD, DM, alcohol use disorder, presents with new onset painless jaundice. CT and US showing new cirrhosis, varices, gallstones, and could not r/o cholecystitis. Admitted for further evaluation.  S/P MRCP showing no biliary obstruction. Hepatology & GI consulted.   Hepatology started on steroids and NAC for 4 days, will need Lille score by day 4. monitor steroids for toxicity, monitor blood glucose level closely  11/11-Pt. was accepted to [facility] for evaluation/w/u for possible liver transplant ISO slow response to steroids based on Lille score.  Pt. declined transfer at this time, would like to consult with his family and also to wait for day #7 of steroids and repeat Lille score.   Pt.  was started on prednisolone 40 mg /day and NAC  (liver failure protocol) on 11/7.  Lille score on day #4 shows slow response.  Pt. was restarted on prednisone, Lille score would need to be checked at day #7 again.   Per hepatology,  if by day#7 good prognosis, c/w outpatient prednisone for total 28 days. He is completing NAC today.   11/12-Overall clinical presentation unchanged.  Prednisone changed to prednisolone per hepatology recc.  Will monitor Lille score on day #7 (11/14).  11/14-Pt. with Lille score revealing inadequate response to treatment.  Pt. continues to decline transfer to [facility] for liver transplant/evaluation.  Will remain inpatient for now.  11/15: LFTs remain elevated, pt continues to decline transplant eval in favor of outpatient follow up and full course of steroids

## 2024-11-15 NOTE — PROGRESS NOTE ADULT - SUBJECTIVE AND OBJECTIVE BOX
Patient is a 64y old  Male who presents with a chief complaint of jaundice (14 Nov 2024 15:46)      INTERVAL HPI/OVERNIGHT EVENTS: no new complaints    MEDICATIONS  (STANDING):  amLODIPine   Tablet 10 milliGRAM(s) Oral daily  enoxaparin Injectable 40 milliGRAM(s) SubCutaneous every 24 hours  folic acid 1 milliGRAM(s) Oral daily  influenza   Vaccine 0.5 milliLiter(s) IntraMuscular once  insulin glargine Injectable (LANTUS) 10 Unit(s) SubCutaneous at bedtime  insulin lispro (ADMELOG) corrective regimen sliding scale   SubCutaneous at bedtime  insulin lispro (ADMELOG) corrective regimen sliding scale   SubCutaneous three times a day before meals  insulin lispro Injectable (ADMELOG) 4 Unit(s) SubCutaneous three times a day before meals  multivitamin 1 Tablet(s) Oral daily  pantoprazole    Tablet 40 milliGRAM(s) Oral before breakfast  thiamine 100 milliGRAM(s) Oral daily    MEDICATIONS  (PRN):  aluminum hydroxide/magnesium hydroxide/simethicone Suspension 30 milliLiter(s) Oral every 6 hours PRN Dyspepsia      __________________________________________________  REVIEW OF SYSTEMS:    CONSTITUTIONAL: No fever,   EYES: no acute visual disturbances  NECK: No pain or stiffness  RESPIRATORY: No cough; No shortness of breath  CARDIOVASCULAR: No chest pain, no palpitations  GASTROINTESTINAL: No pain. No nausea or vomiting; No diarrhea   NEUROLOGICAL: No headache or numbness, no tremors  MUSCULOSKELETAL: No joint pain, no muscle pain  GENITOURINARY: no dysuria, no frequency, no hesitancy  PSYCHIATRY: no depression , no anxiety  ALL OTHER  ROS negative      Vital Signs Last 24 Hrs  T(C): 37.1 (15 Nov 2024 05:15), Max: 37.2 (14 Nov 2024 20:50)  T(F): 98.7 (15 Nov 2024 05:15), Max: 98.9 (14 Nov 2024 20:50)  HR: 103 (15 Nov 2024 05:15) (90 - 103)  BP: 128/72 (15 Nov 2024 05:15) (123/70 - 128/72)  BP(mean): 88 (14 Nov 2024 20:50) (88 - 88)  RR: 17 (15 Nov 2024 05:15) (16 - 18)  SpO2: 96% (15 Nov 2024 05:15) (96% - 98%)    Parameters below as of 15 Nov 2024 05:15  Patient On (Oxygen Delivery Method): room air        ________________________________________________  PHYSICAL EXAM:  GENERAL: NAD  HEENT: Normocephalic;  conjunctivae and sclerae clear; moist mucous membranes;   NECK : supple  CHEST/LUNG: Clear to auscultation bilaterally with good air entry   HEART: S1 S2  regular; no murmurs, gallops or rubs  ABDOMEN: Soft, Nontender, Nondistended; Bowel sounds present  EXTREMITIES: no cyanosis; no edema; no calf tenderness  SKIN: warm and dry; no rash  NERVOUS SYSTEM:  Awake and alert; Oriented  to place, person and time ; no new deficits    _________________________________________________  LABS:                        9.7    16.22 )-----------( 305      ( 15 Nov 2024 06:30 )             27.7     11-15    133[L]  |  103  |  20[H]  ----------------------------<  196[H]  4.5   |  24  |  1.09    Ca    9.3      15 Nov 2024 06:30    TPro  7.4  /  Alb  2.3[L]  /  TBili  13.3[H]  /  DBili  x   /  AST  212[H]  /  ALT  112[H]  /  AlkPhos  143[H]  11-15    PT/INR - ( 15 Nov 2024 06:30 )   PT: 16.8 sec;   INR: 1.45 ratio         PTT - ( 15 Nov 2024 06:30 )  PTT:31.9 sec  Urinalysis Basic - ( 15 Nov 2024 06:30 )    Color: x / Appearance: x / SG: x / pH: x  Gluc: 196 mg/dL / Ketone: x  / Bili: x / Urobili: x   Blood: x / Protein: x / Nitrite: x   Leuk Esterase: x / RBC: x / WBC x   Sq Epi: x / Non Sq Epi: x / Bacteria: x      CAPILLARY BLOOD GLUCOSE      POCT Blood Glucose.: 206 mg/dL (15 Nov 2024 07:44)  POCT Blood Glucose.: 213 mg/dL (14 Nov 2024 21:10)  POCT Blood Glucose.: 353 mg/dL (14 Nov 2024 16:25)  POCT Blood Glucose.: 375 mg/dL (14 Nov 2024 11:18)      RADIOLOGY & ADDITIONAL TESTS:    Imaging Personally Reviewed:  YES/NO    Consultant(s) Notes Reviewed:   YES/ No    Care Discussed with Consultants :     Plan of care was discussed with patient and /or primary care giver; all questions and concerns were addressed and care was aligned with patient's wishes.       Patient is a 64y old  Male who presents with a chief complaint of jaundice (14 Nov 2024 15:46)      INTERVAL HPI/OVERNIGHT EVENTS: Pt seen at bedside with no new complaints    MEDICATIONS  (STANDING):  amLODIPine   Tablet 10 milliGRAM(s) Oral daily  enoxaparin Injectable 40 milliGRAM(s) SubCutaneous every 24 hours  folic acid 1 milliGRAM(s) Oral daily  influenza   Vaccine 0.5 milliLiter(s) IntraMuscular once  insulin glargine Injectable (LANTUS) 10 Unit(s) SubCutaneous at bedtime  insulin lispro (ADMELOG) corrective regimen sliding scale   SubCutaneous at bedtime  insulin lispro (ADMELOG) corrective regimen sliding scale   SubCutaneous three times a day before meals  insulin lispro Injectable (ADMELOG) 4 Unit(s) SubCutaneous three times a day before meals  multivitamin 1 Tablet(s) Oral daily  pantoprazole    Tablet 40 milliGRAM(s) Oral before breakfast  thiamine 100 milliGRAM(s) Oral daily    MEDICATIONS  (PRN):  aluminum hydroxide/magnesium hydroxide/simethicone Suspension 30 milliLiter(s) Oral every 6 hours PRN Dyspepsia    __________________________________________________  REVIEW OF SYSTEMS:    CONSTITUTIONAL: No fever,   EYES: no acute visual disturbances  NECK: No pain or stiffness  RESPIRATORY: No cough; No shortness of breath  CARDIOVASCULAR: No chest pain, no palpitations  GASTROINTESTINAL: No pain. No nausea or vomiting; No diarrhea   NEUROLOGICAL: No headache or numbness, no tremors  MUSCULOSKELETAL: No joint pain, no muscle pain  GENITOURINARY: no dysuria, no frequency, no hesitancy  PSYCHIATRY: no depression , no anxiety  ALL OTHER  ROS negative      Vital Signs Last 24 Hrs  T(C): 37.1 (15 Nov 2024 05:15), Max: 37.2 (14 Nov 2024 20:50)  T(F): 98.7 (15 Nov 2024 05:15), Max: 98.9 (14 Nov 2024 20:50)  HR: 103 (15 Nov 2024 05:15) (90 - 103)  BP: 128/72 (15 Nov 2024 05:15) (123/70 - 128/72)  BP(mean): 88 (14 Nov 2024 20:50) (88 - 88)  RR: 17 (15 Nov 2024 05:15) (16 - 18)  SpO2: 96% (15 Nov 2024 05:15) (96% - 98%)    Parameters below as of 15 Nov 2024 05:15  Patient On (Oxygen Delivery Method): room air  ________________________________________________  PHYSICAL EXAM:  GENERAL: NAD  HEENT: Normocephalic;  conjunctivae and sclerae clear; moist mucous membranes;   NECK : supple  CHEST/LUNG: Clear to auscultation bilaterally with good air entry   HEART: S1 S2  regular; no murmurs, gallops or rubs  ABDOMEN: Soft, Nontender, Nondistended; Bowel sounds present  EXTREMITIES: no cyanosis; no edema; no calf tenderness  SKIN: jaundiced   NERVOUS SYSTEM:  Awake and alert; Oriented  to place, person and time ; no new deficits  _________________________________________________  LABS:                        9.7    16.22 )-----------( 305      ( 15 Nov 2024 06:30 )             27.7     11-15    133[L]  |  103  |  20[H]  ----------------------------<  196[H]  4.5   |  24  |  1.09    Ca    9.3      15 Nov 2024 06:30    TPro  7.4  /  Alb  2.3[L]  /  TBili  13.3[H]  /  DBili  x   /  AST  212[H]  /  ALT  112[H]  /  AlkPhos  143[H]  11-15    PT/INR - ( 15 Nov 2024 06:30 )   PT: 16.8 sec;   INR: 1.45 ratio         PTT - ( 15 Nov 2024 06:30 )  PTT:31.9 sec  Urinalysis Basic - ( 15 Nov 2024 06:30 )    Color: x / Appearance: x / SG: x / pH: x  Gluc: 196 mg/dL / Ketone: x  / Bili: x / Urobili: x   Blood: x / Protein: x / Nitrite: x   Leuk Esterase: x / RBC: x / WBC x   Sq Epi: x / Non Sq Epi: x / Bacteria: x      CAPILLARY BLOOD GLUCOSE    POCT Blood Glucose.: 206 mg/dL (15 Nov 2024 07:44)  POCT Blood Glucose.: 213 mg/dL (14 Nov 2024 21:10)  POCT Blood Glucose.: 353 mg/dL (14 Nov 2024 16:25)  POCT Blood Glucose.: 375 mg/dL (14 Nov 2024 11:18)      RADIOLOGY & ADDITIONAL TESTS:  < from: MR MRCP w/wo IV Cont (11.05.24 @ 14:28) >  ACC: [id number] EXAM:  MR MRCP WAW IC   ORDERED BY: MUSA POOL     PROCEDURE DATE:  11/05/2024          INTERPRETATION:  CLINICAL INFORMATION: Rule out biliary obstruction.   Jaundice.    COMPARISON: CT dated 11/02/2024.    CONTRAST/COMPLICATIONS:  IV Contrast: Gadavist  7.5 cc administered   0 cc discarded  Oral Contrast: NONE  Complications: None reported at time of study completion    PROCEDURE:  MRI of the abdomen was performed.  MRCP was performed.    FINDINGS:  LOWER CHEST: Trace atelectasis right lung base.    LIVER: Enlarged measuring approximately 24 cm. Nodular contour. Fatty   change in the periportal distribution. No evidence of any arterial phase   hyperenhancing mass to suggest HCC.  BILE DUCTS: Normal caliber.  GALLBLADDER: Combination of cholelithiasis and adenomyomatosis.  SPLEEN: Enlarged measuring approximately 14.5 cm.  PANCREAS: Within normal limits.  ADRENALS: Within normal limits.  KIDNEYS/URETERS: Within normal limits.    VISUALIZED PORTIONS:  BOWEL: Within normallimits.  PERITONEUM: Trace ascites and mesenteric edema.  VESSELS: Portal venous collaterals including lower esophageal varices.  RETROPERITONEUM/LYMPH NODES: No lymphadenopathy.  ABDOMINAL WALL: Within normal limits.  BONES: Within normal limits.    IMPRESSION:  Cirrhosis with splenomegaly and portal venous collaterals including the   lower esophageal varices. Trace ascites.  Periportal distribution of hepatic steatosis. No focal hepatic mass   lesion.  Cholelithiasis and adenomyomatosis.  No biliary obstruction.    --- End of Report ---      < from: Xray Chest 1 View- PORTABLE-Urgent (Xray Chest 1 View- PORTABLE-Urgent .) (11.04.24 @ 19:32) >  ACC: [id number] EXAM:  XR CHEST PORTABLE URGENT 1V   ORDERED BY: MUSA POOL     PROCEDURE DATE:  11/04/2024        INTERPRETATION:  Clinical history: 64-year-old male, rule out pneumonia.    Portable view of the chest is correlated with the MRCPof 11/5/2024.    FINDINGS: Normal cardiac silhouette and normal pulmonary vasculature with   no consolidation, effusion, pneumothorax or acute osseous findings.    IMPRESSION:  No acute radiographic findings, in particular no pneumonia    --- End of Report ---      Imaging Personally Reviewed:  YES    Consultant(s) Notes Reviewed:   YES    Plan of care was discussed with patient and /or primary care giver; all questions and concerns were addressed and care was aligned with patient's wishes.       Patient is a 64y old  Male who presents with a chief complaint of jaundice (14 Nov 2024 15:46)      INTERVAL HPI/OVERNIGHT EVENTS: Pt seen at bedside with no new complaints    MEDICATIONS  (STANDING):  amLODIPine   Tablet 10 milliGRAM(s) Oral daily  enoxaparin Injectable 40 milliGRAM(s) SubCutaneous every 24 hours  folic acid 1 milliGRAM(s) Oral daily  influenza   Vaccine 0.5 milliLiter(s) IntraMuscular once  insulin glargine Injectable (LANTUS) 10 Unit(s) SubCutaneous at bedtime  insulin lispro (ADMELOG) corrective regimen sliding scale   SubCutaneous at bedtime  insulin lispro (ADMELOG) corrective regimen sliding scale   SubCutaneous three times a day before meals  insulin lispro Injectable (ADMELOG) 4 Unit(s) SubCutaneous three times a day before meals  multivitamin 1 Tablet(s) Oral daily  pantoprazole    Tablet 40 milliGRAM(s) Oral before breakfast  thiamine 100 milliGRAM(s) Oral daily    MEDICATIONS  (PRN):  aluminum hydroxide/magnesium hydroxide/simethicone Suspension 30 milliLiter(s) Oral every 6 hours PRN Dyspepsia    __________________________________________________  REVIEW OF SYSTEMS:    CONSTITUTIONAL: No fever,   EYES: no acute visual disturbances  NECK: No pain or stiffness  RESPIRATORY: No cough; No shortness of breath  CARDIOVASCULAR: No chest pain, no palpitations  GASTROINTESTINAL: No pain. No nausea or vomiting; No diarrhea   NEUROLOGICAL: No headache or numbness, no tremors  MUSCULOSKELETAL: No joint pain, no muscle pain  GENITOURINARY: no dysuria, no frequency, no hesitancy  PSYCHIATRY: no depression , no anxiety  ALL OTHER  ROS negative      Vital Signs Last 24 Hrs  T(C): 37.1 (15 Nov 2024 05:15), Max: 37.2 (14 Nov 2024 20:50)  T(F): 98.7 (15 Nov 2024 05:15), Max: 98.9 (14 Nov 2024 20:50)  HR: 103 (15 Nov 2024 05:15) (90 - 103)  BP: 128/72 (15 Nov 2024 05:15) (123/70 - 128/72)  BP(mean): 88 (14 Nov 2024 20:50) (88 - 88)  RR: 17 (15 Nov 2024 05:15) (16 - 18)  SpO2: 96% (15 Nov 2024 05:15) (96% - 98%)    Parameters below as of 15 Nov 2024 05:15  Patient On (Oxygen Delivery Method): room air  ________________________________________________  PHYSICAL EXAM:  GENERAL: NAD  HEENT: Normocephalic;  conjunctivae and sclerae clear; moist mucous membranes;   NECK : supple  CHEST/LUNG: Clear to auscultation bilaterally with good air entry   HEART: S1 S2  regular; no murmurs, gallops or rubs  ABDOMEN: Soft, Nontender, Nondistended; Bowel sounds present  EXTREMITIES: no cyanosis; no edema; no calf tenderness  SKIN: jaundiced   NERVOUS SYSTEM:  Awake and alert; Oriented  to place, person and time ; no new deficits  _________________________________________________  LABS:                        9.7    16.22 )-----------( 305      ( 15 Nov 2024 06:30 )             27.7     11-15    133[L]  |  103  |  20[H]  ----------------------------<  196[H]  4.5   |  24  |  1.09    Ca    9.3      15 Nov 2024 06:30    TPro  7.4  /  Alb  2.3[L]  /  TBili  13.3[H]  /  DBili  x   /  AST  212[H]  /  ALT  112[H]  /  AlkPhos  143[H]  11-15    PT/INR - ( 15 Nov 2024 06:30 )   PT: 16.8 sec;   INR: 1.45 ratio         PTT - ( 15 Nov 2024 06:30 )  PTT:31.9 sec  Urinalysis Basic - ( 15 Nov 2024 06:30 )    Color: x / Appearance: x / SG: x / pH: x  Gluc: 196 mg/dL / Ketone: x  / Bili: x / Urobili: x   Blood: x / Protein: x / Nitrite: x   Leuk Esterase: x / RBC: x / WBC x   Sq Epi: x / Non Sq Epi: x / Bacteria: x      CAPILLARY BLOOD GLUCOSE    POCT Blood Glucose.: 206 mg/dL (15 Nov 2024 07:44)  POCT Blood Glucose.: 213 mg/dL (14 Nov 2024 21:10)  POCT Blood Glucose.: 353 mg/dL (14 Nov 2024 16:25)  POCT Blood Glucose.: 375 mg/dL (14 Nov 2024 11:18)    RADIOLOGY & ADDITIONAL TESTS:  < from: MR MRCP w/wo IV Cont (11.05.24 @ 14:28) >  ACC: [id number] EXAM:  MR MRCP WAW IC   ORDERED BY: MUSA POOL     PROCEDURE DATE:  11/05/2024        INTERPRETATION:  CLINICAL INFORMATION: Rule out biliary obstruction.   Jaundice.    COMPARISON: CT dated 11/02/2024.    CONTRAST/COMPLICATIONS:  IV Contrast: Gadavist  7.5 cc administered   0 cc discarded  Oral Contrast: NONE  Complications: None reported at time of study completion    PROCEDURE:  MRI of the abdomen was performed.  MRCP was performed.    FINDINGS:  LOWER CHEST: Trace atelectasis right lung base.    LIVER: Enlarged measuring approximately 24 cm. Nodular contour. Fatty   change in the periportal distribution. No evidence of any arterial phase   hyperenhancing mass to suggest HCC.  BILE DUCTS: Normal caliber.  GALLBLADDER: Combination of cholelithiasis and adenomyomatosis.  SPLEEN: Enlarged measuring approximately 14.5 cm.  PANCREAS: Within normal limits.  ADRENALS: Within normal limits.  KIDNEYS/URETERS: Within normal limits.    VISUALIZED PORTIONS:  BOWEL: Within normallimits.  PERITONEUM: Trace ascites and mesenteric edema.  VESSELS: Portal venous collaterals including lower esophageal varices.  RETROPERITONEUM/LYMPH NODES: No lymphadenopathy.  ABDOMINAL WALL: Within normal limits.  BONES: Within normal limits.    IMPRESSION:  Cirrhosis with splenomegaly and portal venous collaterals including the   lower esophageal varices. Trace ascites.  Periportal distribution of hepatic steatosis. No focal hepatic mass   lesion.  Cholelithiasis and adenomyomatosis.  No biliary obstruction.    --- End of Report ---      < from: Xray Chest 1 View- PORTABLE-Urgent (Xray Chest 1 View- PORTABLE-Urgent .) (11.04.24 @ 19:32) >  ACC: [id number] EXAM:  XR CHEST PORTABLE URGENT 1V   ORDERED BY: MUSA POOL     PROCEDURE DATE:  11/04/2024        INTERPRETATION:  Clinical history: 64-year-old male, rule out pneumonia.    Portable view of the chest is correlated with the MRCPof 11/5/2024.    FINDINGS: Normal cardiac silhouette and normal pulmonary vasculature with   no consolidation, effusion, pneumothorax or acute osseous findings.    IMPRESSION:  No acute radiographic findings, in particular no pneumonia    --- End of Report ---      Imaging Personally Reviewed:  YES    Consultant(s) Notes Reviewed:   YES    Plan of care was discussed with patient and /or primary care giver; all questions and concerns were addressed and care was aligned with patient's wishes.

## 2024-11-15 NOTE — PROGRESS NOTE ADULT - PROBLEM SELECTOR PROBLEM 1
Liver cirrhosis
Painless jaundice
Liver cirrhosis
Painless jaundice
Liver cirrhosis

## 2024-11-15 NOTE — PROGRESS NOTE ADULT - PROBLEM SELECTOR PLAN 11
DVT PPX: Hold iso liver cirrhosis  GI PPX: PPI DVT ppx: chemical ppx held iso liver cirrhosis  GI ppx: Protonix DVT ppx: Lovenox, monitor coag studies given cirrhosis   GI ppx: Protonix

## 2024-11-15 NOTE — PROGRESS NOTE ADULT - PROBLEM SELECTOR PROBLEM 7
Diabetes mellitus
Hyponatremia
Hyponatremia
TODD (acute kidney injury)
Hyponatremia
TODD (acute kidney injury)
Hyponatremia
Diabetes mellitus
Hyponatremia

## 2024-11-15 NOTE — PROGRESS NOTE ADULT - PROBLEM SELECTOR PROBLEM 4
Esophageal varices in cirrhosis
Acute UTI
Acute UTI
Alcohol use disorder
Acute UTI
Esophageal varices in cirrhosis

## 2024-11-15 NOTE — PROGRESS NOTE ADULT - PROBLEM SELECTOR PLAN 5
- CT shows gallstone  - No signs of acute cholecystitis> neg collier sign  - MRCP shows Cholelithiasis and adenomyomatosis. No biliary obstruction. - CT Abd showed gallstones  - No signs of acute cholecystitis, neg collier sign  - MRCP shows Cholelithiasis and adenomyomatosis. No biliary obstruction

## 2024-11-15 NOTE — PROGRESS NOTE ADULT - PROBLEM SELECTOR PROBLEM 10
HTN (hypertension)
HTN (hypertension)
HLD (hyperlipidemia)
HTN (hypertension)
HTN (hypertension)

## 2024-11-15 NOTE — PROGRESS NOTE ADULT - PROBLEM SELECTOR PROBLEM 2
Transaminitis
Transaminitis
Acute UTI
Acute UTI
Transaminitis
Esophageal varices in cirrhosis
Transaminitis
Transaminitis
Esophageal varices in cirrhosis

## 2024-11-15 NOTE — PROGRESS NOTE ADULT - PROBLEM SELECTOR PROBLEM 11
HTN (hypertension)
Prophylactic measure
HTN (hypertension)
HTN (hypertension)
Prophylactic measure
HTN (hypertension)
HTN (hypertension)

## 2024-11-15 NOTE — PROGRESS NOTE ADULT - PROBLEM SELECTOR PROBLEM 6
Gall stones
Gall stones
Hyponatremia
Gall stones
Hyponatremia
Gall stones
TODD (acute kidney injury)
Gall stones
TODD (acute kidney injury)

## 2024-11-15 NOTE — PROGRESS NOTE ADULT - PROBLEM SELECTOR PLAN 1
P/w painless jaundice x 3 days and darker urine  - Drinks 1 pint vodka per day, last drink on 10/29  - RUQ US: No intrahepatic bile duct dilatation visible; the CBD is not visualized due to the limitations of this exam; Cholelithiasis without any convincing evidence for cholecystitis this time  - CTAP: Cholelithiasis with mild gallbladder wall edema and surrounding pericholecystic fluid/ascites in the setting of cirrhosis. Cholecystitis cannot be excluded  - MRCP did not show biliary obstruction  - Acute viral hepatitis panel neg  - CXR neg, Ascites is trace, unlikely to be sufficient for Dx paracentesis  - started on steroids and NAC for 4 days,   - Lille score today (day #4) predicting poor prognosis  - Declined transfer to [facility] at this time  - C/w Prednisone 40mg daily  - Lille score on today (day #7) 0.958 predicting poor prognosis  - Hepatology Dr Gillis following  - Pt. continues to decline transfer to [facility]   - Needs further inpatient f/u P/w painless jaundice x 3 days and darker urine  - Drinks 1 pint vodka per day, last drink on 10/29  - RUQ US: No intrahepatic bile duct dilatation visible; the CBD is not visualized due to the limitations of this exam; Cholelithiasis without any convincing evidence for cholecystitis this time  - CTAP: Cholelithiasis with mild gallbladder wall edema and surrounding pericholecystic fluid/ascites in the setting of cirrhosis. Cholecystitis cannot be excluded  - MRCP did not show biliary obstruction  - Acute viral hepatitis panel neg  - CXR neg, Ascites is trace, unlikely to be sufficient for Dx paracentesis  - S/P steroids and NAC for 4 days  - Lille score today (day #4) predicting poor prognosis  - Declined transfer to [facility] at this time  - Lille score 11/14 (day #7) 0.958 predicting poor prognosis  - C/w Prednisolone 30mg daily  - Hepatology Dr Gillis following  - Pt. continues to decline transfer to [facility]   - Needs further inpatient f/u

## 2024-11-15 NOTE — PROGRESS NOTE ADULT - ASSESSMENT
1. Painless jaundice  2. Transaminitis  3. Alcoholic cirrhosis  4. Portal HTN  5. Paraesophageal/perigastric varices  6. Cholelithiasis with gallbladder wall edema and pericholecystic fluid  7. Cholecystitis less likely  8. Ascites  9. ETOH abuse  10. Anemia  11. No evidence of acute GI bleeding  12. S/p MRCP  13. No extrahepatic biliary obstruction    Suggestions:    1. Follow up LFT's  2. Hepatology follow up  3. Continue Steroid therapy as per hepatology  4. B-blocker therapy  5. Lactulose daily  6. Protonix 40mg daily8.    7. Monitor H/H  8. Transfuse PRBC as needed   9. Therapeutic paracentesis as needed  10. Patient does not consider liver transplant  11. DVT prophylaxis

## 2024-11-15 NOTE — PROGRESS NOTE ADULT - PROBLEM SELECTOR PLAN 8
steroid induced  -Cont Lantus 10U HS  -Added Admelog 4U with meals  -Cont FS AC&HS with ISS Steroid induced  -C/w Lantus 10units HS  -C/w Admelog 4U with meals  -C/w FS ACHS with ISSC

## 2024-11-15 NOTE — PROGRESS NOTE ADULT - PROBLEM SELECTOR PROBLEM 8
This 73-year-old female with a past medical significant medical dependence presented to the ER on 4/14/2023 after she had a ground-level fall.  Report that she was walking to the bathroom yesterday had a fall; unable to bear weight on the left side.    Imaging showed left hip fracture.  Orthopedic surgery was consulted, patient underwent Open treatment with intramedullary nailing, left intertrochanteric femur fracture. Patient can bear weight as tolerated.  Patient is ready for admission, recommend postacute.  Physiatry evaluated patient, stated patient is a good candidate for acute rehab.  At this time patient medically stable to be discharged to acute rehab.  Hospital course complicated acute blood loss anemia; today her hemoglobin is noted to be 7.2, monitor    Of note her hemoglobin noted to be 12 on the day of surgery, today hemoglobin noted to be 7.2    Of note patient phosphorus noted to be on the lower side, started on Neutra-Phos 500 GI 3 times daily for 4 doses.  Patient need to have repeat CBC tomorrow along with repeat phosphorus..      
Diabetes mellitus
TODD (acute kidney injury)
Diabetes mellitus
Hyperglycemia
TODD (acute kidney injury)
TODD (acute kidney injury)
Hyperglycemia
TODD (acute kidney injury)
TODD (acute kidney injury)

## 2024-11-15 NOTE — PROGRESS NOTE ADULT - PROBLEM SELECTOR PLAN 3
No signs of bleeding currently  - Pt will likely need EGD per GI  - GI following
UA +  Ucx neg  D/antonio Rocephin in light of neg Ucx
Most likely secondary to alcohol use  - As above
UA +  Ucx neg  D/antonio Rocephin in light of neg Ucx
No signs of bleeding currently  - Pt will likely need EGD per GI  - GI following  - GI rec Diagnostic and therapeutic paracentesis
No signs of bleeding currently  - Pt will likely need EGD per GI  - GI following
No signs of bleeding currently  - Pt will likely need EGD per GI  - GI following
No signs of bleeding currently  - Pt will likely need EGD per GI  - GI following  - GI rec Diagnostic and therapeutic paracentesis
Most likely secondary to alcohol use  - As above

## 2024-11-15 NOTE — PROGRESS NOTE ADULT - PROBLEM SELECTOR PLAN 7
Home med: Mounjaro  Started ISS  A1c 5.1  Persistent hyperglycemia ISO steroids, started Lantus 10U HS for now  Cont FS AC&HS Home med: Mounjaro  -Hold home med while inpt  -A1c 5.1  -C/w FS w/ ISSC ACHS  -Persistent hyperglycemia ISO steroids, started Lantus 10U HS for now

## 2024-11-15 NOTE — PROGRESS NOTE ADULT - PROBLEM SELECTOR PLAN 10
Home meds: amlodipine 10mg QD, valsartan 320 mg QD  Start amlodipine 10 QD  Hold valsartan given TODD Home meds: amlodipine 10mg QD, valsartan 320 mg QD  -C/w Amlodipine 10 daily  -C/w Valsartan given TODD Home meds: amlodipine 10mg QD, valsartan 320 mg QD  -C/w Amlodipine 10mg daily  -Valsartan held given TODD  -BP acceptable  - Monitor BP and adjust as needed

## 2024-11-15 NOTE — PROGRESS NOTE ADULT - SUBJECTIVE AND OBJECTIVE BOX
[   ] ICU                                          [   ] CCU                                      [ X  ] Medical Floor    Patient is a 64 year old male with jaundice. GI consulted to evaluate.         64 year old male with past medical history significant for HTN, DM, ETOH abuse, Hyperlipidemia, presented to the emergency room with 3 days history of jaundice associated with non bloody vomiting and dark color urine. Patient reported drinking ETOH heavily over past 6 months (1 pint vodka daily). Patient denies abdominal pain, hematemesis, hematochezia, melena, fever, chills, chest pain, SOB, cough, hematuria, dysuria or diarrhea.     Patient appears comfortable. No new complaints reported, No abdominal pain, N/V, hematemesis, hematochezia, melena, fever, chills, chest pain, SOB, cough or diarrhea reported.      PAIN MANAGEMENT:  Pain Scale:                0 /10  Pain Location:         PAST MEDICAL HISTORY    HTN (hypertension)    Hyperlipidemia     Diabetes mellitus    ETOH abuse        PAST SURGICAL HISTORY    No significant surgical history reported        Allergies    No Known Allergies    Intolerances  None         SOCIAL HISTORY  Advanced Directives:       [ X ] Full Code       [  ] DNR  Marital Status:         [  ] M      [ X ] S      [  ] D       [  ] W  Children:       [ X ] Yes      [  ] No  Occupation:        [  ] Employed       [ X ] Unemployed       [  ] Retired  Diet:       [ X ] Regular       [  ] PEG feeding          [  ] NG tube feeding  Drug Use:           [ X ] Patient denied          [  ] Yes  Alcohol:           [  ] No             [  ] Yes (socially)         [ X ] Yes (chronic)  Tobacco:           [  ] Yes           [ X ] No      FAMILY HISTORY  [ X ] Heart Disease            [ X ] Diabetes             [ X ] HTN             [  ] Colon Cancer             [  ] Stomach Cancer              [  ] Pancreatic Cancer          VITALS   Vital Signs Last 24 Hrs  T(C): 36.9 (11-15-24 @ 14:08), Max: 37.2 (11-14-24 @ 20:50)  T(F): 98.5 (11-15-24 @ 14:08), Max: 98.9 (11-14-24 @ 20:50)  HR: 98 (11-15-24 @ 14:08) (97 - 103)  BP: 132/74 (11-15-24 @ 14:08) (123/70 - 132/74)  BP(mean): 88 (11-14-24 @ 20:50) (88 - 88)  RR: 16 (11-15-24 @ 14:08) (16 - 18)  SpO2: 97% (11-15-24 @ 14:08) (96% - 98%)        MEDICATIONS  (STANDING):  amLODIPine   Tablet 10 milliGRAM(s) Oral daily  enoxaparin Injectable 40 milliGRAM(s) SubCutaneous every 24 hours  folic acid 1 milliGRAM(s) Oral daily  influenza   Vaccine 0.5 milliLiter(s) IntraMuscular once  insulin glargine Injectable (LANTUS) 10 Unit(s) SubCutaneous at bedtime  insulin lispro (ADMELOG) corrective regimen sliding scale   SubCutaneous three times a day before meals  insulin lispro (ADMELOG) corrective regimen sliding scale   SubCutaneous at bedtime  insulin lispro Injectable (ADMELOG) 4 Unit(s) SubCutaneous three times a day before meals  multivitamin 1 Tablet(s) Oral daily  pantoprazole    Tablet 40 milliGRAM(s) Oral before breakfast  thiamine 100 milliGRAM(s) Oral daily    MEDICATIONS  (PRN):  aluminum hydroxide/magnesium hydroxide/simethicone Suspension 30 milliLiter(s) Oral every 6 hours PRN Dyspepsia                            9.7    16.22 )-----------( 305      ( 15 Nov 2024 06:30 )             27.7       11-15    133[L]  |  103  |  20[H]  ----------------------------<  196[H]  4.5   |  24  |  1.09    Ca    9.3      15 Nov 2024 06:30    TPro  7.4  /  Alb  2.3[L]  /  TBili  13.3[H]  /  DBili  x   /  AST  212[H]  /  ALT  112[H]  /  AlkPhos  143[H]  11-15      PT/INR - ( 15 Nov 2024 06:30 )   PT: 16.8 sec;   INR: 1.45 ratio         PTT - ( 15 Nov 2024 06:30 )  PTT:31.9 sec

## 2024-11-15 NOTE — PROGRESS NOTE ADULT - PROBLEM SELECTOR PLAN 9
Previously took Lipitor  stopped 6m ago as per his PCP rx. Previously on Lipitor  -Stopped 6 months ago per his PCP instruction

## 2024-11-15 NOTE — PROGRESS NOTE ADULT - NS ATTEND AMEND GEN_ALL_CORE FT
Mr. Ramirez is a 65 y/o male from home w/ PMH of HTN and ETOH use presents to the hospital w/ 3 days of jaundice. He denies any abdominal pain, fever, chills, weight loss,  ongoing nausea or vomiting.  Patient reports that he retired in May'24 and since then he has been drinking 1 pint of vodka every day- his last drink was on Oct 31st. Admitted for Hyperbilirubinemia- all w/u so far negative and is being treated for alcoholic hepatitis     Patient was evaluated, no new complaints. T bili elevated. No new complaints     Labs reviewed- cbc, bmp, LFTs     PE as above     A/P:  #Suspected alcoholic hepatitis (AH) superimposed to cirrhosis (MetALD)  #Liver cirrhosis   #HTN  #ETOH dependence   #Hepatomegaly   #DVT ppx    Plan:  -Pt w/ hyperbilirubinemia, MRCP did not show obstruction. No portal  or hepatic vein thrombosis reported  -T bili up-trending  today. Patient still refusing transplant, risks explained.   -Would c/w prednisolone   -Completed NAC   -Infectious w/u so far negative.  -No concern of KEREN given baseline mental status  -Given cirrhosis, patient would also need EGD for EV screening.  -BP stable, cont amlodipine  for now.   -C/w Lovenox SC .
Agree with above.    I saw and examined the patient on 11-14-24. I agree with the findings and plan of care as documented in the fellow/resident/medical student/nurse practitioner/physician assistant.    Today we are treating the patient for:   EtOH induced alcoholic hepatitis    ***General note with plan / recommendation:   History as above. Patient on day 7 of steorid with Aisha score of 0.59 which is borderline not responsive however patient's Tbili did go down from 17 to 11.  He remains with normal mental status. Would continue steroid for 30 more days and continue with trending LFTs.  Spoke to patient about abstaining from EtOH and transplant evaluation.  He has no family support in [place]. His daughter is in [facility] and wife is out of the country. He also had EtOH 4 days prior to admission.  He does have insurance and is a [place] citizen. Overall he is not a great transplant candidate. Regardless he is refusing transfer, transplant evaluation anyways.      Billing:  I have reviewed records from labs, imaging    Other:  - Thank you for involving us in the care of this patient. If you have any questions regarding the plan of care please do not hesitate to call us back.
Mr. Ramirez is a 63 y/o male from home w/ PMH of HTN and ETOH use presents to the hospital w/ 3 days of jaundice. He denies any abdominal pain, fever, chills, weight loss,  ongoing nausea or vomiting.  Patient reports that he retired in May'24 and since then he has been drinking 1 pint of vodka every day- his last drink was on Oct 31st. Admitted for Hyperbilirubinemia- all w/u so far negative and is being treated for alcoholic hepatitis     Patient was evaluated, reports that he feels well. Denies abd pain or nausea- eating well.     Labs reviewed- cbc, bmp, LFTs     PE as above     A/P:  #Suspected alcoholic hepatitis (AH) superimposed to cirrhosis (MetALD)  #Liver cirrhosis   #HTN  #ETOH dependence   #Hepatomegaly   #DVT ppx    Plan:  -Pt w/ hyperbilirubinemia, MRCP did not show obstruction. No portal  or hepatic vein thrombosis reported  -Appreciate hepatology recs, c/w prednisolone. Calculate Lille score at day 7. patient was offered transfer to [facility] and was accepted by Dr. Chapin but patient refused.   -Completed NAC   -Infectious w/u so far negative. Follow   -No concern of group home given baseline mental status  -Given cirrhosis, patient would also need EGD for EV screening.  -BP stable, cont amlodipine  for now.   -C/w Lovenox SC .
Mr. Ramirez is a 65 y/o male from home w/ PMH of HTN and ETOH use presents to the hospital w/ 3 days of jaundice. He denies any abdominal pain, fever, chills, weight loss,  ongoing nausea or vomiting.  Patient reports that he retired in May'24 and since then he has been drinking 1 pint of vodka every day- his last drink was on Oct 31st. Admitted for Hyperbilirubinemia- all w/u so far negative and is being treated for alcoholic hepatitis     Patient was evaluated, no new complaints.     Labs reviewed- cbc, bmp, LFTs     PE as above     A/P:  #Suspected alcoholic hepatitis (AH) superimposed to cirrhosis (MetALD)  #Liver cirrhosis   #HTN  #ETOH dependence   #Hepatomegaly   #DVT ppx    Plan:  -Pt w/ hyperbilirubinemia, MRCP did not show obstruction. No portal  or hepatic vein thrombosis reported  -Appreciate hepatology recs, c/w prednisolone. T bili improved to 11.4 today. Calculate Lille score at day 7. Patient was offered transfer to [facility] earlier in course and was accepted by Dr. Chapin but patient refused.   -Completed NAC   -Infectious w/u so far negative.  -No concern of longterm given baseline mental status  -Given cirrhosis, patient would also need EGD for EV screening.  -BP stable, cont amlodipine  for now.   -C/w Lovenox SC .

## 2024-11-15 NOTE — PROGRESS NOTE ADULT - PROBLEM SELECTOR PROBLEM 3
Cirrhosis
Esophageal varices in cirrhosis
Esophageal varices in cirrhosis
Acute UTI
Esophageal varices in cirrhosis
Esophageal varices in cirrhosis
Cirrhosis
Esophageal varices in cirrhosis
Acute UTI

## 2024-11-15 NOTE — PROGRESS NOTE ADULT - PROBLEM SELECTOR PROBLEM 5
Alcohol use disorder
Gall stones
Alcohol use disorder
Gall stones
Gall stones
Alcohol use disorder
Gall stones

## 2024-11-15 NOTE — PROGRESS NOTE ADULT - PROBLEM SELECTOR PLAN 4
Drinks 1 pint vodka since 5/2024 (jail), reports social drinking prior to jail  Last drink on 10/29  No s/s etoh withdrawal  Monitor for [facility]  Social work consulted Drinks 1 pint vodka since 5/2024 (care home), reports social drinking prior to care home  -Last drink on 10/29  -No s/s ETOH withdrawal  -S/p CIWA monitoring  -Social work following

## 2024-11-15 NOTE — PROGRESS NOTE ADULT - PROBLEM SELECTOR PLAN 6
Cr 1.39 on admssion  TODD  versus CKD  Follow up urine studies    RESOLVED Cr 1.39 on admssion  - TODD vs CKD  Follow up urine studies  RESOLVED Cr 1.39 on admission  -TODD vs CKD  -Follow up urine studies  RESOLVED

## 2024-11-15 NOTE — PROGRESS NOTE ADULT - PROBLEM SELECTOR PLAN 2
ISO alcoholic cirrhosis-Not improving  -TBili  12.5[H]  /  DBili  x   /  AST  145[H]  /  ALT  77[H]  /  AlkPhos  130[H]  11-11 -11/14-->TPro  7.1  /  Alb  2.2[L]  /  TBili  11.7[H]  /  DBili  x   /  AST  153[H]  /  ALT  96[H]  /  AlkPhos  139[H]  11-14  -Avoid hepatotoxic meds  -PT/INR - PT: 17.7 sec;   INR: 1.52 ratio  -Trend CMP daily ISO alcoholic cirrhosis-Not improving  -TBili  12.5/DBili x/ / ALT 77/AlkPhos 130   AST/ALT  212/112 (11/15)  -PT/INR - PT: 17.7 sec;   INR: 1.52 ratio  -Avoid hepatotoxic agents  -Trend CMP daily

## 2024-11-16 LAB
ALBUMIN SERPL ELPH-MCNC: 2.3 G/DL — LOW (ref 3.5–5)
ALP SERPL-CCNC: 149 U/L — HIGH (ref 40–120)
ALT FLD-CCNC: 100 U/L DA — HIGH (ref 10–60)
ANION GAP SERPL CALC-SCNC: 8 MMOL/L — SIGNIFICANT CHANGE UP (ref 5–17)
AST SERPL-CCNC: 123 U/L — HIGH (ref 10–40)
BILIRUB SERPL-MCNC: 13.1 MG/DL — HIGH (ref 0.2–1.2)
BUN SERPL-MCNC: 25 MG/DL — HIGH (ref 7–18)
CALCIUM SERPL-MCNC: 9.6 MG/DL — SIGNIFICANT CHANGE UP (ref 8.4–10.5)
CHLORIDE SERPL-SCNC: 102 MMOL/L — SIGNIFICANT CHANGE UP (ref 96–108)
CO2 SERPL-SCNC: 23 MMOL/L — SIGNIFICANT CHANGE UP (ref 22–31)
CREAT SERPL-MCNC: 1.14 MG/DL — SIGNIFICANT CHANGE UP (ref 0.5–1.3)
EGFR: 73 ML/MIN/1.73M2 — SIGNIFICANT CHANGE UP
GLUCOSE BLDC GLUCOMTR-MCNC: 212 MG/DL — HIGH (ref 70–99)
GLUCOSE BLDC GLUCOMTR-MCNC: 258 MG/DL — HIGH (ref 70–99)
GLUCOSE BLDC GLUCOMTR-MCNC: 358 MG/DL — HIGH (ref 70–99)
GLUCOSE BLDC GLUCOMTR-MCNC: 375 MG/DL — HIGH (ref 70–99)
GLUCOSE SERPL-MCNC: 229 MG/DL — HIGH (ref 70–99)
HCT VFR BLD CALC: 29.4 % — LOW (ref 39–50)
HGB BLD-MCNC: 10.1 G/DL — LOW (ref 13–17)
MCHC RBC-ENTMCNC: 33.1 PG — SIGNIFICANT CHANGE UP (ref 27–34)
MCHC RBC-ENTMCNC: 34.4 G/DL — SIGNIFICANT CHANGE UP (ref 32–36)
MCV RBC AUTO: 96.4 FL — SIGNIFICANT CHANGE UP (ref 80–100)
NRBC # BLD: 0 /100 WBCS — SIGNIFICANT CHANGE UP (ref 0–0)
PLATELET # BLD AUTO: 324 K/UL — SIGNIFICANT CHANGE UP (ref 150–400)
POTASSIUM SERPL-MCNC: 4.7 MMOL/L — SIGNIFICANT CHANGE UP (ref 3.5–5.3)
POTASSIUM SERPL-SCNC: 4.7 MMOL/L — SIGNIFICANT CHANGE UP (ref 3.5–5.3)
PROT SERPL-MCNC: 7.8 G/DL — SIGNIFICANT CHANGE UP (ref 6–8.3)
RBC # BLD: 3.05 M/UL — LOW (ref 4.2–5.8)
RBC # FLD: 17.2 % — HIGH (ref 10.3–14.5)
SODIUM SERPL-SCNC: 133 MMOL/L — LOW (ref 135–145)
WBC # BLD: 15.14 K/UL — HIGH (ref 3.8–10.5)
WBC # FLD AUTO: 15.14 K/UL — HIGH (ref 3.8–10.5)

## 2024-11-16 PROCEDURE — 99232 SBSQ HOSP IP/OBS MODERATE 35: CPT

## 2024-11-16 RX ADMIN — Medication 4 UNIT(S): at 08:05

## 2024-11-16 RX ADMIN — Medication 30 MILLIGRAM(S): at 05:36

## 2024-11-16 RX ADMIN — AMLODIPINE BESYLATE 10 MILLIGRAM(S): 10 TABLET ORAL at 05:36

## 2024-11-16 RX ADMIN — Medication 1 TABLET(S): at 11:33

## 2024-11-16 RX ADMIN — Medication 4 UNIT(S): at 16:55

## 2024-11-16 RX ADMIN — Medication 4 UNIT(S): at 11:34

## 2024-11-16 RX ADMIN — Medication 2: at 08:05

## 2024-11-16 RX ADMIN — PANTOPRAZOLE SODIUM 40 MILLIGRAM(S): 40 TABLET, DELAYED RELEASE ORAL at 05:36

## 2024-11-16 RX ADMIN — INSULIN GLARGINE 10 UNIT(S): 100 INJECTION, SOLUTION SUBCUTANEOUS at 21:26

## 2024-11-16 RX ADMIN — Medication 1: at 21:26

## 2024-11-16 RX ADMIN — Medication 5: at 11:34

## 2024-11-16 RX ADMIN — Medication 100 MILLIGRAM(S): at 11:34

## 2024-11-16 RX ADMIN — ENOXAPARIN SODIUM 40 MILLIGRAM(S): 30 INJECTION SUBCUTANEOUS at 18:15

## 2024-11-16 RX ADMIN — Medication 5: at 16:55

## 2024-11-16 RX ADMIN — Medication 1 MILLIGRAM(S): at 11:34

## 2024-11-16 NOTE — PROGRESS NOTE ADULT - LYMPHATIC
No lymphadedenopathy
Statement Selected

## 2024-11-16 NOTE — PROGRESS NOTE ADULT - ASSESSMENT
Mr. Ramirez is a 65 y/o male from home w/ PMH of HTN and ETOH use presents to the hospital w/ 3 days of jaundice. He denies any abdominal pain, fever, chills, weight loss,  ongoing nausea or vomiting.  Patient reports that he retired in May'24 and since then he has been drinking 1 pint of vodka every day- his last drink was on Oct 31st. Admitted for Hyperbilirubinemia- all w/u so far negative and is being treated for alcoholic hepatitis       A/P:  #Suspected alcoholic hepatitis (AH) superimposed to cirrhosis (MetALD)  #Liver cirrhosis   #HTN  #ETOH dependence   #Hepatomegaly   #DVT ppx    Plan:  -Pt w/ hyperbilirubinemia, MRCP did not show obstruction. No portal  or hepatic vein thrombosis reported  -Appreciate hepatology recsJamaal score at day 7- 0.59- suggestive of poor prognosis. D/w patient regarding transfer to [facility] for a possible liver transplant evaluation but patient adamantly refused. He wants to exhaust all options before going that route.   -T.bili 13.1 today   -C/w prednisolone   -Completed NAC   -Infectious w/u so far negative.  -No concern of KEREN given baseline mental status  -Given cirrhosis, patient would also need EGD for EV screening.  -BP stable, cont amlodipine  for now.   -C/w Lovenox SC .

## 2024-11-16 NOTE — PROGRESS NOTE ADULT - SUBJECTIVE AND OBJECTIVE BOX
[   ] ICU                                          [   ] CCU                                      [ X  ] Medical Floor    Patient is a 64 year old male with jaundice. GI consulted to evaluate.         64 year old male with past medical history significant for HTN, DM, ETOH abuse, Hyperlipidemia, presented to the emergency room with 3 days history of jaundice associated with non bloody vomiting and dark color urine. Patient reported drinking ETOH heavily over past 6 months (1 pint vodka daily). Patient denies abdominal pain, hematemesis, hematochezia, melena, fever, chills, chest pain, SOB, cough, hematuria, dysuria or diarrhea.     Patient appears comfortable. No new complaints reported, No abdominal pain, N/V, hematemesis, hematochezia, melena, fever, chills, chest pain, SOB, cough or diarrhea reported.      PAIN MANAGEMENT:  Pain Scale:                0 /10  Pain Location:         PAST MEDICAL HISTORY    HTN (hypertension)    Hyperlipidemia     Diabetes mellitus    ETOH abuse        PAST SURGICAL HISTORY    No significant surgical history reported        Allergies    No Known Allergies    Intolerances  None         SOCIAL HISTORY  Advanced Directives:       [ X ] Full Code       [  ] DNR  Marital Status:         [  ] M      [ X ] S      [  ] D       [  ] W  Children:       [ X ] Yes      [  ] No  Occupation:        [  ] Employed       [ X ] Unemployed       [  ] Retired  Diet:       [ X ] Regular       [  ] PEG feeding          [  ] NG tube feeding  Drug Use:           [ X ] Patient denied          [  ] Yes  Alcohol:           [  ] No             [  ] Yes (socially)         [ X ] Yes (chronic)  Tobacco:           [  ] Yes           [ X ] No      FAMILY HISTORY  [ X ] Heart Disease            [ X ] Diabetes             [ X ] HTN             [  ] Colon Cancer             [  ] Stomach Cancer              [  ] Pancreatic Cancer    VITALS   T(C): 36.5   T(F):  97.7  HR:  91  BP:  137/71   RR: 18   SpO2: 98%      MEDICATIONS  (STANDING):  amLODIPine   Tablet 10 milliGRAM(s) Oral daily  enoxaparin Injectable 40 milliGRAM(s) SubCutaneous every 24 hours  folic acid 1 milliGRAM(s) Oral daily  influenza   Vaccine 0.5 milliLiter(s) IntraMuscular once  insulin glargine Injectable (LANTUS) 10 Unit(s) SubCutaneous at bedtime  insulin lispro (ADMELOG) corrective regimen sliding scale   SubCutaneous three times a day before meals  insulin lispro (ADMELOG) corrective regimen sliding scale   SubCutaneous at bedtime  insulin lispro Injectable (ADMELOG) 4 Unit(s) SubCutaneous three times a day before meals  multivitamin 1 Tablet(s) Oral daily  pantoprazole    Tablet 40 milliGRAM(s) Oral before breakfast  prednisoLONE    3 mG/mL Solution (ORAPRED) 30 milliGRAM(s) Oral daily  thiamine 100 milliGRAM(s) Oral daily    MEDICATIONS  (PRN):  aluminum hydroxide/magnesium hydroxide/simethicone Suspension 30 milliLiter(s) Oral every 6 hours PRN Dyspepsia                            9.5    12.54 )-----------( 265      ( 17 Nov 2024 06:23 )             26.8       11-17    133[L]  |  102  |  24[H]  ----------------------------<  169[H]  4.7   |  23  |  1.09    Ca    9.2      17 Nov 2024 06:23    TPro  7.2  /  Alb  2.1[L]  /  TBili  11.0[H]  /  DBili  x   /  AST  135[H]  /  ALT  95[H]  /  AlkPhos  131[H]  11-17

## 2024-11-16 NOTE — PROGRESS NOTE ADULT - SUBJECTIVE AND OBJECTIVE BOX
Patient is a 64y old  Male who presents with a chief complaint of painless jaundice (15 Nov 2024 14:32)      INTERVAL HPI/OVERNIGHT EVENTS: no events noted overnight.    MEDICATIONS  (STANDING):  amLODIPine   Tablet 10 milliGRAM(s) Oral daily  enoxaparin Injectable 40 milliGRAM(s) SubCutaneous every 24 hours  folic acid 1 milliGRAM(s) Oral daily  influenza   Vaccine 0.5 milliLiter(s) IntraMuscular once  insulin glargine Injectable (LANTUS) 10 Unit(s) SubCutaneous at bedtime  insulin lispro (ADMELOG) corrective regimen sliding scale   SubCutaneous three times a day before meals  insulin lispro (ADMELOG) corrective regimen sliding scale   SubCutaneous at bedtime  insulin lispro Injectable (ADMELOG) 4 Unit(s) SubCutaneous three times a day before meals  multivitamin 1 Tablet(s) Oral daily  pantoprazole    Tablet 40 milliGRAM(s) Oral before breakfast  prednisoLONE    3 mG/mL Solution (ORAPRED) 30 milliGRAM(s) Oral daily  thiamine 100 milliGRAM(s) Oral daily    MEDICATIONS  (PRN):  aluminum hydroxide/magnesium hydroxide/simethicone Suspension 30 milliLiter(s) Oral every 6 hours PRN Dyspepsia      __________________________________________________  REVIEW OF SYSTEMS:    CONSTITUTIONAL: No fever,   EYES: no acute visual disturbances  NECK: No pain or stiffness  RESPIRATORY: No cough; No shortness of breath  CARDIOVASCULAR: No chest pain, no palpitations  GASTROINTESTINAL: No pain. No nausea or vomiting; No diarrhea   NEUROLOGICAL: No headache or numbness, no tremors  MUSCULOSKELETAL: No joint pain, no muscle pain  GENITOURINARY: no dysuria, no frequency, no hesitancy  PSYCHIATRY: no depression , no anxiety  ALL OTHER  ROS negative        Vital Signs Last 24 Hrs  T(C): 36.5 (16 Nov 2024 05:11), Max: 37 (15 Nov 2024 20:45)  T(F): 97.7 (16 Nov 2024 05:11), Max: 98.6 (15 Nov 2024 20:45)  HR: 91 (16 Nov 2024 05:11) (89 - 98)  BP: 137/71 (16 Nov 2024 05:11) (122/66 - 137/71)  BP(mean): 85 (15 Nov 2024 20:45) (85 - 85)  RR: 18 (16 Nov 2024 05:11) (16 - 18)  SpO2: 98% (16 Nov 2024 05:11) (97% - 98%)    Parameters below as of 16 Nov 2024 05:11  Patient On (Oxygen Delivery Method): room air        ________________________________________________  PHYSICAL EXAM:  GENERAL: NAD  HEENT: Normocephalic;  conjunctivae and scleral icterus,  moist mucous membranes;   NECK : supple  CHEST/LUNG: Clear to auscultation bilaterally with good air entry   HEART: S1 S2  regular; no murmurs, gallops or rubs  ABDOMEN: Soft, Nontender, Nondistended; Bowel sounds present  EXTREMITIES: no cyanosis; no edema; no calf tenderness  SKIN: warm and dry; yellowish tinge   NERVOUS SYSTEM:  Awake and alert; Oriented  to place, person and time ; no new deficits    _________________________________________________  LABS:                        10.1   15.14 )-----------( 324      ( 16 Nov 2024 06:33 )             29.4     11-16    133[L]  |  102  |  25[H]  ----------------------------<  229[H]  4.7   |  23  |  1.14    Ca    9.6      16 Nov 2024 06:33    TPro  7.8  /  Alb  2.3[L]  /  TBili  13.1[H]  /  DBili  x   /  AST  123[H]  /  ALT  100[H]  /  AlkPhos  149[H]  11-16    PT/INR - ( 15 Nov 2024 06:30 )   PT: 16.8 sec;   INR: 1.45 ratio         PTT - ( 15 Nov 2024 06:30 )  PTT:31.9 sec  Urinalysis Basic - ( 16 Nov 2024 06:33 )    Color: x / Appearance: x / SG: x / pH: x  Gluc: 229 mg/dL / Ketone: x  / Bili: x / Urobili: x   Blood: x / Protein: x / Nitrite: x   Leuk Esterase: x / RBC: x / WBC x   Sq Epi: x / Non Sq Epi: x / Bacteria: x      CAPILLARY BLOOD GLUCOSE      POCT Blood Glucose.: 375 mg/dL (16 Nov 2024 11:25)  POCT Blood Glucose.: 212 mg/dL (16 Nov 2024 07:54)  POCT Blood Glucose.: 291 mg/dL (15 Nov 2024 21:31)  POCT Blood Glucose.: 380 mg/dL (15 Nov 2024 16:33)        RADIOLOGY & ADDITIONAL TESTS:    Imaging Personally Reviewed:  YES    Consultant(s) Notes Reviewed:   YES    Care Discussed with Consultants : YES     Plan of care was discussed with patient and /or primary care giver; all questions and concerns were addressed and care was aligned with patient's wishes.

## 2024-11-16 NOTE — PROGRESS NOTE ADULT - ASSESSMENT
1. Painless jaundice  2. Transaminitis  3. Alcoholic cirrhosis  4. Portal HTN  5. Paraesophageal/perigastric varices  6. Cholelithiasis with gallbladder wall edema and pericholecystic fluid  7. Cholecystitis less likely  8. Ascites  9. ETOH abuse  10. Anemia  11. No evidence of acute GI bleeding  12. S/p MRCP  13. No extrahepatic biliary obstruction    Suggestions:    1. Follow up LFT's  2. Hepatology follow up   3. Protonix 40mg daily8.    4. Monitor H/H  5. Transfuse PRBC as needed   6. Therapeutic paracentesis as needed  7. Patient does not consider liver transplant  8. Diet as tolerated  9. DVT prophylaxis

## 2024-11-17 LAB
ALBUMIN SERPL ELPH-MCNC: 2.1 G/DL — LOW (ref 3.5–5)
ALP SERPL-CCNC: 131 U/L — HIGH (ref 40–120)
ALT FLD-CCNC: 95 U/L DA — HIGH (ref 10–60)
ANION GAP SERPL CALC-SCNC: 8 MMOL/L — SIGNIFICANT CHANGE UP (ref 5–17)
AST SERPL-CCNC: 135 U/L — HIGH (ref 10–40)
BILIRUB SERPL-MCNC: 11 MG/DL — HIGH (ref 0.2–1.2)
BUN SERPL-MCNC: 24 MG/DL — HIGH (ref 7–18)
CALCIUM SERPL-MCNC: 9.2 MG/DL — SIGNIFICANT CHANGE UP (ref 8.4–10.5)
CHLORIDE SERPL-SCNC: 102 MMOL/L — SIGNIFICANT CHANGE UP (ref 96–108)
CO2 SERPL-SCNC: 23 MMOL/L — SIGNIFICANT CHANGE UP (ref 22–31)
CREAT SERPL-MCNC: 1.09 MG/DL — SIGNIFICANT CHANGE UP (ref 0.5–1.3)
EGFR: 77 ML/MIN/1.73M2 — SIGNIFICANT CHANGE UP
GLUCOSE BLDC GLUCOMTR-MCNC: 203 MG/DL — HIGH (ref 70–99)
GLUCOSE BLDC GLUCOMTR-MCNC: 279 MG/DL — HIGH (ref 70–99)
GLUCOSE BLDC GLUCOMTR-MCNC: 319 MG/DL — HIGH (ref 70–99)
GLUCOSE BLDC GLUCOMTR-MCNC: 391 MG/DL — HIGH (ref 70–99)
GLUCOSE SERPL-MCNC: 169 MG/DL — HIGH (ref 70–99)
HCT VFR BLD CALC: 26.8 % — LOW (ref 39–50)
HGB BLD-MCNC: 9.5 G/DL — LOW (ref 13–17)
MCHC RBC-ENTMCNC: 34.3 PG — HIGH (ref 27–34)
MCHC RBC-ENTMCNC: 35.4 G/DL — SIGNIFICANT CHANGE UP (ref 32–36)
MCV RBC AUTO: 96.8 FL — SIGNIFICANT CHANGE UP (ref 80–100)
NRBC # BLD: 0 /100 WBCS — SIGNIFICANT CHANGE UP (ref 0–0)
PLATELET # BLD AUTO: 265 K/UL — SIGNIFICANT CHANGE UP (ref 150–400)
POTASSIUM SERPL-MCNC: 4.7 MMOL/L — SIGNIFICANT CHANGE UP (ref 3.5–5.3)
POTASSIUM SERPL-SCNC: 4.7 MMOL/L — SIGNIFICANT CHANGE UP (ref 3.5–5.3)
PROT SERPL-MCNC: 7.2 G/DL — SIGNIFICANT CHANGE UP (ref 6–8.3)
RBC # BLD: 2.77 M/UL — LOW (ref 4.2–5.8)
RBC # FLD: 16.9 % — HIGH (ref 10.3–14.5)
SODIUM SERPL-SCNC: 133 MMOL/L — LOW (ref 135–145)
WBC # BLD: 12.54 K/UL — HIGH (ref 3.8–10.5)
WBC # FLD AUTO: 12.54 K/UL — HIGH (ref 3.8–10.5)

## 2024-11-17 PROCEDURE — 99232 SBSQ HOSP IP/OBS MODERATE 35: CPT

## 2024-11-17 RX ADMIN — Medication 2: at 21:33

## 2024-11-17 RX ADMIN — Medication 4 UNIT(S): at 11:30

## 2024-11-17 RX ADMIN — PANTOPRAZOLE SODIUM 40 MILLIGRAM(S): 40 TABLET, DELAYED RELEASE ORAL at 05:13

## 2024-11-17 RX ADMIN — Medication 30 MILLIGRAM(S): at 05:13

## 2024-11-17 RX ADMIN — Medication 2: at 07:53

## 2024-11-17 RX ADMIN — Medication 4 UNIT(S): at 07:54

## 2024-11-17 RX ADMIN — Medication 4: at 11:30

## 2024-11-17 RX ADMIN — Medication 100 MILLIGRAM(S): at 11:30

## 2024-11-17 RX ADMIN — ENOXAPARIN SODIUM 40 MILLIGRAM(S): 30 INJECTION SUBCUTANEOUS at 21:33

## 2024-11-17 RX ADMIN — INSULIN GLARGINE 10 UNIT(S): 100 INJECTION, SOLUTION SUBCUTANEOUS at 21:32

## 2024-11-17 RX ADMIN — Medication 1 MILLIGRAM(S): at 11:31

## 2024-11-17 RX ADMIN — Medication 1 TABLET(S): at 11:30

## 2024-11-17 RX ADMIN — Medication 5: at 16:41

## 2024-11-17 RX ADMIN — Medication 4 UNIT(S): at 16:41

## 2024-11-17 RX ADMIN — AMLODIPINE BESYLATE 10 MILLIGRAM(S): 10 TABLET ORAL at 05:13

## 2024-11-17 NOTE — PROGRESS NOTE ADULT - PROVIDER SPECIALTY LIST ADULT
Gastroenterology
Hepatology
Gastroenterology
Hepatology
Internal Medicine
Gastroenterology
Gastroenterology
Hepatology
Internal Medicine
Gastroenterology
Gastroenterology
Internal Medicine
Gastroenterology
Internal Medicine
Gastroenterology
Internal Medicine
Internal Medicine
Gastroenterology
Gastroenterology

## 2024-11-17 NOTE — PROGRESS NOTE ADULT - NEGATIVE CARDIOVASCULAR SYMPTOMS
no chest pain/no palpitations/no orthopnea

## 2024-11-17 NOTE — PROGRESS NOTE ADULT - NEGATIVE OPHTHALMOLOGIC SYMPTOMS
no diplopia/no photophobia/no lacrimation L/no lacrimation R/no blurred vision L/no blurred vision R/no discharge L/no discharge R/no pain L/no pain R/no irritation L/no irritation R/no scleral injection L/no scleral injection R

## 2024-11-17 NOTE — PROGRESS NOTE ADULT - RESPIRATORY
clear to auscultation bilaterally/no wheezes/no rales/no rhonchi/no respiratory distress/no use of accessory muscles/airway patent/breath sounds equal/good air movement

## 2024-11-17 NOTE — PROGRESS NOTE ADULT - NECK
supple/symmetric/no tracheal deviation
supple/symmetric/no tracheal deviation
supple/symmetric
supple/symmetric
supple/symmetric/no tracheal deviation
supple/symmetric/no tracheal deviation
supple/symmetric
supple/symmetric/no tracheal deviation
supple/symmetric

## 2024-11-17 NOTE — PROGRESS NOTE ADULT - GEN GEN HX ROS MEA POS PC
anorexia

## 2024-11-17 NOTE — PROGRESS NOTE ADULT - SUBJECTIVE AND OBJECTIVE BOX
[   ] ICU                                          [   ] CCU                                      [ X  ] Medical Floor    Patient is a 64 year old male with jaundice. GI consulted to evaluate.         64 year old male with past medical history significant for HTN, DM, ETOH abuse, Hyperlipidemia, presented to the emergency room with 3 days history of jaundice associated with non bloody vomiting and dark color urine. Patient reported drinking ETOH heavily over past 6 months (1 pint vodka daily). Patient denies abdominal pain, hematemesis, hematochezia, melena, fever, chills, chest pain, SOB, cough, hematuria, dysuria or diarrhea.     Patient appears comfortable. No new complaints reported, No abdominal pain, N/V, hematemesis, hematochezia, melena, fever, chills, chest pain, SOB, cough or diarrhea reported.      PAIN MANAGEMENT:  Pain Scale:                0 /10  Pain Location:         PAST MEDICAL HISTORY    HTN (hypertension)    Hyperlipidemia     Diabetes mellitus    ETOH abuse        PAST SURGICAL HISTORY    No significant surgical history reported        Allergies    No Known Allergies    Intolerances  None         SOCIAL HISTORY  Advanced Directives:       [ X ] Full Code       [  ] DNR  Marital Status:         [  ] M      [ X ] S      [  ] D       [  ] W  Children:       [ X ] Yes      [  ] No  Occupation:        [  ] Employed       [ X ] Unemployed       [  ] Retired  Diet:       [ X ] Regular       [  ] PEG feeding          [  ] NG tube feeding  Drug Use:           [ X ] Patient denied          [  ] Yes  Alcohol:           [  ] No             [  ] Yes (socially)         [ X ] Yes (chronic)  Tobacco:           [  ] Yes           [ X ] No      FAMILY HISTORY  [ X ] Heart Disease            [ X ] Diabetes             [ X ] HTN             [  ] Colon Cancer             [  ] Stomach Cancer              [  ] Pancreatic Cancer        VITALS   Vital Signs Last 24 Hrs  T(C): 36.7 (11-17-24 @ 05:00), Max: 36.7 (11-16-24 @ 13:10)  T(F): 98 (11-17-24 @ 05:00), Max: 98.1 (11-16-24 @ 13:10)  HR: 81 (11-17-24 @ 05:00) (81 - 90)  BP: 130/72 (11-17-24 @ 05:00) (130/72 - 143/72)  BP(mean): 91 (11-17-24 @ 05:00) (91 - 91)  RR: 18 (11-17-24 @ 05:00) (18 - 18)  SpO2: 99% (11-17-24 @ 05:00) (96% - 99%)        MEDICATIONS  (STANDING):  amLODIPine   Tablet 10 milliGRAM(s) Oral daily  enoxaparin Injectable 40 milliGRAM(s) SubCutaneous every 24 hours  folic acid 1 milliGRAM(s) Oral daily  influenza   Vaccine 0.5 milliLiter(s) IntraMuscular once  insulin glargine Injectable (LANTUS) 10 Unit(s) SubCutaneous at bedtime  insulin lispro (ADMELOG) corrective regimen sliding scale   SubCutaneous three times a day before meals  insulin lispro (ADMELOG) corrective regimen sliding scale   SubCutaneous at bedtime  insulin lispro Injectable (ADMELOG) 4 Unit(s) SubCutaneous three times a day before meals  multivitamin 1 Tablet(s) Oral daily  pantoprazole    Tablet 40 milliGRAM(s) Oral before breakfast  prednisoLONE    3 mG/mL Solution (ORAPRED) 30 milliGRAM(s) Oral daily  thiamine 100 milliGRAM(s) Oral daily    MEDICATIONS  (PRN):  aluminum hydroxide/magnesium hydroxide/simethicone Suspension 30 milliLiter(s) Oral every 6 hours PRN Dyspepsia                            9.5    12.54 )-----------( 265      ( 17 Nov 2024 06:23 )             26.8       11-17    133[L]  |  102  |  24[H]  ----------------------------<  169[H]  4.7   |  23  |  1.09    Ca    9.2      17 Nov 2024 06:23    TPro  7.2  /  Alb  2.1[L]  /  TBili  11.0[H]  /  DBili  x   /  AST  135[H]  /  ALT  95[H]  /  AlkPhos  131[H]  11-17

## 2024-11-17 NOTE — PROGRESS NOTE ADULT - EYES
PERRL/EOMI/conjunctiva clear/Icteric sclera
PERRL/EOMI/conjunctiva clear/non icteric sclera
PERRL/EOMI/conjunctiva clear/Icteric sclera
PERRL/EOMI/conjunctiva clear/non icteric sclera
PERRL/EOMI/conjunctiva clear/non icteric sclera
PERRL/EOMI/conjunctiva clear/Icteric sclera
PERRL/EOMI/conjunctiva clear/non icteric sclera

## 2024-11-17 NOTE — PROGRESS NOTE ADULT - GIT GEN HX ROS MEA POS PC
yes
vomiting/jaundice

## 2024-11-17 NOTE — PROGRESS NOTE ADULT - SKIN
warm and dry/jaundiced/no rashes
warm and dry/no rashes
warm and dry/jaundiced/no rashes
warm and dry/no rashes
warm and dry/no rashes
warm and dry/jaundiced/no rashes
warm and dry/no rashes
warm and dry/jaundiced/no rashes
warm and dry/jaundiced/no rashes
warm and dry/no rashes
warm and dry/no rashes/no ulcers

## 2024-11-17 NOTE — PROGRESS NOTE ADULT - NEGATIVE GENERAL GENITOURINARY SYMPTOMS
no hematuria/no renal colic/no flank pain L/no flank pain R/no incontinence/no dysuria

## 2024-11-17 NOTE — PROGRESS NOTE ADULT - PSY GEN HX ROS MEA POS PC
depression

## 2024-11-17 NOTE — PROGRESS NOTE ADULT - TONSILS
no redness/no swelling
no redness/no swelling/no discharge
no redness/no swelling

## 2024-11-17 NOTE — PROGRESS NOTE ADULT - NOSE
no discharge/no deviation
no discharge
no discharge/no deviation

## 2024-11-17 NOTE — PROGRESS NOTE ADULT - NEGATIVE MUSCULOSKELETAL SYMPTOMS
no muscle cramps/no stiffness/no neck pain/no arm pain L/no arm pain R/no leg pain L/no leg pain R

## 2024-11-17 NOTE — PROGRESS NOTE ADULT - NEGATIVE NEUROLOGICAL SYMPTOMS
no syncope/no tremors/no vertigo/no loss of sensation/no headache

## 2024-11-17 NOTE — PROGRESS NOTE ADULT - ASSESSMENT
Mr. Ramirez is a 63 y/o male from home w/ PMH of HTN and ETOH use presents to the hospital w/ 3 days of jaundice. He denies any abdominal pain, fever, chills, weight loss,  ongoing nausea or vomiting.  Patient reports that he retired in May'24 and since then he has been drinking 1 pint of vodka every day- his last drink was on Oct 31st. Admitted for Hyperbilirubinemia- all w/u so far negative and is being treated for alcoholic hepatitis       A/P:  #Suspected alcoholic hepatitis (AH) superimposed to cirrhosis (MetALD)  #Liver cirrhosis   #HTN  #ETOH dependence   #Hepatomegaly   #DVT ppx    Plan:  -Pt w/ hyperbilirubinemia, MRCP did not show obstruction. No portal  or hepatic vein thrombosis reported  -Appreciate hepatology recsJamaal score at day 7- 0.59- suggestive of poor prognosis. D/w patient regarding transfer to [facility] for a possible liver transplant evaluation but patient adamantly refused. He wants to exhaust all options before going that route.   -T.bili 11 today   -C/w prednisolone   -Completed NAC   -Infectious w/u so far negative.  -No concern of KEREN given baseline mental status  -Given cirrhosis, patient would also need EGD for EV screening.  -BP stable, cont amlodipine  for now.   -C/w Lovenox SC .

## 2024-11-17 NOTE — PROGRESS NOTE ADULT - NEGATIVE HEMATOLOGY SYMPTOMS
no gum bleeding/no nose bleeding/no skin lumps

## 2024-11-17 NOTE — PROGRESS NOTE ADULT - NEGATIVE GENERAL SYMPTOMS
no fever/no chills/no sweating/no polyphagia/no polyuria/no polydipsia

## 2024-11-17 NOTE — PROGRESS NOTE ADULT - NEGATIVE PSYCHIATRIC SYMPTOMS
no suicidal ideation/no anxiety/no insomnia/no memory loss/no paranoia/no mood swings/no agitation

## 2024-11-17 NOTE — PROGRESS NOTE ADULT - NEGATIVE RESPIRATORY AND THORAX SYMPTOMS
no wheezing/no dyspnea/no cough/no hemoptysis/no pleuritic chest pain

## 2024-11-17 NOTE — PROGRESS NOTE ADULT - CONSTITUTIONAL
well-groomed/no distress

## 2024-11-17 NOTE — PROGRESS NOTE ADULT - CARDIOVASCULAR SYMPTOMS
peripheral edema

## 2024-11-17 NOTE — PROGRESS NOTE ADULT - NEGATIVE SKIN SYMPTOMS
no rash/no dryness/no brittle nails/no pitted nails/no hair loss

## 2024-11-18 ENCOUNTER — TRANSCRIPTION ENCOUNTER (OUTPATIENT)
Age: 64
End: 2024-11-18

## 2024-11-18 VITALS
DIASTOLIC BLOOD PRESSURE: 73 MMHG | RESPIRATION RATE: 18 BRPM | SYSTOLIC BLOOD PRESSURE: 118 MMHG | TEMPERATURE: 98 F | HEART RATE: 98 BPM | OXYGEN SATURATION: 98 %

## 2024-11-18 LAB
ALBUMIN SERPL ELPH-MCNC: 2.3 G/DL — LOW (ref 3.5–5)
ALP SERPL-CCNC: 132 U/L — HIGH (ref 40–120)
ALT FLD-CCNC: 116 U/L DA — HIGH (ref 10–60)
ANION GAP SERPL CALC-SCNC: 6 MMOL/L — SIGNIFICANT CHANGE UP (ref 5–17)
AST SERPL-CCNC: 171 U/L — HIGH (ref 10–40)
BILIRUB SERPL-MCNC: 10.6 MG/DL — HIGH (ref 0.2–1.2)
BUN SERPL-MCNC: 28 MG/DL — HIGH (ref 7–18)
CALCIUM SERPL-MCNC: 9.3 MG/DL — SIGNIFICANT CHANGE UP (ref 8.4–10.5)
CHLORIDE SERPL-SCNC: 103 MMOL/L — SIGNIFICANT CHANGE UP (ref 96–108)
CO2 SERPL-SCNC: 25 MMOL/L — SIGNIFICANT CHANGE UP (ref 22–31)
CREAT SERPL-MCNC: 1.16 MG/DL — SIGNIFICANT CHANGE UP (ref 0.5–1.3)
EGFR: 71 ML/MIN/1.73M2 — SIGNIFICANT CHANGE UP
GLUCOSE BLDC GLUCOMTR-MCNC: 205 MG/DL — HIGH (ref 70–99)
GLUCOSE BLDC GLUCOMTR-MCNC: 303 MG/DL — HIGH (ref 70–99)
GLUCOSE BLDC GLUCOMTR-MCNC: 357 MG/DL — HIGH (ref 70–99)
GLUCOSE SERPL-MCNC: 195 MG/DL — HIGH (ref 70–99)
HCT VFR BLD CALC: 27.2 % — LOW (ref 39–50)
HGB BLD-MCNC: 9.3 G/DL — LOW (ref 13–17)
INR BLD: 1.5 RATIO — HIGH (ref 0.85–1.16)
MCHC RBC-ENTMCNC: 33 PG — SIGNIFICANT CHANGE UP (ref 27–34)
MCHC RBC-ENTMCNC: 34.2 G/DL — SIGNIFICANT CHANGE UP (ref 32–36)
MCV RBC AUTO: 96.5 FL — SIGNIFICANT CHANGE UP (ref 80–100)
NRBC # BLD: 0 /100 WBCS — SIGNIFICANT CHANGE UP (ref 0–0)
PLATELET # BLD AUTO: 269 K/UL — SIGNIFICANT CHANGE UP (ref 150–400)
POTASSIUM SERPL-MCNC: 4.8 MMOL/L — SIGNIFICANT CHANGE UP (ref 3.5–5.3)
POTASSIUM SERPL-SCNC: 4.8 MMOL/L — SIGNIFICANT CHANGE UP (ref 3.5–5.3)
PROT SERPL-MCNC: 7.4 G/DL — SIGNIFICANT CHANGE UP (ref 6–8.3)
PROTHROM AB SERPL-ACNC: 17.5 SEC — HIGH (ref 9.9–13.4)
RBC # BLD: 2.82 M/UL — LOW (ref 4.2–5.8)
RBC # FLD: 16.5 % — HIGH (ref 10.3–14.5)
SODIUM SERPL-SCNC: 134 MMOL/L — LOW (ref 135–145)
WBC # BLD: 12.94 K/UL — HIGH (ref 3.8–10.5)
WBC # FLD AUTO: 12.94 K/UL — HIGH (ref 3.8–10.5)

## 2024-11-18 PROCEDURE — 99285 EMERGENCY DEPT VISIT HI MDM: CPT

## 2024-11-18 PROCEDURE — 84133 ASSAY OF URINE POTASSIUM: CPT

## 2024-11-18 PROCEDURE — 80074 ACUTE HEPATITIS PANEL: CPT

## 2024-11-18 PROCEDURE — 82728 ASSAY OF FERRITIN: CPT

## 2024-11-18 PROCEDURE — 36415 COLL VENOUS BLD VENIPUNCTURE: CPT

## 2024-11-18 PROCEDURE — 84540 ASSAY OF URINE/UREA-N: CPT

## 2024-11-18 PROCEDURE — 86720 LEPTOSPIRA ANTIBODY: CPT

## 2024-11-18 PROCEDURE — 86790 VIRUS ANTIBODY NOS: CPT

## 2024-11-18 PROCEDURE — 87521 HEPATITIS C PROBE&RVRS TRNSC: CPT

## 2024-11-18 PROCEDURE — 82962 GLUCOSE BLOOD TEST: CPT

## 2024-11-18 PROCEDURE — 87086 URINE CULTURE/COLONY COUNT: CPT

## 2024-11-18 PROCEDURE — 87798 DETECT AGENT NOS DNA AMP: CPT

## 2024-11-18 PROCEDURE — 83036 HEMOGLOBIN GLYCOSYLATED A1C: CPT

## 2024-11-18 PROCEDURE — 82103 ALPHA-1-ANTITRYPSIN TOTAL: CPT

## 2024-11-18 PROCEDURE — 83690 ASSAY OF LIPASE: CPT

## 2024-11-18 PROCEDURE — 86705 HEP B CORE ANTIBODY IGM: CPT

## 2024-11-18 PROCEDURE — 86682 HELMINTH ANTIBODY: CPT

## 2024-11-18 PROCEDURE — 82105 ALPHA-FETOPROTEIN SERUM: CPT

## 2024-11-18 PROCEDURE — 82784 ASSAY IGA/IGD/IGG/IGM EACH: CPT

## 2024-11-18 PROCEDURE — 85730 THROMBOPLASTIN TIME PARTIAL: CPT

## 2024-11-18 PROCEDURE — 82787 IGG 1 2 3 OR 4 EACH: CPT

## 2024-11-18 PROCEDURE — 82248 BILIRUBIN DIRECT: CPT

## 2024-11-18 PROCEDURE — 83935 ASSAY OF URINE OSMOLALITY: CPT

## 2024-11-18 PROCEDURE — 76705 ECHO EXAM OF ABDOMEN: CPT

## 2024-11-18 PROCEDURE — A9585: CPT

## 2024-11-18 PROCEDURE — 83735 ASSAY OF MAGNESIUM: CPT

## 2024-11-18 PROCEDURE — 82570 ASSAY OF URINE CREATININE: CPT

## 2024-11-18 PROCEDURE — 84100 ASSAY OF PHOSPHORUS: CPT

## 2024-11-18 PROCEDURE — 82390 ASSAY OF CERULOPLASMIN: CPT

## 2024-11-18 PROCEDURE — 99223 1ST HOSP IP/OBS HIGH 75: CPT

## 2024-11-18 PROCEDURE — 86376 MICROSOMAL ANTIBODY EACH: CPT

## 2024-11-18 PROCEDURE — 85025 COMPLETE CBC W/AUTO DIFF WBC: CPT

## 2024-11-18 PROCEDURE — 87529 HSV DNA AMP PROBE: CPT

## 2024-11-18 PROCEDURE — 86480 TB TEST CELL IMMUN MEASURE: CPT

## 2024-11-18 PROCEDURE — 86255 FLUORESCENT ANTIBODY SCREEN: CPT

## 2024-11-18 PROCEDURE — 80053 COMPREHEN METABOLIC PANEL: CPT

## 2024-11-18 PROCEDURE — 84156 ASSAY OF PROTEIN URINE: CPT

## 2024-11-18 PROCEDURE — 93306 TTE W/DOPPLER COMPLETE: CPT

## 2024-11-18 PROCEDURE — 83540 ASSAY OF IRON: CPT

## 2024-11-18 PROCEDURE — 86381 MITOCHONDRIAL ANTIBODY EACH: CPT

## 2024-11-18 PROCEDURE — 85610 PROTHROMBIN TIME: CPT

## 2024-11-18 PROCEDURE — 86038 ANTINUCLEAR ANTIBODIES: CPT

## 2024-11-18 PROCEDURE — 86704 HEP B CORE ANTIBODY TOTAL: CPT

## 2024-11-18 PROCEDURE — 82150 ASSAY OF AMYLASE: CPT

## 2024-11-18 PROCEDURE — 87799 DETECT AGENT NOS DNA QUANT: CPT

## 2024-11-18 PROCEDURE — 85027 COMPLETE CBC AUTOMATED: CPT

## 2024-11-18 PROCEDURE — 74183 MRI ABD W/O CNTR FLWD CNTR: CPT | Mod: MC

## 2024-11-18 PROCEDURE — 84484 ASSAY OF TROPONIN QUANT: CPT

## 2024-11-18 PROCEDURE — 87040 BLOOD CULTURE FOR BACTERIA: CPT

## 2024-11-18 PROCEDURE — 81256 HFE GENE: CPT

## 2024-11-18 PROCEDURE — 86706 HEP B SURFACE ANTIBODY: CPT

## 2024-11-18 PROCEDURE — 84300 ASSAY OF URINE SODIUM: CPT

## 2024-11-18 PROCEDURE — 81001 URINALYSIS AUTO W/SCOPE: CPT

## 2024-11-18 PROCEDURE — 83550 IRON BINDING TEST: CPT

## 2024-11-18 PROCEDURE — 93005 ELECTROCARDIOGRAM TRACING: CPT

## 2024-11-18 PROCEDURE — 71045 X-RAY EXAM CHEST 1 VIEW: CPT

## 2024-11-18 RX ORDER — TIRZEPATIDE 2.5 MG/.5ML
7.5 INJECTION, SOLUTION SUBCUTANEOUS
Refills: 0 | DISCHARGE

## 2024-11-18 RX ORDER — INSULIN GLARGINE 100 [IU]/ML
15 INJECTION, SOLUTION SUBCUTANEOUS
Qty: 3 | Refills: 0
Start: 2024-11-18 | End: 2024-12-17

## 2024-11-18 RX ORDER — VALSARTAN 320 MG/1
1 TABLET ORAL
Refills: 0 | DISCHARGE

## 2024-11-18 RX ADMIN — Medication 4 UNIT(S): at 07:57

## 2024-11-18 RX ADMIN — Medication 1 TABLET(S): at 11:52

## 2024-11-18 RX ADMIN — Medication 4 UNIT(S): at 11:49

## 2024-11-18 RX ADMIN — Medication 8: at 16:48

## 2024-11-18 RX ADMIN — Medication 10: at 11:50

## 2024-11-18 RX ADMIN — Medication 4: at 07:57

## 2024-11-18 RX ADMIN — PANTOPRAZOLE SODIUM 40 MILLIGRAM(S): 40 TABLET, DELAYED RELEASE ORAL at 05:17

## 2024-11-18 RX ADMIN — AMLODIPINE BESYLATE 10 MILLIGRAM(S): 10 TABLET ORAL at 05:17

## 2024-11-18 RX ADMIN — Medication 100 MILLIGRAM(S): at 11:52

## 2024-11-18 RX ADMIN — Medication 1 MILLIGRAM(S): at 11:50

## 2024-11-18 RX ADMIN — Medication 30 MILLIGRAM(S): at 05:18

## 2024-11-18 RX ADMIN — Medication 4 UNIT(S): at 16:47

## 2024-11-18 NOTE — DISCHARGE NOTE NURSING/CASE MANAGEMENT/SOCIAL WORK - PATIENT PORTAL LINK FT
You can access the FollowMyHealth Patient Portal offered by [facility] by registering at the following website: [email or web address]. By joining [facility]’s FollowMyHealth portal, you will also be able to view your health information using other applications (apps) compatible with our system.

## 2024-11-18 NOTE — DISCHARGE NOTE NURSING/CASE MANAGEMENT/SOCIAL WORK - FINANCIAL ASSISTANCE
[facility] provides services at a reduced cost to those who are determined to be eligible through [facility]’s financial assistance program. Information regarding [facility]’s financial assistance program can be found by going to [email or web address] or by calling [phone].

## 2024-11-18 NOTE — DISCHARGE NOTE NURSING/CASE MANAGEMENT/SOCIAL WORK - NSDCFUADDAPPT_GEN_ALL_CORE_FT
APPTS ARE READY TO BE MADE: [x ] YES    Best Family or Patient Contact (if needed):    Additional Information about above appointments (if needed):    1: Dr. Gillis- within one week- post hospital f/u   2: PCP Dr. Sibley - one week   3:     Other comments or requests:

## 2024-11-18 NOTE — CONSULT NOTE ADULT - SUBJECTIVE AND OBJECTIVE BOX
ENDOCRINE INITIAL CONSULT NOTE:    Patient is a 64y old  Male who presents with a chief complaint of painless jaundice (17 Nov 2024 14:13)      HPI:  64 Y M H/O HTN, HLD, DM, alcohol use disorder, presents for painless jaundice x 3 days. Also reports 1 episode of vomiting (white, NBNB) one week ago, as well as darker urine for 1 week. Pt has history of heavy drinking for past 6 months, 1 pint vodka per day, last drink on 10/29. Denies itching, abdominal pain, nausea, fever, chills, SOB, wheezing, chest pain, palpitations, body aches, dysuria, or any other complaints. Pt was recently admitted here on 11/2, left hospital AMA, and then returned here after recommendation from a doctor's phone call.    In ED:  - Vitals: , /69, SpO2 97% on RA, 99 F  - Labs: WBC 11, Hgb 9.8. PT 19, INR 1.68. Na 129, Cr 1.3. T Bili 18, D Bili 14, , , ALT 45, lipase 203  - Recent imaging:  CTAP: cirrhosis, hepatomegaly, portal HTN, varices, gall stones, cholecystic wall edema, pericholecystic fluid  Abd US: hepatomegaly, gall stones, distended gall bladder, negative sonographic Traylor, mild prominent CBD    Pt admitted for painless jaundice cirrhosis, alcohol use disorder. (04 Nov 2024 17:38)    64y Male    Diabetes History:  Diagnosis:  Home regimen:  Home fingersticks/ CGM:  Hypoglycemia events:  Retinopathy/most recent opthalmology:  Peripheral Neuropathy:  Nephropathy:  Cardiovascular disease:  Peripheral vascular disease:  Diet:  Recent A1C   PCP  Endocrinologist:    Review of systems:  Constitutional:  Constitutional: No fever, weight loss, fatigue, low energy, generalized weakness, poor appetite  Eyes: No redness, no dryness, no pain, no tearing, no gritty or amira feeling, no bulging  Cardiovascular/ Respiratory: No palpitations,, no chest pain, no shortness of breath, no exercise intolerance, no cough, no leg/ ankle swelling  Gastrointestinal: No trouble swallowing, no heart burn, no abdominal pain, no bloating, no nausea, no vomiting, no constipation, no diarrhea, no frequent bowel movements  Skin: No excessive hair growth, no hair loss, no acne, no excessive sweating, no rash, no easy bruising  Neurological: No headaches, no change in vision, no dizziness/ lightheadedness, no tremors, no numbness/ tingling in feet, no pain/ burning in feet, no trouble with balance, no muscular weakness.   Endocrine: Frequent urination, excessive urination, excessive thirst, symptoms     PAST MEDICAL & SURGICAL HISTORY:  No pertinent past medical history      HTN (hypertension)      HLD (hyperlipidemia)      Diabetes mellitus          FAMILY HISTORY:      Social History:  Occupation:  Marital status/Lives with  Exercise:  Tobacco:  Alcohol:  illicit drug abuse:  Health insurance status:    MEDICATIONS  (STANDING):  amLODIPine   Tablet 10 milliGRAM(s) Oral daily  enoxaparin Injectable 40 milliGRAM(s) SubCutaneous every 24 hours  folic acid 1 milliGRAM(s) Oral daily  influenza   Vaccine 0.5 milliLiter(s) IntraMuscular once  insulin glargine Injectable (LANTUS) 10 Unit(s) SubCutaneous at bedtime  insulin lispro (ADMELOG) corrective regimen sliding scale   SubCutaneous three times a day before meals  insulin lispro (ADMELOG) corrective regimen sliding scale   SubCutaneous at bedtime  insulin lispro Injectable (ADMELOG) 4 Unit(s) SubCutaneous three times a day before meals  multivitamin 1 Tablet(s) Oral daily  pantoprazole    Tablet 40 milliGRAM(s) Oral before breakfast  prednisoLONE    3 mG/mL Solution (ORAPRED) 30 milliGRAM(s) Oral daily  thiamine 100 milliGRAM(s) Oral daily    MEDICATIONS  (PRN):  aluminum hydroxide/magnesium hydroxide/simethicone Suspension 30 milliLiter(s) Oral every 6 hours PRN Dyspepsia      Physical Examination  Vital Signs Last 24 Hrs  T(C): 37.1 (18 Nov 2024 05:08), Max: 37.1 (17 Nov 2024 14:31)  T(F): 98.7 (18 Nov 2024 05:08), Max: 98.8 (17 Nov 2024 14:31)  HR: 93 (18 Nov 2024 05:08) (89 - 101)  BP: 125/68 (18 Nov 2024 05:08) (120/69 - 144/78)  BP(mean): 87 (18 Nov 2024 05:08) (87 - 100)  RR: 18 (18 Nov 2024 05:08) (17 - 18)  SpO2: 97% (18 Nov 2024 05:08) (97% - 97%)    Parameters below as of 18 Nov 2024 05:08  Patient On (Oxygen Delivery Method): room air      Constitutional: No acute distress, ill- appearing, no anxious appearing, hyperkinetic, no diaphoretic  HEENT: Moist mucous membranes  Neck:  No JVD, bruits or thyromegaly, No thyroid nodules palpable, no LAD  Respiratory:  Respiratory effort normal, lungs clear to ausculation, without rales or rhonchi  Cardiovascular:  Regular heart rate, normal S1 and S2 sounds, without murmur, rub or gallop.  Gastrointestinal: Soft, non tender without hepatosplenomegaly and masses, no abdominal obesity  Extremities: Sensation intact to monofilament in feet, no cyanosis, clubbing or edema, positive pedal pulses  Neurological:  Oriented to person, place and time, No gross sensory or motor defects, visual fields intact to confrontation, normal deep tendon reflexes    Labs:                        9.3    12.94 )-----------( 269      ( 18 Nov 2024 06:42 )             27.2     11-18    134[L]  |  103  |  28[H]  ----------------------------<  195[H]  4.8   |  25  |  1.16    Ca    9.3      18 Nov 2024 06:42    TPro  7.4  /  Alb  2.3[L]  /  TBili  10.6[H]  /  DBili  x   /  AST  171[H]  /  ALT  116[H]  /  AlkPhos  132[H]  11-18        PT/INR - ( 18 Nov 2024 06:42 )   PT: 17.5 sec;   INR: 1.50 ratio           Urinalysis Basic - ( 18 Nov 2024 06:42 )    Color: x / Appearance: x / SG: x / pH: x  Gluc: 195 mg/dL / Ketone: x  / Bili: x / Urobili: x   Blood: x / Protein: x / Nitrite: x   Leuk Esterase: x / RBC: x / WBC x   Sq Epi: x / Non Sq Epi: x / Bacteria: x      CAPILLARY BLOOD GLUCOSE      POCT Blood Glucose.: 357 mg/dL (18 Nov 2024 11:39)  POCT Blood Glucose.: 205 mg/dL (18 Nov 2024 07:47)  POCT Blood Glucose.: 279 mg/dL (17 Nov 2024 21:28)  POCT Blood Glucose.: 391 mg/dL (17 Nov 2024 16:31)      Radiology and diagnostic studies:      Assessment and Plan:  64y Male   Endocrinology is consulted fro    1) Type 2 diabetes:      Recommendations:  Basal Insulin:   Glargine ( Lantus) units once daily    Nutritional Insulin:   Lispro (Admelog) units with breakfast, Hold if NPO or eating <50% of meals  Lispro ( Admelog) units with lunch, Hold if NPO or eating < 50% of meals  Lispro (Admelog) units with dinner, Hold if NPO or eating < 50% of meals    Correctional Insulin:  Normal Lispro ( Admelog) correctional scale with meals and bedtime    Oral Diabetes Medications:  Non in the hospital     Endocrine initial consult note     64 year old  Male who presents with a chief complaint of painless jaundice (17 Nov 2024 14:13)    HPI:  64 year old Male with PMH of HTN, HLD, T2DM, alcohol use disorder, presents for painless jaundice x 3 days. Also reports 1 episode of vomiting (white, NBNB) one week ago, as well as darker urine for 1 week. Pt has history of heavy drinking for past 6 months, 1 pint vodka per day, last drink on 10/29. Denies itching, abdominal pain, nausea, fever, chills, SOB, wheezing, chest pain, palpitations, body aches, dysuria, or any other complaints. Pt was recently admitted here on 11/2, left hospital AMA, and then returned here after recommendation from a doctor's phone call. In ED:  Vitals: , /69, SpO2 97% on RA, 99 F,  Labs: WBC 11, Hgb 9.8. PT 19, INR 1.68. Na 129, Cr 1.3. T Bili 18, D Bili 14, , , ALT 45, lipase 203.  Recent imaging:  CTAP: cirrhosis, hepatomegaly, portal HTN, varices, gall stones, cholecystic wall edema, pericholecystic fluid  Abd US: hepatomegaly, gall stones, distended gall bladder, negative sonographic Traylor, mild prominent CBD. Pt admitted for painless jaundice cirrhosis, alcohol use disorder. (04 Nov 2024 17:38). Endocrinology is consulted for diabetes glycemic management    Patient seen at the bedside, providing diabetes hx.  Reports eating well    Diabetes history diagnosed with type 2 diabetes 5 years  Medication regimen: Was initially on metformin and Januvia and started Ozempic.  Currently on Mounjaro 7.5 mg weekly.  Home fingersticks: Reports all blood sugars are at goal  Hypoglycemic events: Denies  Retinopathy/Most recent ophthalmology: Sees ophthalmologist every 6 months, denies retinopathy  Peripheral neuropathy: Denies  Diet: Adheres to a low carbohydrate consistent diet  Recent A1c: Was 6%   PCP: Diabetes is managed by PCP, has never seen an endocrinologist    Review of systems:  Constitutional: No fever, weight loss, fatigue, low energy, generalized weakness, poor appetite  Cardiovascular/ Respiratory: No palpitations, no chest pain, no shortness of breath, no exercise intolerance, no cough, no leg/ ankle swelling  Gastrointestinal: No abdominal pain, no bloating, no nausea, no vomiting, no constipation, no diarrhea, no frequent bowel movements  Neurological: No headaches, no change in vision, no dizziness/ lightheadedness, no tremors, yes numbness/ tingling in feet, yes pain/ burning in feet, no trouble with balance, no muscular weakness.   Endocrine: No Frequent urination, excessive urination, excessive thirst, symptoms     Past Medical and Surgical Hx  HTN (hypertension)  HLD (hyperlipidemia)  Type 2 Diabetes mellitus    Family Hx:  Mother had type 2 diabetes    Social History:  Tobacco: Denies  Alcohol: Denies  illicit drug abuse: Denies    Medications (Standing):  amLODIPine   Tablet 10 milliGRAM(s) Oral daily  enoxaparin Injectable 40 milliGRAM(s) SubCutaneous every 24 hours  folic acid 1 milliGRAM(s) Oral daily  influenza   Vaccine 0.5 milliLiter(s) IntraMuscular once  insulin glargine Injectable (LANTUS) 10 Unit(s) SubCutaneous at bedtime  insulin lispro (ADMELOG) corrective regimen sliding scale   SubCutaneous three times a day before meals  insulin lispro (ADMELOG) corrective regimen sliding scale   SubCutaneous at bedtime  insulin lispro Injectable (ADMELOG) 4 Unit(s) SubCutaneous three times a day before meals  multivitamin 1 Tablet(s) Oral daily  pantoprazole    Tablet 40 milliGRAM(s) Oral before breakfast  prednisoLONE    3 mG/mL Solution (ORAPRED) 30 milliGRAM(s) Oral daily  thiamine 100 milliGRAM(s) Oral daily    Medications (PRN):  aluminum hydroxide/magnesium hydroxide/simethicone Suspension 30 milliLiter(s) Oral every 6 hours PRN Dyspepsia    Physical Examination  Vital Signs Last 24 Hrs  T(C): 37.1 (18 Nov 2024 05:08), Max: 37.1 (17 Nov 2024 14:31)  T(F): 98.7 (18 Nov 2024 05:08), Max: 98.8 (17 Nov 2024 14:31)  HR: 93 (18 Nov 2024 05:08) (89 - 101)  BP: 125/68 (18 Nov 2024 05:08) (120/69 - 144/78)  BP(mean): 87 (18 Nov 2024 05:08) (87 - 100)  RR: 18 (18 Nov 2024 05:08) (17 - 18)  SpO2: 97% (18 Nov 2024 05:08) (97% - 97%)    Parameters below as of 18 Nov 2024 05:08  Patient On (Oxygen Delivery Method): room air      Constitutional: No acute distress, not ill- appearing, icteric skin  HEENT: Moist mucous membranes  Neck:  No JVD, bruits or thyromegaly, No thyroid nodules palpable, no LAD  Gastrointestinal: Soft, non tender without hepatosplenomegaly and masses, no abdominal obesity  Extremities: Sensation intact in feet, no cyanosis, clubbing or edema, positive pedal pulses  Neurological:  Oriented to person, place and time      Labs:                        9.3    12.94 )-----------( 269      ( 18 Nov 2024 06:42 )             27.2     11-18  134[L]  |  103  |  28[H]  ----------------------------<  195[H]  4.8   |  25  |  1.16  Ca    9.3      18 Nov 2024 06:42  TPro  7.4  /  Alb  2.3[L]  /  TBili  10.6[H]  /  DBili  x   /  AST  171[H]  /  ALT  116[H]  /  AlkPhos  132[H]  11-18  PT/INR - ( 18 Nov 2024 06:42 )   PT: 17.5 sec;   INR: 1.50 ratio        Capillary Blood Glucose:  POCT Blood Glucose.: 357 mg/dL (18 Nov 2024 11:39)  POCT Blood Glucose.: 205 mg/dL (18 Nov 2024 07:47)  POCT Blood Glucose.: 279 mg/dL (17 Nov 2024 21:28)  POCT Blood Glucose.: 391 mg/dL (17 Nov 2024 16:31)    A1C with Estimated Average Glucose Result: 5.1 % (11.05.24 @ 06:50)      Assessment and Plan:   64 year old Male with PMH of HTN, HLD, T2DM, alcohol use disorder, presents for painless jaundice x 3 days. Admitted for alcoholic hepatitis started on high-dose steroids.  Endocrine is consulted for glycemic management    1) type 2 diabetes:  2) steroid-induced hyperglycemia    A1c at goal 5.1% on Mounjaro.  Discussed with patient that as he has a liver failure he needs to stop Mounjaro.  The safest regimen and liver failure is the subcutaneous basal and Premeal insulins   Patient reports eating well  Currently the BS are high due to high doses of steroids.     Inpatient recommendation:  Increase Lantus to 15 units at bedtime    Nutritional insulin:  Increase lispro to 10 units before meals    Correctional insulin:  Continue moderate lispro's correctional scale before meals and bedtime    Discharge recommendation:  Start Mounjaro  Start Lantus Solostar pen 15 units at bedtime  Start lispro 10 units before meals but if blood sugars are remaining greater than 200 he should take 12 units before meals and if blood sugars are greater than 300 then take 14 units before the meals.    Please provide insulin pen teaching.    Please ensure patient has all insulin supplies including glucometer, test strips, lancets, alcohol pads, insulin PEN, PEN needles on discharge      Diabetes education:  Extensive education about diabetes, hyperglycemia, hypoglycemia, glucose self-monitoring with glucometer, glucose target ranges, adherence to diabetes medications, diet, physical activity, importance of following up with outpatient endocrinology/ opthalmology and podiatry appointments discussed.   Explained the definition of HBA1C and target range.   Explained the complications of diabetes including stroke, renal failure and blindness  Reviewed hypoglycemic sign/symptoms and necessary precautions.   Discussed the goal fasting and postprandial BS at home  Patient verbalized understanding and agrees with the plan

## 2024-11-18 NOTE — CHART NOTE - NSCHARTNOTEFT_GEN_A_CORE
I called patient's PCP Dr. Chyna Sibley's office, spoke w/  who informed that doctor is busy seeing patients. Advised to send an email at [email or web address]

## 2024-11-18 NOTE — DISCHARGE NOTE NURSING/CASE MANAGEMENT/SOCIAL WORK - NSDCVIVACCINE_GEN_ALL_CORE_FT
Tdap; 08-Mar-2020 22:19; Marybel Haines (RN); [employer]; [id number] (Exp. Date: 19-Mar-2022); IntraMuscular; Deltoid Right.; 0.5 milliLiter(s); VIS (VIS Published: 09-May-2013, VIS Presented: 08-Mar-2020);

## 2025-02-06 NOTE — PROGRESS NOTE ADULT - ASSESSMENT
Fernando Green . (:  1965) is a 59 y.o. male,Established patient, here for evaluation of the following chief complaint(s):  Mass (Lump in left groin area, started out small/tiny 2days ago. Pt was feeling it/pushing on it in the shower and felt it pop. It went away. Pt then woke up the following day and it's huge, the size of a golf ball. Some pain noted and makes it difficult to walk. ) and Discuss Medications (Pt feels Mounjaro dose is too strong, has been feeling nauseated and \"just florencio, sick\")          Subjective   SUBJECTIVE/OBJECTIVE:  HPI  Chief Complaint   Patient presents with    Mass     Lump in left groin area, started out small/tiny 2days ago. Pt was feeling it/pushing on it in the shower and felt it pop. It went away. Pt then woke up the following day and it's huge, the size of a golf ball. Some pain noted and makes it difficult to walk.     Discuss Medications     Pt feels Mounjaro dose is too strong, has been feeling nauseated and \"just florencio, sick\"     Pt presents today to discuss painful lump to left groin area x 2 days.  States that he felt a \"pop\" in that region while taking a shower.    Some pressure in the testicle region.    Denies urinary symptoms.      Patient Active Problem List   Diagnosis    CAMI on CPAP    Morbid obesity    IFG (impaired fasting glucose)    Hyperlipidemia with target LDL less than 100    Renal cyst    Primary insomnia    Finding of above normal blood pressure       Current Outpatient Medications   Medication Sig Dispense Refill    rosuvastatin (CRESTOR) 20 MG tablet TAKE 1 TABLET BY MOUTH EVERYDAY AT BEDTIME 90 tablet 4    Tirzepatide (MOUNJARO) 10 MG/0.5ML SOAJ Inject 10 mg into the skin once a week 2 mL 1    lisinopril (PRINIVIL;ZESTRIL) 10 MG tablet Take 1 tablet by mouth daily 90 tablet 3     No current facility-administered medications for this visit.       Past Surgical History:   Procedure Laterality Date    CHOLECYSTECTOMY, LAPAROSCOPIC N/A 10/26/2020    LAP  1. Painless jaundice  2. Transaminitis  3. Alcoholic cirrhosis  4. Portal HTN  5. Paraesophageal/perigastric varices  6. Cholelithiasis with gallbladder wall edema and pericholecystic fluid  7. Cholecystitis less likely  8. Ascites  9. ETOH abuse  10. Anemia  11. No evidence of acute GI bleeding  12. S/p MRCP  13. No extrahepatic biliary obstruction    Suggestions:    1. Follow up LFT's  2. Continue antibiotics   3. Continue Steroid therapy as per hepatology  4. B-blocker therapy  5. Lactulose daily  6. Protonix 40mg daily  7. DT's precaution  8. Hepatology follow up  9. Monitor H/H  10. Transfuse PRBC as needed  11. EGD  12. Check AFP  13. Diagnostic and therapeutic paracentesis  14. Thiamine / Folate / MVI  15. DVT prophylaxis

## 2025-04-23 ENCOUNTER — APPOINTMENT (OUTPATIENT)
Dept: ENDOCRINOLOGY | Facility: CLINIC | Age: 65
End: 2025-04-23